# Patient Record
Sex: MALE | Race: WHITE | NOT HISPANIC OR LATINO | Employment: OTHER | URBAN - METROPOLITAN AREA
[De-identification: names, ages, dates, MRNs, and addresses within clinical notes are randomized per-mention and may not be internally consistent; named-entity substitution may affect disease eponyms.]

---

## 2017-01-24 ENCOUNTER — OFFICE VISIT (OUTPATIENT)
Dept: FAMILY MEDICINE CLINIC | Facility: CLINIC | Age: 62
End: 2017-01-24

## 2017-01-24 VITALS
RESPIRATION RATE: 16 BRPM | HEART RATE: 70 BPM | HEIGHT: 72 IN | SYSTOLIC BLOOD PRESSURE: 143 MMHG | BODY MASS INDEX: 33.72 KG/M2 | TEMPERATURE: 98.6 F | WEIGHT: 249 LBS | DIASTOLIC BLOOD PRESSURE: 73 MMHG

## 2017-01-24 DIAGNOSIS — I10 BENIGN ESSENTIAL HYPERTENSION: ICD-10-CM

## 2017-01-24 DIAGNOSIS — E78.00 HYPERCHOLESTEROLEMIA: ICD-10-CM

## 2017-01-24 DIAGNOSIS — Z79.4 TYPE 2 DIABETES MELLITUS WITH DIABETIC NEPHROPATHY, WITH LONG-TERM CURRENT USE OF INSULIN (HCC): Primary | ICD-10-CM

## 2017-01-24 DIAGNOSIS — E11.21 TYPE 2 DIABETES MELLITUS WITH DIABETIC NEPHROPATHY, WITHOUT LONG-TERM CURRENT USE OF INSULIN (HCC): ICD-10-CM

## 2017-01-24 DIAGNOSIS — E11.21 TYPE 2 DIABETES MELLITUS WITH DIABETIC NEPHROPATHY, WITH LONG-TERM CURRENT USE OF INSULIN (HCC): Primary | ICD-10-CM

## 2017-01-24 DIAGNOSIS — Z12.11 SCREENING FOR COLON CANCER: ICD-10-CM

## 2017-01-24 DIAGNOSIS — N18.30 CHRONIC KIDNEY DISEASE, STAGE III (MODERATE) (HCC): ICD-10-CM

## 2017-01-24 PROCEDURE — 99214 OFFICE O/P EST MOD 30 MIN: CPT | Performed by: FAMILY MEDICINE

## 2017-01-24 RX ORDER — LISINOPRIL 40 MG/1
40 TABLET ORAL DAILY
Qty: 90 TABLET | Refills: 1 | Status: SHIPPED | OUTPATIENT
Start: 2017-01-24 | End: 2017-08-22 | Stop reason: SDUPTHER

## 2017-01-24 RX ORDER — GLIMEPIRIDE 2 MG/1
TABLET ORAL
Qty: 90 TABLET | Refills: 1 | Status: SHIPPED | OUTPATIENT
Start: 2017-01-24 | End: 2017-08-22 | Stop reason: SDUPTHER

## 2017-01-24 RX ORDER — ATORVASTATIN CALCIUM 40 MG/1
40 TABLET, FILM COATED ORAL DAILY
Qty: 90 TABLET | Refills: 1 | Status: SHIPPED | OUTPATIENT
Start: 2017-01-24 | End: 2017-08-22 | Stop reason: SDUPTHER

## 2017-01-24 NOTE — PROGRESS NOTES
"Subjective   Junior Pollard Jr. is a 61 y.o. male.     History of Present Illness     Chief Complaint:   Chief Complaint   Patient presents with   • Diabetes   • Hyperlipidemia     DABETIC EYE EXAM DUE / COLONOSCOPY DUE    • Arthritis     PROSTATE EXAM THROUGH FIRE DEPARTMENT   • LAB RESULTS       Junior Pollard Jr. 61 y.o. male who presents today for Medical Management of the below listed issues and medication refills.  he has a history of   Patient Active Problem List   Diagnosis   • Abnormal cardiovascular stress test   • Multiple actinic keratoses   • Chronic coronary artery disease   • Benign essential hypertension   • Chronic kidney disease, stage III (moderate)   • Diabetes mellitus with renal complications   • Hypercholesterolemia   • Elevated creatine kinase level   • Onychomycosis of toenail   • Osteoarthritis   • Proteinuria   • Type 2 diabetes with nephropathy   .  Since the last visit, he has overall felt well.  he has been compliant with   Current Outpatient Prescriptions:   •  atorvastatin (LIPITOR) 40 MG tablet, Take 1 tablet by mouth Daily., Disp: 90 tablet, Rfl: 1  •  glimepiride (AMARYL) 2 MG tablet, Take 1/2 tab BID, Disp: 90 tablet, Rfl: 1  •  glucose blood (ACCU-CHEK COMPACT PLUS) test strip, 1 each by Other route Daily. Use as instructed, Disp: 100 each, Rfl: 3  •  Liraglutide (VICTOZA) 18 MG/3ML solution pen-injector, Inject 1.8 mg under the skin Daily., Disp: 9 pen, Rfl: 1  •  lisinopril (PRINIVIL,ZESTRIL) 40 MG tablet, Take 1 tablet by mouth Daily., Disp: 90 tablet, Rfl: 1.  he denies medication side effects.    Pt is not using the Insulin, just the Victoza, and is improving.    All of the chronic condition(s) listed above are stable w/o issues.    Visit Vitals   • /73   • Pulse 70   • Temp 98.6 °F (37 °C) (Oral)   • Resp 16   • Ht 72\" (182.9 cm)   • Wt 249 lb (113 kg)   • BMI 33.77 kg/m2       Results for orders placed or performed in visit on 10/19/16   Basic Metabolic Panel "   Result Value Ref Range    Glucose 169 (H) 65 - 99 mg/dL    BUN 19 8 - 23 mg/dL    Creatinine 1.41 (H) 0.76 - 1.27 mg/dL    eGFR Non African Am 51 (L) >60 mL/min/1.73    eGFR African Am 62 >60 mL/min/1.73    BUN/Creatinine Ratio 13.5 7.0 - 25.0    Sodium 142 136 - 145 mmol/L    Potassium 4.9 3.5 - 5.2 mmol/L    Chloride 102 98 - 107 mmol/L    Total CO2 25.5 22.0 - 29.0 mmol/L    Calcium 9.7 8.6 - 10.5 mg/dL   Hemoglobin A1c   Result Value Ref Range    Hemoglobin A1C 7.66 (H) 4.80 - 5.60 %         The following portions of the patient's history were reviewed and updated as appropriate: allergies, current medications, past family history, past medical history, past social history, past surgical history and problem list.    Review of Systems   Constitutional: Negative for activity change, chills, fatigue and fever.   Respiratory: Negative for cough and chest tightness.    Cardiovascular: Negative for chest pain and palpitations.   Gastrointestinal: Negative for abdominal pain and nausea.   Endocrine: Negative for cold intolerance and polydipsia.   Psychiatric/Behavioral: Negative for behavioral problems and dysphoric mood.   All other systems reviewed and are negative.      Objective   Physical Exam   Constitutional: He appears well-developed and well-nourished.   Neck: Neck supple. No thyromegaly present.   Cardiovascular: Normal rate and regular rhythm.    No murmur heard.  Pulmonary/Chest: Effort normal and breath sounds normal.   Abdominal: Bowel sounds are normal.   Psychiatric: He has a normal mood and affect. His behavior is normal.   Nursing note and vitals reviewed.    Labs reviewed with pt today during visit. All questions answered.    Assessment/Plan   Junior was seen today for diabetes, hyperlipidemia, arthritis and lab results.    Diagnoses and all orders for this visit:    Type 2 diabetes mellitus with diabetic nephropathy, with long-term current use of insulin  -     Ambulatory Referral to  Ophthalmology    Benign essential hypertension  -     lisinopril (PRINIVIL,ZESTRIL) 40 MG tablet; Take 1 tablet by mouth Daily.    Chronic kidney disease, stage III (moderate)    Hypercholesterolemia  -     atorvastatin (LIPITOR) 40 MG tablet; Take 1 tablet by mouth Daily.    Screening for colon cancer  -     Ambulatory Referral to Gastroenterology    Type 2 diabetes mellitus with diabetic nephropathy, without long-term current use of insulin  -     glimepiride (AMARYL) 2 MG tablet; Take 1/2 tab BID  -     Liraglutide (VICTOZA) 18 MG/3ML solution pen-injector; Inject 1.8 mg under the skin Daily.

## 2017-01-24 NOTE — MR AVS SNAPSHOT
Junior FERNANDO ToledoLatrice Jr.   1/24/2017 8:00 AM   Office Visit    Provider:  Boogie Interiano MD   Department:  [facility]   Dept Phone:  [phone]                Your Full Care Plan              Today's Medication Changes          These changes are accurate as of: 1/24/17  8:24 AM.  If you have any questions, ask your nurse or doctor.               Stop taking medication(s)listed here:     Insulin Degludec 100 UNIT/ML solution pen-injector   Stopped by:  Boogie Interiano MD                Where to Get Your Medications      These medications were sent to [address] [facility] - [phone]  - [phone] [facility] [facility], [place] [address]     Phone:  [phone]     atorvastatin 40 MG tablet    glimepiride 2 MG tablet    Liraglutide 18 MG/3ML solution pen-injector    lisinopril 40 MG tablet                  Your Updated Medication List          This list is accurate as of: 1/24/17  8:24 AM.  Always use your most recent med list.                atorvastatin 40 MG tablet   Commonly known as:  LIPITOR   Take 1 tablet by mouth Daily.       glimepiride 2 MG tablet   Commonly known as:  AMARYL   Take 1/2 tab BID       glucose blood test strip   Commonly known as:  ACCU-CHEK COMPACT PLUS   1 each by Other route Daily. Use as instructed       Liraglutide 18 MG/3ML solution pen-injector   Commonly known as:  VICTOZA   Inject 1.8 mg under the skin Daily.       lisinopril 40 MG tablet   Commonly known as:  PRINIVIL,ZESTRIL   Take 1 tablet by mouth Daily.               We Performed the Following     Ambulatory Referral to Gastroenterology     Ambulatory Referral to Ophthalmology       You Were Diagnosed With        Codes Comments    Type 2 diabetes mellitus with diabetic nephropathy, with long-term current use of insulin    -  Primary ICD-10-CM: E11.21, Z79.4  ICD-9-CM: 250.40, 583.81,  "V58.67     Benign essential hypertension     ICD-10-CM: I10  ICD-9-CM: 401.1     Chronic kidney disease, stage III (moderate)     ICD-10-CM: N18.3  ICD-9-CM: 585.3     Hypercholesterolemia     ICD-10-CM: E78.00  ICD-9-CM: 272.0     Screening for colon cancer     ICD-10-CM: Z12.11  ICD-9-CM: V76.51     Type 2 diabetes mellitus with diabetic nephropathy, without long-term current use of insulin     ICD-10-CM: E11.21  ICD-9-CM: 250.40, 583.81       Instructions     None    Patient Instructions History      [employer] Signup     Our records indicate that you have an active [employer] account.    You can view your After Visit Summary by going to [employer] and logging in with your [employer] username and password.  If you don't have a [employer] username and password but a parent or guardian has access to your record, the parent or guardian should login with their own [employer] username and password and access your record to view the After Visit Summary.    If you have questions, you can email [email or web address] or call [phone] to talk to our [employer] staff.  Remember, [employer] is NOT to be used for urgent needs.  For medical emergencies, dial 911.               Other Info from Your Visit           Allergies     No Known Allergies      Reason for Visit     Diabetes     Hyperlipidemia DABETIC EYE EXAM DUE / COLONOSCOPY DUE     Arthritis PROSTATE EXAM THROUGH FIRE DEPARTMENT    LAB RESULTS           Vital Signs     Blood Pressure Pulse Temperature Respirations Height Weight    143/73 70 98.6 °F (37 °C) (Oral) 16 72\" (182.9 cm) 249 lb (113 kg)    Body Mass Index Smoking Status                33.77 kg/m2 Never Smoker          Problems and Diagnoses Noted     Benign essential hypertension    Chronic kidney disease, stage III (moderate)    Diabetes with kidney complications    High cholesterol    Screen for colon cancer          "

## 2017-08-16 ENCOUNTER — TELEPHONE (OUTPATIENT)
Dept: FAMILY MEDICINE CLINIC | Facility: CLINIC | Age: 62
End: 2017-08-16

## 2017-08-16 DIAGNOSIS — E11.21 TYPE 2 DIABETES MELLITUS WITH DIABETIC NEPHROPATHY, WITH LONG-TERM CURRENT USE OF INSULIN (HCC): ICD-10-CM

## 2017-08-16 DIAGNOSIS — I10 BENIGN ESSENTIAL HYPERTENSION: Primary | ICD-10-CM

## 2017-08-16 DIAGNOSIS — E78.00 HYPERCHOLESTEROLEMIA: ICD-10-CM

## 2017-08-16 DIAGNOSIS — Z79.4 TYPE 2 DIABETES MELLITUS WITH DIABETIC NEPHROPATHY, WITH LONG-TERM CURRENT USE OF INSULIN (HCC): ICD-10-CM

## 2017-08-16 NOTE — TELEPHONE ENCOUNTER
----- Message from Junior Pollard Jr. sent at 8/15/2017  7:29 PM EDT -----  Regarding: Test Results Question  Contact: [phone]  A1C1 test befor vist on the 22 of Aug. can I get the blood work before the test, and do you all have to send any paper work over to [facility]

## 2017-08-18 LAB
ALBUMIN SERPL-MCNC: 4.4 G/DL (ref 3.5–5.2)
ALBUMIN/GLOB SERPL: 1.4 G/DL
ALP SERPL-CCNC: 77 U/L (ref 39–117)
ALT SERPL-CCNC: 15 U/L (ref 1–41)
AST SERPL-CCNC: 10 U/L (ref 1–40)
BASOPHILS # BLD AUTO: 0.08 10*3/MM3 (ref 0–0.2)
BASOPHILS NFR BLD AUTO: 1 % (ref 0–1.5)
BILIRUB SERPL-MCNC: 0.5 MG/DL (ref 0.1–1.2)
BUN SERPL-MCNC: 20 MG/DL (ref 8–23)
BUN/CREAT SERPL: 13.9 (ref 7–25)
CALCIUM SERPL-MCNC: 9.9 MG/DL (ref 8.6–10.5)
CHLORIDE SERPL-SCNC: 101 MMOL/L (ref 98–107)
CHOLEST SERPL-MCNC: 132 MG/DL (ref 0–200)
CO2 SERPL-SCNC: 25.8 MMOL/L (ref 22–29)
CREAT SERPL-MCNC: 1.44 MG/DL (ref 0.76–1.27)
EOSINOPHIL # BLD AUTO: 0.15 10*3/MM3 (ref 0–0.7)
EOSINOPHIL NFR BLD AUTO: 1.9 % (ref 0.3–6.2)
ERYTHROCYTE [DISTWIDTH] IN BLOOD BY AUTOMATED COUNT: 12.9 % (ref 11.5–14.5)
GLOBULIN SER CALC-MCNC: 3.1 GM/DL
GLUCOSE SERPL-MCNC: 179 MG/DL (ref 65–99)
HBA1C MFR BLD: 8.6 % (ref 4.8–5.6)
HCT VFR BLD AUTO: 42.4 % (ref 40.4–52.2)
HDLC SERPL-MCNC: 27 MG/DL (ref 40–60)
HGB BLD-MCNC: 13.8 G/DL (ref 13.7–17.6)
IMM GRANULOCYTES # BLD: 0.02 10*3/MM3 (ref 0–0.03)
IMM GRANULOCYTES NFR BLD: 0.2 % (ref 0–0.5)
LDLC SERPL CALC-MCNC: 77 MG/DL (ref 0–100)
LDLC/HDLC SERPL: 2.86 {RATIO}
LYMPHOCYTES # BLD AUTO: 2.53 10*3/MM3 (ref 0.9–4.8)
LYMPHOCYTES NFR BLD AUTO: 31.4 % (ref 19.6–45.3)
MCH RBC QN AUTO: 30.9 PG (ref 27–32.7)
MCHC RBC AUTO-ENTMCNC: 32.5 G/DL (ref 32.6–36.4)
MCV RBC AUTO: 94.9 FL (ref 79.8–96.2)
MICROALBUMIN UR-MCNC: 36.1 UG/ML
MONOCYTES # BLD AUTO: 0.47 10*3/MM3 (ref 0.2–1.2)
MONOCYTES NFR BLD AUTO: 5.8 % (ref 5–12)
NEUTROPHILS # BLD AUTO: 4.82 10*3/MM3 (ref 1.9–8.1)
NEUTROPHILS NFR BLD AUTO: 59.7 % (ref 42.7–76)
PLATELET # BLD AUTO: 280 10*3/MM3 (ref 140–500)
POTASSIUM SERPL-SCNC: 4.9 MMOL/L (ref 3.5–5.2)
PROT SERPL-MCNC: 7.5 G/DL (ref 6–8.5)
RBC # BLD AUTO: 4.47 10*6/MM3 (ref 4.6–6)
SODIUM SERPL-SCNC: 139 MMOL/L (ref 136–145)
TRIGL SERPL-MCNC: 139 MG/DL (ref 0–150)
TSH SERPL DL<=0.005 MIU/L-ACNC: 1.7 MIU/ML (ref 0.27–4.2)
VLDLC SERPL CALC-MCNC: 27.8 MG/DL (ref 5–40)
WBC # BLD AUTO: 8.07 10*3/MM3 (ref 4.5–10.7)

## 2017-08-22 ENCOUNTER — OFFICE VISIT (OUTPATIENT)
Dept: FAMILY MEDICINE CLINIC | Facility: CLINIC | Age: 62
End: 2017-08-22

## 2017-08-22 VITALS
DIASTOLIC BLOOD PRESSURE: 72 MMHG | RESPIRATION RATE: 16 BRPM | HEIGHT: 72 IN | SYSTOLIC BLOOD PRESSURE: 138 MMHG | TEMPERATURE: 98.9 F | BODY MASS INDEX: 33.86 KG/M2 | WEIGHT: 250 LBS | HEART RATE: 80 BPM

## 2017-08-22 DIAGNOSIS — E11.21 TYPE 2 DIABETES MELLITUS WITH DIABETIC NEPHROPATHY, WITHOUT LONG-TERM CURRENT USE OF INSULIN (HCC): ICD-10-CM

## 2017-08-22 DIAGNOSIS — I10 BENIGN ESSENTIAL HYPERTENSION: ICD-10-CM

## 2017-08-22 DIAGNOSIS — E78.00 HYPERCHOLESTEROLEMIA: ICD-10-CM

## 2017-08-22 DIAGNOSIS — Z12.5 SCREENING FOR PROSTATE CANCER: Primary | ICD-10-CM

## 2017-08-22 PROCEDURE — 99214 OFFICE O/P EST MOD 30 MIN: CPT | Performed by: FAMILY MEDICINE

## 2017-08-22 RX ORDER — LISINOPRIL 40 MG/1
40 TABLET ORAL DAILY
Qty: 90 TABLET | Refills: 1 | Status: SHIPPED | OUTPATIENT
Start: 2017-08-22 | End: 2018-02-01 | Stop reason: SDUPTHER

## 2017-08-22 RX ORDER — ATORVASTATIN CALCIUM 40 MG/1
40 TABLET, FILM COATED ORAL DAILY
Qty: 90 TABLET | Refills: 1 | Status: SHIPPED | OUTPATIENT
Start: 2017-08-22 | End: 2018-02-01 | Stop reason: SDUPTHER

## 2017-08-22 RX ORDER — GLIMEPIRIDE 2 MG/1
2 TABLET ORAL 2 TIMES DAILY
Qty: 180 TABLET | Refills: 1 | Status: SHIPPED | OUTPATIENT
Start: 2017-08-22 | End: 2018-02-01 | Stop reason: SDUPTHER

## 2017-08-22 NOTE — PROGRESS NOTES
"Subjective   Junior Pollard Jr. is a 61 y.o. male.     History of Present Illness     Chief Complaint:   Chief Complaint   Patient presents with   • Diabetes     MED REFILL    • Hyperlipidemia   • LAB RESULTS   • Hypertension       Junior Pollard Jr. 61 y.o. male who presents today for Medical Management of the below listed issues and medication refills.  he has a problem list of   Patient Active Problem List   Diagnosis   • Abnormal cardiovascular stress test   • Multiple actinic keratoses   • Chronic coronary artery disease   • Benign essential hypertension   • Chronic kidney disease, stage III (moderate)   • Diabetes mellitus with renal complications   • Hypercholesterolemia   • Elevated creatine kinase level   • Onychomycosis of toenail   • Osteoarthritis   • Proteinuria   • Type 2 diabetes with nephropathy   .  Since the last visit, he has overall felt well.  he has been compliant with   Current Outpatient Prescriptions:   •  atorvastatin (LIPITOR) 40 MG tablet, Take 1 tablet by mouth Daily., Disp: 90 tablet, Rfl: 1  •  glimepiride (AMARYL) 2 MG tablet, Take 1 tablet by mouth 2 (Two) Times a Day., Disp: 180 tablet, Rfl: 1  •  glucose blood (ACCU-CHEK COMPACT PLUS) test strip, 1 each by Other route Daily. Use as instructed, Disp: 100 each, Rfl: 3  •  Liraglutide (VICTOZA) 18 MG/3ML solution pen-injector, Inject 1.8 mg under the skin Daily., Disp: 9 pen, Rfl: 1  •  lisinopril (PRINIVIL,ZESTRIL) 40 MG tablet, Take 1 tablet by mouth Daily., Disp: 90 tablet, Rfl: 1.  he denies medication side effects.    All of the chronic condition(s) listed above are stable w/o issues.    /72  Pulse 80  Temp 98.9 °F (37.2 °C) (Oral)   Resp 16  Ht 72\" (182.9 cm)  Wt 250 lb (113 kg)  BMI 33.91 kg/m2    Results for orders placed or performed in visit on 08/16/17   Lipid Panel With LDL/HDL Ratio   Result Value Ref Range    Total Cholesterol 132 0 - 200 mg/dL    Triglycerides 139 0 - 150 mg/dL    HDL Cholesterol 27 " (L) 40 - 60 mg/dL    VLDL Cholesterol 27.8 5 - 40 mg/dL    LDL Cholesterol  77 0 - 100 mg/dL    LDL/HDL Ratio 2.86    TSH   Result Value Ref Range    TSH 1.700 0.270 - 4.200 mIU/mL   Hemoglobin A1c   Result Value Ref Range    Hemoglobin A1C 8.60 (H) 4.80 - 5.60 %   MicroAlbumin, Urine, Random   Result Value Ref Range    Microalbumin, Urine 36.1 Not Estab. ug/mL   Comprehensive metabolic panel   Result Value Ref Range    Glucose 179 (H) 65 - 99 mg/dL    BUN 20 8 - 23 mg/dL    Creatinine 1.44 (H) 0.76 - 1.27 mg/dL    eGFR Non African Am 50 (L) >60 mL/min/1.73    eGFR African Am 60 (L) >60 mL/min/1.73    BUN/Creatinine Ratio 13.9 7.0 - 25.0    Sodium 139 136 - 145 mmol/L    Potassium 4.9 3.5 - 5.2 mmol/L    Chloride 101 98 - 107 mmol/L    Total CO2 25.8 22.0 - 29.0 mmol/L    Calcium 9.9 8.6 - 10.5 mg/dL    Total Protein 7.5 6.0 - 8.5 g/dL    Albumin 4.40 3.50 - 5.20 g/dL    Globulin 3.1 gm/dL    A/G Ratio 1.4 g/dL    Total Bilirubin 0.5 0.1 - 1.2 mg/dL    Alkaline Phosphatase 77 39 - 117 U/L    AST (SGOT) 10 1 - 40 U/L    ALT (SGPT) 15 1 - 41 U/L   CBC and Differential   Result Value Ref Range    WBC 8.07 4.50 - 10.70 10*3/mm3    RBC 4.47 (L) 4.60 - 6.00 10*6/mm3    Hemoglobin 13.8 13.7 - 17.6 g/dL    Hematocrit 42.4 40.4 - 52.2 %    MCV 94.9 79.8 - 96.2 fL    MCH 30.9 27.0 - 32.7 pg    MCHC 32.5 (L) 32.6 - 36.4 g/dL    RDW 12.9 11.5 - 14.5 %    Platelets 280 140 - 500 10*3/mm3    Neutrophil Rel % 59.7 42.7 - 76.0 %    Lymphocyte Rel % 31.4 19.6 - 45.3 %    Monocyte Rel % 5.8 5.0 - 12.0 %    Eosinophil Rel % 1.9 0.3 - 6.2 %    Basophil Rel % 1.0 0.0 - 1.5 %    Neutrophils Absolute 4.82 1.90 - 8.10 10*3/mm3    Lymphocytes Absolute 2.53 0.90 - 4.80 10*3/mm3    Monocytes Absolute 0.47 0.20 - 1.20 10*3/mm3    Eosinophils Absolute 0.15 0.00 - 0.70 10*3/mm3    Basophils Absolute 0.08 0.00 - 0.20 10*3/mm3    Immature Granulocyte Rel % 0.2 0.0 - 0.5 %    Immature Grans Absolute 0.02 0.00 - 0.03 10*3/mm3         The following  portions of the patient's history were reviewed and updated as appropriate: allergies, current medications, past family history, past medical history, past social history, past surgical history and problem list.    Review of Systems   Constitutional: Negative for activity change, chills, fatigue and fever.   Respiratory: Negative for cough and shortness of breath.    Cardiovascular: Negative for chest pain and palpitations.   Gastrointestinal: Negative for abdominal pain.   Endocrine: Negative for cold intolerance.   Psychiatric/Behavioral: Negative for behavioral problems and dysphoric mood. The patient is not nervous/anxious.        Objective   Physical Exam   Constitutional: He appears well-developed and well-nourished.   Neck: Neck supple. No thyromegaly present.   Cardiovascular: Normal rate and regular rhythm.    No murmur heard.  Pulmonary/Chest: Effort normal and breath sounds normal.   Abdominal: Bowel sounds are normal.    Junior had a diabetic foot exam performed today.   During the foot exam he had a monofilament test performed.    Neurological Sensory Findings - Unaltered hot/cold right ankle/foot discrimination and unaltered hot/cold left ankle/foot discrimination. Unaltered sharp/dull right ankle/foot discrimination and unaltered sharp/dull left ankle/foot discrimination. No right ankle/foot altered proprioception and no left ankle/foot altered proprioception    Vascular Status -  His exam exhibits right foot vasculature normal. His exam exhibits no right foot edema. His exam exhibits left foot vasculature normal. His exam exhibits no left foot edema.   Skin Integrity  -  His right foot skin is intact.     Junior 's left foot skin is intact. .  Psychiatric: He has a normal mood and affect. His behavior is normal.   Nursing note and vitals reviewed.  Labs reviewed with pt today during visit. All questions answered.      Assessment/Plan   Junior was seen today for diabetes, hyperlipidemia, lab results  and hypertension.    Diagnoses and all orders for this visit:    Screening for prostate cancer  -     PSA; Future    Type 2 diabetes mellitus with diabetic nephropathy, without long-term current use of insulin  Comments:  uncontrolled  Orders:  -     Liraglutide (VICTOZA) 18 MG/3ML solution pen-injector; Inject 1.8 mg under the skin Daily.  -     glimepiride (AMARYL) 2 MG tablet; Take 1 tablet by mouth 2 (Two) Times a Day.  -     glucose blood (ACCU-CHEK COMPACT PLUS) test strip; 1 each by Other route Daily. Use as instructed  -     Hemoglobin A1c; Future  -     Basic Metabolic Panel; Future    Benign essential hypertension  -     lisinopril (PRINIVIL,ZESTRIL) 40 MG tablet; Take 1 tablet by mouth Daily.  -     Basic Metabolic Panel; Future    Hypercholesterolemia  -     atorvastatin (LIPITOR) 40 MG tablet; Take 1 tablet by mouth Daily.    Pt is going to see his eye MD shortly.

## 2018-01-22 ENCOUNTER — RESULTS ENCOUNTER (OUTPATIENT)
Dept: FAMILY MEDICINE CLINIC | Facility: CLINIC | Age: 63
End: 2018-01-22

## 2018-01-22 DIAGNOSIS — I10 BENIGN ESSENTIAL HYPERTENSION: ICD-10-CM

## 2018-01-22 DIAGNOSIS — E11.21 TYPE 2 DIABETES MELLITUS WITH DIABETIC NEPHROPATHY, WITHOUT LONG-TERM CURRENT USE OF INSULIN (HCC): ICD-10-CM

## 2018-01-22 DIAGNOSIS — Z12.5 SCREENING FOR PROSTATE CANCER: ICD-10-CM

## 2018-01-22 LAB
BUN SERPL-MCNC: 20 MG/DL (ref 8–23)
BUN/CREAT SERPL: 14.5 (ref 7–25)
CALCIUM SERPL-MCNC: 9.5 MG/DL (ref 8.6–10.5)
CHLORIDE SERPL-SCNC: 103 MMOL/L (ref 98–107)
CO2 SERPL-SCNC: 24.2 MMOL/L (ref 22–29)
CREAT SERPL-MCNC: 1.38 MG/DL (ref 0.76–1.27)
GLUCOSE SERPL-MCNC: 237 MG/DL (ref 65–99)
HBA1C MFR BLD: 8.64 % (ref 4.8–5.6)
POTASSIUM SERPL-SCNC: 5 MMOL/L (ref 3.5–5.2)
PSA SERPL-MCNC: 0.51 NG/ML (ref 0–4)
SODIUM SERPL-SCNC: 140 MMOL/L (ref 136–145)

## 2018-02-01 ENCOUNTER — OFFICE VISIT (OUTPATIENT)
Dept: FAMILY MEDICINE CLINIC | Facility: CLINIC | Age: 63
End: 2018-02-01

## 2018-02-01 VITALS
SYSTOLIC BLOOD PRESSURE: 128 MMHG | HEIGHT: 72 IN | BODY MASS INDEX: 33.59 KG/M2 | HEART RATE: 78 BPM | WEIGHT: 248 LBS | RESPIRATION RATE: 20 BRPM | DIASTOLIC BLOOD PRESSURE: 72 MMHG | TEMPERATURE: 98 F

## 2018-02-01 DIAGNOSIS — E78.00 HYPERCHOLESTEROLEMIA: ICD-10-CM

## 2018-02-01 DIAGNOSIS — E11.21 TYPE 2 DIABETES WITH NEPHROPATHY (HCC): Primary | ICD-10-CM

## 2018-02-01 DIAGNOSIS — I10 BENIGN ESSENTIAL HYPERTENSION: ICD-10-CM

## 2018-02-01 PROCEDURE — 99214 OFFICE O/P EST MOD 30 MIN: CPT | Performed by: FAMILY MEDICINE

## 2018-02-01 RX ORDER — PIOGLITAZONEHYDROCHLORIDE 30 MG/1
30 TABLET ORAL DAILY
Qty: 90 TABLET | Refills: 1 | Status: SHIPPED | OUTPATIENT
Start: 2018-02-01 | End: 2018-07-16 | Stop reason: SDUPTHER

## 2018-02-01 RX ORDER — GLIMEPIRIDE 2 MG/1
2 TABLET ORAL
Qty: 180 TABLET | Refills: 1 | Status: SHIPPED | OUTPATIENT
Start: 2018-02-01 | End: 2018-09-13 | Stop reason: SDUPTHER

## 2018-02-01 RX ORDER — ATORVASTATIN CALCIUM 40 MG/1
40 TABLET, FILM COATED ORAL DAILY
Qty: 90 TABLET | Refills: 1 | Status: SHIPPED | OUTPATIENT
Start: 2018-02-01 | End: 2018-09-13 | Stop reason: SDUPTHER

## 2018-02-01 RX ORDER — LISINOPRIL 40 MG/1
40 TABLET ORAL DAILY
Qty: 90 TABLET | Refills: 1 | Status: SHIPPED | OUTPATIENT
Start: 2018-02-01 | End: 2018-09-13 | Stop reason: SDUPTHER

## 2018-02-01 NOTE — PROGRESS NOTES
"Subjective   Junior Pollard Jr. is a 62 y.o. male.     History of Present Illness     Chief Complaint:   Chief Complaint   Patient presents with   • Diabetes   • Hypertension   • Hyperlipidemia       Junior Pollard Jr. 62 y.o. male who presents today for Medical Management of the below listed issues and medication refills.  he has a problem list of   Patient Active Problem List   Diagnosis   • Abnormal cardiovascular stress test   • Multiple actinic keratoses   • Chronic coronary artery disease   • Benign essential hypertension   • Chronic kidney disease, stage III (moderate)   • Diabetes mellitus with renal complications   • Hypercholesterolemia   • Elevated creatine kinase level   • Onychomycosis of toenail   • Osteoarthritis   • Proteinuria   • Type 2 diabetes with nephropathy   .  Since the last visit, he has overall felt well.  he has been compliant with   Current Outpatient Prescriptions:   •  atorvastatin (LIPITOR) 40 MG tablet, Take 1 tablet by mouth Daily., Disp: 90 tablet, Rfl: 1  •  glimepiride (AMARYL) 2 MG tablet, Take 1 tablet by mouth 2 (Two) Times a Day., Disp: 180 tablet, Rfl: 1  •  glucose blood (ACCU-CHEK COMPACT PLUS) test strip, 1 each by Other route Daily. Use as instructed, Disp: 100 each, Rfl: 3  •  Liraglutide (VICTOZA) 18 MG/3ML solution pen-injector injection, Inject 1.8 mg under the skin Daily., Disp: 9 pen, Rfl: 1  •  lisinopril (PRINIVIL,ZESTRIL) 40 MG tablet, Take 1 tablet by mouth Daily., Disp: 90 tablet, Rfl: 1  •  pioglitazone (ACTOS) 30 MG tablet, Take 1 tablet by mouth Daily., Disp: 90 tablet, Rfl: 1.  he denies medication side effects.    All of the chronic condition(s) listed above are stable w/o issues.    /72  Pulse 78  Temp 98 °F (36.7 °C)  Resp 20  Ht 182.9 cm (72.01\")  Wt 112 kg (248 lb)  BMI 33.63 kg/m2    Results for orders placed or performed in visit on 01/22/18   Hemoglobin A1c   Result Value Ref Range    Hemoglobin A1C 8.64 (H) 4.80 - 5.60 %   Basic " Metabolic Panel   Result Value Ref Range    Glucose 237 (H) 65 - 99 mg/dL    BUN 20 8 - 23 mg/dL    Creatinine 1.38 (H) 0.76 - 1.27 mg/dL    eGFR Non African Am 52 (L) >60 mL/min/1.73    eGFR African Am 63 >60 mL/min/1.73    BUN/Creatinine Ratio 14.5 7.0 - 25.0    Sodium 140 136 - 145 mmol/L    Potassium 5.0 3.5 - 5.2 mmol/L    Chloride 103 98 - 107 mmol/L    Total CO2 24.2 22.0 - 29.0 mmol/L    Calcium 9.5 8.6 - 10.5 mg/dL   PSA   Result Value Ref Range    PSA 0.508 0.000 - 4.000 ng/mL           The following portions of the patient's history were reviewed and updated as appropriate: allergies, current medications, past family history, past medical history, past social history, past surgical history and problem list.    Review of Systems   Constitutional: Negative for activity change, chills, fatigue and fever.   Respiratory: Negative for cough and shortness of breath.    Cardiovascular: Negative for chest pain and palpitations.   Gastrointestinal: Negative for abdominal pain.   Endocrine: Negative for cold intolerance, polydipsia, polyphagia and polyuria.   Skin: Negative for pallor.   Neurological: Negative for dizziness, tremors, seizures, speech difficulty, weakness and headaches.   Psychiatric/Behavioral: Negative for behavioral problems, confusion and dysphoric mood. The patient is not nervous/anxious.        Objective   Physical Exam   Constitutional: He appears well-developed and well-nourished.   Neck: Neck supple. No thyromegaly present.   Cardiovascular: Normal rate and regular rhythm.    No murmur heard.  Pulmonary/Chest: Effort normal and breath sounds normal.   Abdominal: Bowel sounds are normal.   Psychiatric: He has a normal mood and affect. His behavior is normal.   Nursing note and vitals reviewed.      Assessment/Plan   Junior was seen today for diabetes, hypertension and hyperlipidemia.    Diagnoses and all orders for this visit:    Type 2 diabetes with  nephropathy  Comments:  uncontrolled  Orders:  -     glimepiride (AMARYL) 2 MG tablet; Take 1 tablet by mouth 2 (Two) Times a Day.  -     Liraglutide (VICTOZA) 18 MG/3ML solution pen-injector injection; Inject 1.8 mg under the skin Daily.  -     pioglitazone (ACTOS) 30 MG tablet; Take 1 tablet by mouth Daily.  -     Hemoglobin A1c; Future  -     Comprehensive Metabolic Panel; Future  -     Lipid Panel; Future    Benign essential hypertension  -     lisinopril (PRINIVIL,ZESTRIL) 40 MG tablet; Take 1 tablet by mouth Daily.  -     Comprehensive Metabolic Panel; Future  -     CBC & Differential; Future  -     Lipid Panel; Future    Hypercholesterolemia  -     atorvastatin (LIPITOR) 40 MG tablet; Take 1 tablet by mouth Daily.  -     Lipid Panel; Future

## 2018-05-16 ENCOUNTER — CLINICAL SUPPORT (OUTPATIENT)
Dept: FAMILY MEDICINE CLINIC | Facility: CLINIC | Age: 63
End: 2018-05-16

## 2018-05-16 DIAGNOSIS — Z23 IMMUNIZATION DUE: Primary | ICD-10-CM

## 2018-05-16 PROCEDURE — 90632 HEPA VACCINE ADULT IM: CPT | Performed by: FAMILY MEDICINE

## 2018-05-16 PROCEDURE — 90471 IMMUNIZATION ADMIN: CPT | Performed by: FAMILY MEDICINE

## 2018-07-01 ENCOUNTER — RESULTS ENCOUNTER (OUTPATIENT)
Dept: FAMILY MEDICINE CLINIC | Facility: CLINIC | Age: 63
End: 2018-07-01

## 2018-07-01 DIAGNOSIS — E78.00 HYPERCHOLESTEROLEMIA: ICD-10-CM

## 2018-07-01 DIAGNOSIS — E11.21 TYPE 2 DIABETES WITH NEPHROPATHY (HCC): ICD-10-CM

## 2018-07-01 DIAGNOSIS — I10 BENIGN ESSENTIAL HYPERTENSION: ICD-10-CM

## 2018-07-16 DIAGNOSIS — E11.21 TYPE 2 DIABETES WITH NEPHROPATHY (HCC): ICD-10-CM

## 2018-07-16 RX ORDER — PIOGLITAZONEHYDROCHLORIDE 30 MG/1
TABLET ORAL
Qty: 30 TABLET | Refills: 0 | Status: SHIPPED | OUTPATIENT
Start: 2018-07-16 | End: 2018-09-13

## 2018-07-17 DIAGNOSIS — E11.21 TYPE 2 DIABETES WITH NEPHROPATHY (HCC): ICD-10-CM

## 2018-07-17 RX ORDER — PIOGLITAZONEHYDROCHLORIDE 30 MG/1
TABLET ORAL
Qty: 90 TABLET | Refills: 0 | Status: SHIPPED | OUTPATIENT
Start: 2018-07-17 | End: 2018-09-13 | Stop reason: SDUPTHER

## 2018-09-10 LAB
ALBUMIN SERPL-MCNC: 4.9 G/DL (ref 3.5–5.2)
ALBUMIN/GLOB SERPL: 1.8 G/DL
ALP SERPL-CCNC: 59 U/L (ref 39–117)
ALT SERPL-CCNC: 18 U/L (ref 1–41)
AST SERPL-CCNC: 17 U/L (ref 1–40)
BASOPHILS # BLD AUTO: 0.05 10*3/MM3 (ref 0–0.2)
BASOPHILS NFR BLD AUTO: 0.7 % (ref 0–1.5)
BILIRUB SERPL-MCNC: 0.6 MG/DL (ref 0.1–1.2)
BUN SERPL-MCNC: 17 MG/DL (ref 8–23)
BUN/CREAT SERPL: 11.6 (ref 7–25)
CALCIUM SERPL-MCNC: 10 MG/DL (ref 8.6–10.5)
CHLORIDE SERPL-SCNC: 103 MMOL/L (ref 98–107)
CHOLEST SERPL-MCNC: 158 MG/DL (ref 0–200)
CO2 SERPL-SCNC: 27.5 MMOL/L (ref 22–29)
CREAT SERPL-MCNC: 1.47 MG/DL (ref 0.76–1.27)
EOSINOPHIL # BLD AUTO: 0.11 10*3/MM3 (ref 0–0.7)
EOSINOPHIL NFR BLD AUTO: 1.6 % (ref 0.3–6.2)
ERYTHROCYTE [DISTWIDTH] IN BLOOD BY AUTOMATED COUNT: 13.2 % (ref 11.5–14.5)
GLOBULIN SER CALC-MCNC: 2.7 GM/DL
GLUCOSE SERPL-MCNC: 108 MG/DL (ref 65–99)
HBA1C MFR BLD: 7.2 % (ref 4.8–5.6)
HCT VFR BLD AUTO: 39.7 % (ref 40.4–52.2)
HDLC SERPL-MCNC: 30 MG/DL (ref 40–60)
HGB BLD-MCNC: 12.8 G/DL (ref 13.7–17.6)
IMM GRANULOCYTES # BLD: 0.02 10*3/MM3 (ref 0–0.03)
IMM GRANULOCYTES NFR BLD: 0.3 % (ref 0–0.5)
LDLC SERPL CALC-MCNC: 107 MG/DL (ref 0–100)
LYMPHOCYTES # BLD AUTO: 2.31 10*3/MM3 (ref 0.9–4.8)
LYMPHOCYTES NFR BLD AUTO: 33.5 % (ref 19.6–45.3)
MCH RBC QN AUTO: 29.8 PG (ref 27–32.7)
MCHC RBC AUTO-ENTMCNC: 32.2 G/DL (ref 32.6–36.4)
MCV RBC AUTO: 92.5 FL (ref 79.8–96.2)
MONOCYTES # BLD AUTO: 0.41 10*3/MM3 (ref 0.2–1.2)
MONOCYTES NFR BLD AUTO: 5.9 % (ref 5–12)
NEUTROPHILS # BLD AUTO: 4.02 10*3/MM3 (ref 1.9–8.1)
NEUTROPHILS NFR BLD AUTO: 58.3 % (ref 42.7–76)
PLATELET # BLD AUTO: 274 10*3/MM3 (ref 140–500)
POTASSIUM SERPL-SCNC: 4.3 MMOL/L (ref 3.5–5.2)
PROT SERPL-MCNC: 7.6 G/DL (ref 6–8.5)
RBC # BLD AUTO: 4.29 10*6/MM3 (ref 4.6–6)
SODIUM SERPL-SCNC: 141 MMOL/L (ref 136–145)
TRIGL SERPL-MCNC: 107 MG/DL (ref 0–150)
VLDLC SERPL CALC-MCNC: 21.4 MG/DL (ref 5–40)
WBC # BLD AUTO: 6.9 10*3/MM3 (ref 4.5–10.7)

## 2018-09-13 ENCOUNTER — OFFICE VISIT (OUTPATIENT)
Dept: FAMILY MEDICINE CLINIC | Facility: CLINIC | Age: 63
End: 2018-09-13

## 2018-09-13 VITALS
HEIGHT: 72 IN | TEMPERATURE: 98.1 F | RESPIRATION RATE: 16 BRPM | WEIGHT: 273 LBS | DIASTOLIC BLOOD PRESSURE: 64 MMHG | HEART RATE: 71 BPM | SYSTOLIC BLOOD PRESSURE: 128 MMHG | BODY MASS INDEX: 36.98 KG/M2

## 2018-09-13 DIAGNOSIS — I10 BENIGN ESSENTIAL HYPERTENSION: ICD-10-CM

## 2018-09-13 DIAGNOSIS — Z12.11 SCREENING FOR COLON CANCER: ICD-10-CM

## 2018-09-13 DIAGNOSIS — D64.9 ANEMIA, UNSPECIFIED TYPE: ICD-10-CM

## 2018-09-13 DIAGNOSIS — E11.21 TYPE 2 DIABETES WITH NEPHROPATHY (HCC): Primary | ICD-10-CM

## 2018-09-13 DIAGNOSIS — E78.00 HYPERCHOLESTEROLEMIA: ICD-10-CM

## 2018-09-13 PROCEDURE — 99214 OFFICE O/P EST MOD 30 MIN: CPT | Performed by: FAMILY MEDICINE

## 2018-09-13 RX ORDER — LISINOPRIL 40 MG/1
40 TABLET ORAL DAILY
Qty: 90 TABLET | Refills: 1 | Status: SHIPPED | OUTPATIENT
Start: 2018-09-13 | End: 2019-04-11 | Stop reason: SDUPTHER

## 2018-09-13 RX ORDER — GLIMEPIRIDE 2 MG/1
2 TABLET ORAL
Qty: 180 TABLET | Refills: 1 | Status: SHIPPED | OUTPATIENT
Start: 2018-09-13 | End: 2019-04-11 | Stop reason: SDUPTHER

## 2018-09-13 RX ORDER — PIOGLITAZONEHYDROCHLORIDE 30 MG/1
30 TABLET ORAL DAILY
Qty: 90 TABLET | Refills: 1 | Status: SHIPPED | OUTPATIENT
Start: 2018-09-13 | End: 2019-04-11 | Stop reason: SDUPTHER

## 2018-09-13 RX ORDER — ATORVASTATIN CALCIUM 40 MG/1
40 TABLET, FILM COATED ORAL DAILY
Qty: 90 TABLET | Refills: 1 | Status: SHIPPED | OUTPATIENT
Start: 2018-09-13 | End: 2019-04-11 | Stop reason: SDUPTHER

## 2018-09-13 NOTE — PROGRESS NOTES
"Subjective   Junior Pollard Jr. is a 62 y.o. male.     History of Present Illness     Chief Complaint:   Chief Complaint   Patient presents with   • Hypertension   • Hyperlipidemia   • Diabetes       Junior Pollard Jr. 62 y.o. male who presents today for Medical Management of the below listed issues and medication refills.  he has a problem list of   Patient Active Problem List   Diagnosis   • Abnormal cardiovascular stress test   • Multiple actinic keratoses   • Chronic coronary artery disease   • Benign essential hypertension   • Chronic kidney disease, stage III (moderate)   • Diabetes mellitus with renal complications (CMS/HCC)   • Hypercholesterolemia   • Elevated creatine kinase level   • Onychomycosis of toenail   • Osteoarthritis   • Proteinuria   • Type 2 diabetes with nephropathy (CMS/[facility])   .  Since the last visit, he has overall felt well.  he has been compliant with   Current Outpatient Prescriptions:   •  atorvastatin (LIPITOR) 40 MG tablet, Take 1 tablet by mouth Daily., Disp: 90 tablet, Rfl: 1  •  glimepiride (AMARYL) 2 MG tablet, Take 1 tablet by mouth 2 (Two) Times a Day., Disp: 180 tablet, Rfl: 1  •  glucose blood (ACCU-CHEK COMPACT PLUS) test strip, 1 each by Other route Daily. Use as instructed, Disp: 100 each, Rfl: 3  •  Liraglutide (VICTOZA) 18 MG/3ML solution pen-injector injection, Inject 1.8 mg under the skin into the appropriate area as directed Daily., Disp: 9 pen, Rfl: 1  •  lisinopril (PRINIVIL,ZESTRIL) 40 MG tablet, Take 1 tablet by mouth Daily., Disp: 90 tablet, Rfl: 1  •  pioglitazone (ACTOS) 30 MG tablet, Take 1 tablet by mouth Daily., Disp: 90 tablet, Rfl: 1.  he denies medication side effects.    All of the chronic condition(s) listed above are stable w/o issues.    /64   Pulse 71   Temp 98.1 °F (36.7 °C) (Oral)   Resp 16   Ht 182.9 cm (72\")   Wt 124 kg (273 lb)   BMI 37.03 kg/m²     Results for orders placed or performed in visit on 07/01/18   Hemoglobin A1c "   Result Value Ref Range    Hemoglobin A1C 7.20 (H) 4.80 - 5.60 %   Comprehensive Metabolic Panel   Result Value Ref Range    Glucose 108 (H) 65 - 99 mg/dL    BUN 17 8 - 23 mg/dL    Creatinine 1.47 (H) 0.76 - 1.27 mg/dL    eGFR Non African Am 48 (L) >60 mL/min/1.73    eGFR African Am 58 (L) >60 mL/min/1.73    BUN/Creatinine Ratio 11.6 7.0 - 25.0    Sodium 141 136 - 145 mmol/L    Potassium 4.3 3.5 - 5.2 mmol/L    Chloride 103 98 - 107 mmol/L    Total CO2 27.5 22.0 - 29.0 mmol/L    Calcium 10.0 8.6 - 10.5 mg/dL    Total Protein 7.6 6.0 - 8.5 g/dL    Albumin 4.90 3.50 - 5.20 g/dL    Globulin 2.7 gm/dL    A/G Ratio 1.8 g/dL    Total Bilirubin 0.6 0.1 - 1.2 mg/dL    Alkaline Phosphatase 59 39 - 117 U/L    AST (SGOT) 17 1 - 40 U/L    ALT (SGPT) 18 1 - 41 U/L   Lipid Panel   Result Value Ref Range    Total Cholesterol 158 0 - 200 mg/dL    Triglycerides 107 0 - 150 mg/dL    HDL Cholesterol 30 (L) 40 - 60 mg/dL    VLDL Cholesterol 21.4 5 - 40 mg/dL    LDL Cholesterol  107 (H) 0 - 100 mg/dL   CBC & Differential   Result Value Ref Range    WBC 6.90 4.50 - 10.70 10*3/mm3    RBC 4.29 (L) 4.60 - 6.00 10*6/mm3    Hemoglobin 12.8 (L) 13.7 - 17.6 g/dL    Hematocrit 39.7 (L) 40.4 - 52.2 %    MCV 92.5 79.8 - 96.2 fL    MCH 29.8 27.0 - 32.7 pg    MCHC 32.2 (L) 32.6 - 36.4 g/dL    RDW 13.2 11.5 - 14.5 %    Platelets 274 140 - 500 10*3/mm3    Neutrophil Rel % 58.3 42.7 - 76.0 %    Lymphocyte Rel % 33.5 19.6 - 45.3 %    Monocyte Rel % 5.9 5.0 - 12.0 %    Eosinophil Rel % 1.6 0.3 - 6.2 %    Basophil Rel % 0.7 0.0 - 1.5 %    Neutrophils Absolute 4.02 1.90 - 8.10 10*3/mm3    Lymphocytes Absolute 2.31 0.90 - 4.80 10*3/mm3    Monocytes Absolute 0.41 0.20 - 1.20 10*3/mm3    Eosinophils Absolute 0.11 0.00 - 0.70 10*3/mm3    Basophils Absolute 0.05 0.00 - 0.20 10*3/mm3    Immature Granulocyte Rel % 0.3 0.0 - 0.5 %    Immature Grans Absolute 0.02 0.00 - 0.03 10*3/mm3           The following portions of the patient's history were reviewed and  updated as appropriate: allergies, current medications, past family history, past medical history, past social history, past surgical history and problem list.    Review of Systems   Constitutional: Negative for activity change, chills, fatigue and fever.   Respiratory: Negative for cough and shortness of breath.    Cardiovascular: Negative for chest pain and palpitations.   Gastrointestinal: Negative for abdominal pain.   Endocrine: Negative for cold intolerance.   Psychiatric/Behavioral: Negative for behavioral problems and dysphoric mood. The patient is not nervous/anxious.        Objective   Physical Exam   Constitutional: He appears well-developed and well-nourished.   Neck: Neck supple. No thyromegaly present.   Cardiovascular: Normal rate and regular rhythm.    No murmur heard.  Pulmonary/Chest: Effort normal and breath sounds normal.   Abdominal: Bowel sounds are normal. There is no tenderness.   Psychiatric: He has a normal mood and affect. His behavior is normal.   Nursing note and vitals reviewed.  Labs reviewed with pt today during visit. All questions answered.      Assessment/Plan   Junior was seen today for hypertension, hyperlipidemia and diabetes.    Diagnoses and all orders for this visit:    Type 2 diabetes with nephropathy (CMS/[facility])  Comments:  uncontrolled  Orders:  -     glimepiride (AMARYL) 2 MG tablet; Take 1 tablet by mouth 2 (Two) Times a Day.  -     glucose blood (ACCU-CHEK COMPACT PLUS) test strip; 1 each by Other route Daily. Use as instructed  -     Liraglutide (VICTOZA) 18 MG/3ML solution pen-injector injection; Inject 1.8 mg under the skin into the appropriate area as directed Daily.  -     pioglitazone (ACTOS) 30 MG tablet; Take 1 tablet by mouth Daily.    Hypercholesterolemia  -     atorvastatin (LIPITOR) 40 MG tablet; Take 1 tablet by mouth Daily.    Benign essential hypertension  -     lisinopril (PRINIVIL,ZESTRIL) 40 MG tablet; Take 1 tablet by mouth Daily.    Anemia, unspecified  type  -     Ambulatory Referral to Gastroenterology    Screening for colon cancer  -     Ambulatory Referral to Gastroenterology

## 2018-09-13 NOTE — PATIENT INSTRUCTIONS
Obesity, Adult  Obesity is the condition of having too much total body fat. Being overweight or obese means that your weight is greater than what is considered healthy for your body size. Obesity is determined by a measurement called BMI. BMI is an estimate of body fat and is calculated from height and weight. For adults, a BMI of 30 or higher is considered obese.  Obesity can eventually lead to other health concerns and major illnesses, including:  · Stroke.  · Coronary artery disease (CAD).  · Type 2 diabetes.  · Some types of cancer, including cancers of the colon, breast, uterus, and gallbladder.  · Osteoarthritis.  · High blood pressure (hypertension).  · High cholesterol.  · Sleep apnea.  · Gallbladder stones.  · Infertility problems.    What are the causes?  The main cause of obesity is taking in (consuming) more calories than your body uses for energy. Other factors that contribute to this condition may include:  · Being born with genes that make you more likely to become obese.  · Having a medical condition that causes obesity. These conditions include:  ? Hypothyroidism.  ? Polycystic ovarian syndrome (PCOS).  ? Binge-eating disorder.  ? Cushing syndrome.  · Taking certain medicines, such as steroids, antidepressants, and seizure medicines.  · Not being physically active (sedentary lifestyle).  · Living where there are limited places to exercise safely or buy healthy foods.  · Not getting enough sleep.    What increases the risk?  The following factors may increase your risk of this condition:  · Having a family history of obesity.  · Being a woman of -American descent.  · Being a man of  descent.    What are the signs or symptoms?  Having excessive body fat is the main symptom of this condition.  How is this diagnosed?  This condition may be diagnosed based on:  · Your symptoms.  · Your medical history.  · A physical exam. Your health care provider may measure:  ? Your BMI. If you are  an adult with a BMI between 25 and less than 30, you are considered overweight. If you are an adult with a BMI of 30 or higher, you are considered obese.  ? The distances around your hips and your waist (circumferences). These may be compared to each other to help diagnose your condition.  ? Your skinfold thickness. Your health care provider may gently pinch a fold of your skin and measure it.    How is this treated?  Treatment for this condition often includes changing your lifestyle. Treatment may include some or all of the following:  · Dietary changes. Work with your health care provider and a dietitian to set a weight-loss goal that is healthy and reasonable for you. Dietary changes may include eating:  ? Smaller portions. A portion size is the amount of a particular food that is healthy for you to eat at one time. This varies from person to person.  ? Low-calorie or low-fat options.  ? More whole grains, fruits, and vegetables.  · Regular physical activity. This may include aerobic activity (cardio) and strength training.  · Medicine to help you lose weight. Your health care provider may prescribe medicine if you are unable to lose 1 pound a week after 6 weeks of eating more healthily and doing more physical activity.  · Surgery. Surgical options may include gastric banding and gastric bypass. Surgery may be done if:  ? Other treatments have not helped to improve your condition.  ? You have a BMI of 40 or higher.  ? You have life-threatening health problems related to obesity.    Follow these instructions at home:    Eating and drinking    · Follow recommendations from your health care provider about what you eat and drink. Your health care provider may advise you to:  ? Limit fast foods, sweets, and processed snack foods.  ? Choose low-fat options, such as low-fat milk instead of whole milk.  ? Eat 5 or more servings of fruits or vegetables every day.  ? Eat at home more often. This gives you more control  over what you eat.  ? Choose healthy foods when you eat out.  ? Learn what a healthy portion size is.  ? Keep low-fat snacks on hand.  ? Avoid sugary drinks, such as soda, fruit juice, iced tea sweetened with sugar, and flavored milk.  ? Eat a healthy breakfast.  · Drink enough water to keep your urine clear or pale yellow.  · Do not go without eating for long periods of time (do not fast) or follow a fad diet. Fasting and fad diets can be unhealthy and even dangerous.  Physical Activity  · Exercise regularly, as told by your health care provider. Ask your health care provider what types of exercise are safe for you and how often you should exercise.  · Warm up and stretch before being active.  · Cool down and stretch after being active.  · Rest between periods of activity.  Lifestyle  · Limit the time that you spend in front of your TV, computer, or video game system.  · Find ways to reward yourself that do not involve food.  · Limit alcohol intake to no more than 1 drink a day for nonpregnant women and 2 drinks a day for men. One drink equals 12 oz of beer, 5 oz of wine, or 1½ oz of hard liquor.  General instructions  · Keep a weight loss journal to keep track of the food you eat and how much you exercise you get.  · Take over-the-counter and prescription medicines only as told by your health care provider.  · Take vitamins and supplements only as told by your health care provider.  · Consider joining a support group. Your health care provider may be able to recommend a support group.  · Keep all follow-up visits as told by your health care provider. This is important.  Contact a health care provider if:  · You are unable to meet your weight loss goal after 6 weeks of dietary and lifestyle changes.  This information is not intended to replace advice given to you by your health care provider. Make sure you discuss any questions you have with your health care provider.  Document Released: 01/25/2006 Document  Revised: 05/22/2017 Document Reviewed: 10/05/2016  [employer] Patient Education © 2018 [employer]      Exercising to Lose Weight  Exercising can help you to lose weight. In order to lose weight through exercise, you need to do vigorous-intensity exercise. You can tell that you are exercising with vigorous intensity if you are breathing very hard and fast and cannot hold a conversation while exercising.  Moderate-intensity exercise helps to maintain your current weight. You can tell that you are exercising at a moderate level if you have a higher heart rate and faster breathing, but you are still able to hold a conversation.  How often should I exercise?  Choose an activity that you enjoy and set realistic goals. Your health care provider can help you to make an activity plan that works for you. Exercise regularly as directed by your health care provider. This may include:  · Doing resistance training twice each week, such as:  ? Push-ups.  ? Sit-ups.  ? Lifting weights.  ? Using resistance bands.  · Doing a given intensity of exercise for a given amount of time. Choose from these options:  ? 150 minutes of moderate-intensity exercise every week.  ? 75 minutes of vigorous-intensity exercise every week.  ? A mix of moderate-intensity and vigorous-intensity exercise every week.    Children, pregnant women, people who are out of shape, people who are overweight, and older adults may need to consult a health care provider for individual recommendations. If you have any sort of medical condition, be sure to consult your health care provider before starting a new exercise program.  What are some activities that can help me to lose weight?  · Walking at a rate of at least 4.5 miles an hour.  · Jogging or running at a rate of 5 miles per hour.  · Biking at a rate of at least 10 miles per hour.  · Lap swimming.  · Roller-skating or in-line skating.  · Cross-country skiing.  · Vigorous competitive sports, such as  football, basketball, and soccer.  · Jumping rope.  · Aerobic dancing.  How can I be more active in my day-to-day activities?  · Use the stairs instead of the elevator.  · Take a walk during your lunch break.  · If you drive, park your car farther away from work or school.  · If you take public transportation, get off one stop early and walk the rest of the way.  · Make all of your phone calls while standing up and walking around.  · Get up, stretch, and walk around every 30 minutes throughout the day.  What guidelines should I follow while exercising?  · Do not exercise so much that you hurt yourself, feel dizzy, or get very short of breath.  · Consult your health care provider prior to starting a new exercise program.  · Wear comfortable clothes and shoes with good support.  · Drink plenty of water while you exercise to prevent dehydration or heat stroke. Body water is lost during exercise and must be replaced.  · Work out until you breathe faster and your heart beats faster.  This information is not intended to replace advice given to you by your health care provider. Make sure you discuss any questions you have with your health care provider.  Document Released: 01/20/2012 Document Revised: 05/25/2017 Document Reviewed: 05/21/2015  [employer] Patient Education © 2018 [employer]      Calorie Counting for Weight Loss  Calories are units of energy. Your body needs a certain amount of calories from food to keep you going throughout the day. When you eat more calories than your body needs, your body stores the extra calories as fat. When you eat fewer calories than your body needs, your body burns fat to get the energy it needs.  Calorie counting means keeping track of how many calories you eat and drink each day. Calorie counting can be helpful if you need to lose weight. If you make sure to eat fewer calories than your body needs, you should lose weight. Ask your health care provider what a healthy  weight is for you.  For calorie counting to work, you will need to eat the right number of calories in a day in order to lose a healthy amount of weight per week. A dietitian can help you determine how many calories you need in a day and will give you suggestions on how to reach your calorie goal.  · A healthy amount of weight to lose per week is usually 1-2 lb (0.5-0.9 kg). This usually means that your daily calorie intake should be reduced by 500-750 calories.  · Eating 1,200 - 1,500 calories per day can help most women lose weight.  · Eating 1,500 - 1,800 calories per day can help most men lose weight.    What do I need to know about calorie counting?  In order to meet your daily calorie goal, you will need to:  · Find out how many calories are in each food you would like to eat. Try to do this before you eat.  · Decide how much of the food you plan to eat.  · Write down what you ate and how many calories it had. Doing this is called keeping a food log.    To successfully lose weight, it is important to balance calorie counting with a healthy lifestyle that includes regular activity. Aim for 150 minutes of moderate exercise (such as walking) or 75 minutes of vigorous exercise (such as running) each week.  Where do I find calorie information?    The number of calories in a food can be found on a Nutrition Facts label. If a food does not have a Nutrition Facts label, try to look up the calories online or ask your dietitian for help.  Remember that calories are listed per serving. If you choose to have more than one serving of a food, you will have to multiply the calories per serving by the amount of servings you plan to eat. For example, the label on a package of bread might say that a serving size is 1 slice and that there are 90 calories in a serving. If you eat 1 slice, you will have eaten 90 calories. If you eat 2 slices, you will have eaten 180 calories.  How do I keep a food log?  Immediately after each  "meal, record the following information in your food log:  · What you ate. Don't forget to include toppings, sauces, and other extras on the food.  · How much you ate. This can be measured in cups, ounces, or number of items.  · How many calories each food and drink had.  · The total number of calories in the meal.    Keep your food log near you, such as in a small notebook in your pocket, or use a mobile kd or website. Some programs will calculate calories for you and show you how many calories you have left for the day to meet your goal.  What are some calorie counting tips?  · Use your calories on foods and drinks that will fill you up and not leave you hungry:  ? Some examples of foods that fill you up are nuts and nut butters, vegetables, lean proteins, and high-fiber foods like whole grains. High-fiber foods are foods with more than 5 g fiber per serving.  ? Drinks such as sodas, specialty coffee drinks, alcohol, and juices have a lot of calories, yet do not fill you up.  · Eat nutritious foods and avoid empty calories. Empty calories are calories you get from foods or beverages that do not have many vitamins or protein, such as candy, sweets, and soda. It is better to have a nutritious high-calorie food (such as an avocado) than a food with few nutrients (such as a bag of chips).  · Know how many calories are in the foods you eat most often. This will help you calculate calorie counts faster.  · Pay attention to calories in drinks. Low-calorie drinks include water and unsweetened drinks.  · Pay attention to nutrition labels for \"low fat\" or \"fat free\" foods. These foods sometimes have the same amount of calories or more calories than the full fat versions. They also often have added sugar, starch, or salt, to make up for flavor that was removed with the fat.  · Find a way of tracking calories that works for you. Get creative. Try different apps or programs if writing down calories does not work for you.  What " are some portion control tips?  · Know how many calories are in a serving. This will help you know how many servings of a certain food you can have.  · Use a measuring cup to measure serving sizes. You could also try weighing out portions on a kitchen scale. With time, you will be able to estimate serving sizes for some foods.  · Take some time to put servings of different foods on your favorite plates, bowls, and cups so you know what a serving looks like.  · Try not to eat straight from a bag or box. Doing this can lead to overeating. Put the amount you would like to eat in a cup or on a plate to make sure you are eating the right portion.  · Use smaller plates, glasses, and bowls to prevent overeating.  · Try not to multitask (for example, watch TV or use your computer) while eating. If it is time to eat, sit down at a table and enjoy your food. This will help you to know when you are full. It will also help you to be aware of what you are eating and how much you are eating.  What are tips for following this plan?  Reading food labels  · Check the calorie count compared to the serving size. The serving size may be smaller than what you are used to eating.  · Check the source of the calories. Make sure the food you are eating is high in vitamins and protein and low in saturated and trans fats.  Shopping  · Read nutrition labels while you shop. This will help you make healthy decisions before you decide to purchase your food.  · Make a grocery list and stick to it.  Cooking  · Try to cook your favorite foods in a healthier way. For example, try baking instead of frying.  · Use low-fat dairy products.  Meal planning  · Use more fruits and vegetables. Half of your plate should be fruits and vegetables.  · Include lean proteins like poultry and fish.  How do I count calories when eating out?  · Ask for smaller portion sizes.  · Consider sharing an entree and sides instead of getting your own entree.  · If you get your  own entree, eat only half. Ask for a box at the beginning of your meal and put the rest of your entree in it so you are not tempted to eat it.  · If calories are listed on the menu, choose the lower calorie options.  · Choose dishes that include vegetables, fruits, whole grains, low-fat dairy products, and lean protein.  · Choose items that are boiled, broiled, grilled, or steamed. Stay away from items that are buttered, battered, fried, or served with cream sauce. Items labeled “crispy” are usually fried, unless stated otherwise.  · Choose water, low-fat milk, unsweetened iced tea, or other drinks without added sugar. If you want an alcoholic beverage, choose a lower calorie option such as a glass of wine or light beer.  · Ask for dressings, sauces, and syrups on the side. These are usually high in calories, so you should limit the amount you eat.  · If you want a salad, choose a garden salad and ask for grilled meats. Avoid extra toppings like costa, cheese, or fried items. Ask for the dressing on the side, or ask for olive oil and vinegar or lemon to use as dressing.  · Estimate how many servings of a food you are given. For example, a serving of cooked rice is ½ cup or about the size of half a baseball. Knowing serving sizes will help you be aware of how much food you are eating at restaurants. The list below tells you how big or small some common portion sizes are based on everyday objects:  ? 1 oz--4 stacked dice.  ? 3 oz--1 deck of cards.  ? 1 tsp--1 die.  ? 1 Tbsp--½ a ping-pong ball.  ? 2 Tbsp--1 ping-pong ball.  ? ½ cup--½ baseball.  ? 1 cup--1 baseball.  Summary  · Calorie counting means keeping track of how many calories you eat and drink each day. If you eat fewer calories than your body needs, you should lose weight.  · A healthy amount of weight to lose per week is usually 1-2 lb (0.5-0.9 kg). This usually means reducing your daily calorie intake by 500-750 calories.  · The number of calories in a  food can be found on a Nutrition Facts label. If a food does not have a Nutrition Facts label, try to look up the calories online or ask your dietitian for help.  · Use your calories on foods and drinks that will fill you up, and not on foods and drinks that will leave you hungry.  · Use smaller plates, glasses, and bowls to prevent overeating.  This information is not intended to replace advice given to you by your health care provider. Make sure you discuss any questions you have with your health care provider.  Document Released: 12/18/2006 Document Revised: 11/17/2017 Document Reviewed: 11/17/2017  [employer] Interactive Patient Education © 2018 [employer]

## 2018-12-03 ENCOUNTER — OFFICE VISIT (OUTPATIENT)
Dept: FAMILY MEDICINE CLINIC | Facility: CLINIC | Age: 63
End: 2018-12-03

## 2018-12-03 VITALS
DIASTOLIC BLOOD PRESSURE: 78 MMHG | RESPIRATION RATE: 16 BRPM | HEIGHT: 72 IN | WEIGHT: 264 LBS | HEART RATE: 86 BPM | BODY MASS INDEX: 35.76 KG/M2 | OXYGEN SATURATION: 97 % | SYSTOLIC BLOOD PRESSURE: 162 MMHG | TEMPERATURE: 98.8 F

## 2018-12-03 DIAGNOSIS — R05.3 CHRONIC COUGH: Primary | ICD-10-CM

## 2018-12-03 PROCEDURE — 99213 OFFICE O/P EST LOW 20 MIN: CPT | Performed by: FAMILY MEDICINE

## 2018-12-03 RX ORDER — AZITHROMYCIN 250 MG/1
TABLET, FILM COATED ORAL
Qty: 6 TABLET | Refills: 0 | Status: SHIPPED | OUTPATIENT
Start: 2018-12-03 | End: 2019-04-11

## 2018-12-03 NOTE — PROGRESS NOTES
"Subjective   Junior Pollard Jr. is a 63 y.o. male.     CC: Cough    History of Present Illness     Pt comes in today c/o a 2-3 week h/o cough. Granddaughter had it first, then his wife, and now pt has this. No production. No wheezing, no F/C, no sinus pressure.    The following portions of the patient's history were reviewed and updated as appropriate: allergies, current medications, past family history, past medical history, past social history, past surgical history and problem list.    Review of Systems   Constitutional: Negative for activity change, chills, fatigue and fever.   Respiratory: Positive for choking. Negative for cough and shortness of breath.    Cardiovascular: Negative for chest pain and palpitations.   Gastrointestinal: Negative for abdominal pain.   Endocrine: Negative for cold intolerance.   Psychiatric/Behavioral: Negative for behavioral problems and dysphoric mood. The patient is not nervous/anxious.      /78   Pulse 86   Temp 98.8 °F (37.1 °C) (Oral)   Resp 16   Ht 182.9 cm (72\")   Wt 120 kg (264 lb)   SpO2 97%   BMI 35.80 kg/m²     Objective   Physical Exam   Constitutional: He appears well-developed and well-nourished.   Neck: Neck supple. No thyromegaly present.   Cardiovascular: Normal rate and regular rhythm.   No murmur heard.  Pulmonary/Chest: Effort normal and breath sounds normal.   Abdominal: Bowel sounds are normal. There is no tenderness.   Psychiatric: He has a normal mood and affect. His behavior is normal.   Nursing note and vitals reviewed.      Assessment/Plan   Junior was seen today for cough.    Diagnoses and all orders for this visit:    Chronic cough  -     azithromycin (ZITHROMAX Z-DUNCAN) 250 MG tablet; Take 2 tablets the first day, then 1 tablet daily for 4 days.               "

## 2019-04-04 ENCOUNTER — TELEPHONE (OUTPATIENT)
Dept: FAMILY MEDICINE CLINIC | Facility: CLINIC | Age: 64
End: 2019-04-04

## 2019-04-04 DIAGNOSIS — I10 BENIGN ESSENTIAL HYPERTENSION: ICD-10-CM

## 2019-04-04 DIAGNOSIS — Z79.4 TYPE 2 DIABETES MELLITUS WITH DIABETIC NEPHROPATHY, WITH LONG-TERM CURRENT USE OF INSULIN (HCC): Primary | ICD-10-CM

## 2019-04-04 DIAGNOSIS — N18.30 CHRONIC KIDNEY DISEASE, STAGE III (MODERATE) (HCC): ICD-10-CM

## 2019-04-04 DIAGNOSIS — Z12.5 SCREENING FOR PROSTATE CANCER: ICD-10-CM

## 2019-04-04 DIAGNOSIS — N42.9 DISORDER OF PROSTATE: ICD-10-CM

## 2019-04-04 DIAGNOSIS — E78.00 HYPERCHOLESTEROLEMIA: ICD-10-CM

## 2019-04-04 DIAGNOSIS — E11.21 TYPE 2 DIABETES MELLITUS WITH DIABETIC NEPHROPATHY, WITH LONG-TERM CURRENT USE OF INSULIN (HCC): Primary | ICD-10-CM

## 2019-04-09 LAB
ALBUMIN SERPL-MCNC: 4.8 G/DL (ref 3.5–5.2)
ALBUMIN/GLOB SERPL: 1.5 G/DL
ALP SERPL-CCNC: 64 U/L (ref 39–117)
ALT SERPL-CCNC: 21 U/L (ref 1–41)
AST SERPL-CCNC: 16 U/L (ref 1–40)
BASOPHILS # BLD AUTO: 0.08 10*3/MM3 (ref 0–0.2)
BASOPHILS NFR BLD AUTO: 1.2 % (ref 0–1.5)
BILIRUB SERPL-MCNC: 0.6 MG/DL (ref 0.2–1.2)
BUN SERPL-MCNC: 19 MG/DL (ref 8–23)
BUN/CREAT SERPL: 11.6 (ref 7–25)
CALCIUM SERPL-MCNC: 10.2 MG/DL (ref 8.6–10.5)
CHLORIDE SERPL-SCNC: 104 MMOL/L (ref 98–107)
CHOLEST SERPL-MCNC: 202 MG/DL (ref 0–200)
CO2 SERPL-SCNC: 23.4 MMOL/L (ref 22–29)
CREAT SERPL-MCNC: 1.64 MG/DL (ref 0.76–1.27)
EOSINOPHIL # BLD AUTO: 0.1 10*3/MM3 (ref 0–0.4)
EOSINOPHIL NFR BLD AUTO: 1.5 % (ref 0.3–6.2)
ERYTHROCYTE [DISTWIDTH] IN BLOOD BY AUTOMATED COUNT: 13.2 % (ref 12.3–15.4)
GLOBULIN SER CALC-MCNC: 3.1 GM/DL
GLUCOSE SERPL-MCNC: 209 MG/DL (ref 65–99)
HBA1C MFR BLD: 8.3 % (ref 4.8–5.6)
HCT VFR BLD AUTO: 43.1 % (ref 37.5–51)
HDLC SERPL-MCNC: 29 MG/DL (ref 40–60)
HGB BLD-MCNC: 13.9 G/DL (ref 13–17.7)
IMM GRANULOCYTES # BLD AUTO: 0.02 10*3/MM3 (ref 0–0.05)
IMM GRANULOCYTES NFR BLD AUTO: 0.3 % (ref 0–0.5)
LDLC SERPL CALC-MCNC: 148 MG/DL (ref 0–100)
LDLC/HDLC SERPL: 5.1 {RATIO}
LYMPHOCYTES # BLD AUTO: 2.28 10*3/MM3 (ref 0.7–3.1)
LYMPHOCYTES NFR BLD AUTO: 34.8 % (ref 19.6–45.3)
MCH RBC QN AUTO: 30.2 PG (ref 26.6–33)
MCHC RBC AUTO-ENTMCNC: 32.3 G/DL (ref 31.5–35.7)
MCV RBC AUTO: 93.5 FL (ref 79–97)
MICROALBUMIN UR-MCNC: 325.5 UG/ML
MONOCYTES # BLD AUTO: 0.4 10*3/MM3 (ref 0.1–0.9)
MONOCYTES NFR BLD AUTO: 6.1 % (ref 5–12)
NEUTROPHILS # BLD AUTO: 3.67 10*3/MM3 (ref 1.4–7)
NEUTROPHILS NFR BLD AUTO: 56.1 % (ref 42.7–76)
NRBC BLD AUTO-RTO: 0 /100 WBC (ref 0–0)
PLATELET # BLD AUTO: 267 10*3/MM3 (ref 140–450)
POTASSIUM SERPL-SCNC: 4.6 MMOL/L (ref 3.5–5.2)
PROT SERPL-MCNC: 7.9 G/DL (ref 6–8.5)
PSA SERPL-MCNC: 0.53 NG/ML (ref 0–4)
RBC # BLD AUTO: 4.61 10*6/MM3 (ref 4.14–5.8)
SODIUM SERPL-SCNC: 141 MMOL/L (ref 136–145)
TRIGL SERPL-MCNC: 126 MG/DL (ref 0–150)
TSH SERPL DL<=0.005 MIU/L-ACNC: 1.93 MIU/ML (ref 0.27–4.2)
VLDLC SERPL CALC-MCNC: 25.2 MG/DL
WBC # BLD AUTO: 6.55 10*3/MM3 (ref 3.4–10.8)

## 2019-04-11 ENCOUNTER — OFFICE VISIT (OUTPATIENT)
Dept: FAMILY MEDICINE CLINIC | Facility: CLINIC | Age: 64
End: 2019-04-11

## 2019-04-11 VITALS
TEMPERATURE: 97.8 F | HEIGHT: 72 IN | SYSTOLIC BLOOD PRESSURE: 134 MMHG | BODY MASS INDEX: 37.25 KG/M2 | HEART RATE: 90 BPM | WEIGHT: 275 LBS | DIASTOLIC BLOOD PRESSURE: 64 MMHG | RESPIRATION RATE: 16 BRPM

## 2019-04-11 DIAGNOSIS — E11.21 TYPE 2 DIABETES WITH NEPHROPATHY (HCC): ICD-10-CM

## 2019-04-11 DIAGNOSIS — E78.00 HYPERCHOLESTEROLEMIA: ICD-10-CM

## 2019-04-11 DIAGNOSIS — I10 BENIGN ESSENTIAL HYPERTENSION: ICD-10-CM

## 2019-04-11 PROCEDURE — 99214 OFFICE O/P EST MOD 30 MIN: CPT | Performed by: FAMILY MEDICINE

## 2019-04-11 RX ORDER — GLIMEPIRIDE 2 MG/1
2 TABLET ORAL
Qty: 180 TABLET | Refills: 1 | Status: SHIPPED | OUTPATIENT
Start: 2019-04-11 | End: 2019-07-11 | Stop reason: SDUPTHER

## 2019-04-11 RX ORDER — LISINOPRIL 40 MG/1
40 TABLET ORAL DAILY
Qty: 90 TABLET | Refills: 1 | Status: SHIPPED | OUTPATIENT
Start: 2019-04-11 | End: 2019-07-11 | Stop reason: SDUPTHER

## 2019-04-11 RX ORDER — PIOGLITAZONEHYDROCHLORIDE 30 MG/1
30 TABLET ORAL DAILY
Qty: 90 TABLET | Refills: 1 | Status: SHIPPED | OUTPATIENT
Start: 2019-04-11 | End: 2019-07-11 | Stop reason: SDUPTHER

## 2019-04-11 RX ORDER — ATORVASTATIN CALCIUM 40 MG/1
40 TABLET, FILM COATED ORAL DAILY
Qty: 90 TABLET | Refills: 1 | Status: SHIPPED | OUTPATIENT
Start: 2019-04-11 | End: 2019-07-11 | Stop reason: SDUPTHER

## 2019-04-11 NOTE — PROGRESS NOTES
Subjective   Junior Pollard Jr. is a 63 y.o. male.     History of Present Illness     Chief Complaint:   Chief Complaint   Patient presents with   • Diabetes     med refill = lab results - declines welcome to med phys    • Hyperlipidemia     [facility]    • Hypertension       Junior Pollard Jr. 63 y.o. male who presents today for Medical Management of the below listed issues and medication refills.  he has a problem list of   Patient Active Problem List   Diagnosis   • Abnormal cardiovascular stress test   • Multiple actinic keratoses   • Chronic coronary artery disease   • Benign essential hypertension   • Chronic kidney disease, stage III (moderate) (CMS/HCC)   • Diabetes mellitus with renal complications (CMS/HCC)   • Hypercholesterolemia   • Elevated creatine kinase level   • Onychomycosis of toenail   • Osteoarthritis   • Proteinuria   • Type 2 diabetes with nephropathy (CMS/HCC)   .  Since the last visit, he has overall felt well, although is candid and admits he has not been as good about taking his Lipitor, and has fallen off the wagon with eating and exercise.  he has been compliant with   Current Outpatient Medications:   •  atorvastatin (LIPITOR) 40 MG tablet, Take 1 tablet by mouth Daily., Disp: 90 tablet, Rfl: 1  •  glimepiride (AMARYL) 2 MG tablet, Take 1 tablet by mouth 2 (Two) Times a Day., Disp: 180 tablet, Rfl: 1  •  Liraglutide (VICTOZA) 18 MG/3ML solution pen-injector injection, Inject 1.8 mg under the skin into the appropriate area as directed Daily., Disp: 9 pen, Rfl: 1  •  lisinopril (PRINIVIL,ZESTRIL) 40 MG tablet, Take 1 tablet by mouth Daily., Disp: 90 tablet, Rfl: 1  •  pioglitazone (ACTOS) 30 MG tablet, Take 1 tablet by mouth Daily., Disp: 90 tablet, Rfl: 1  •  glucose blood (ACCU-CHEK COMPACT PLUS) test strip, 1 each by Other route Daily. Use as instructed, Disp: 100 each, Rfl: 3.  he denies medication side effects.    All of the chronic condition(s) listed above are stable w/o  "issues.    /64   Pulse 90   Temp 97.8 °F (36.6 °C) (Oral)   Resp 16   Ht 182.9 cm (72\")   Wt 125 kg (275 lb)   BMI 37.30 kg/m²     Results for orders placed or performed in visit on 04/04/19   Comprehensive metabolic panel   Result Value Ref Range    Glucose 209 (H) 65 - 99 mg/dL    BUN 19 8 - 23 mg/dL    Creatinine 1.64 (H) 0.76 - 1.27 mg/dL    eGFR Non African Am 42 (L) >60 mL/min/1.73    eGFR African Am 51 (L) >60 mL/min/1.73    BUN/Creatinine Ratio 11.6 7.0 - 25.0    Sodium 141 136 - 145 mmol/L    Potassium 4.6 3.5 - 5.2 mmol/L    Chloride 104 98 - 107 mmol/L    Total CO2 23.4 22.0 - 29.0 mmol/L    Calcium 10.2 8.6 - 10.5 mg/dL    Total Protein 7.9 6.0 - 8.5 g/dL    Albumin 4.80 3.50 - 5.20 g/dL    Globulin 3.1 gm/dL    A/G Ratio 1.5 g/dL    Total Bilirubin 0.6 0.2 - 1.2 mg/dL    Alkaline Phosphatase 64 39 - 117 U/L    AST (SGOT) 16 1 - 40 U/L    ALT (SGPT) 21 1 - 41 U/L   Lipid Panel With LDL/HDL Ratio   Result Value Ref Range    Total Cholesterol 202 (H) 0 - 200 mg/dL    Triglycerides 126 0 - 150 mg/dL    HDL Cholesterol 29 (L) 40 - 60 mg/dL    VLDL Cholesterol 25.2 mg/dL    LDL Cholesterol  148 (H) 0 - 100 mg/dL    LDL/HDL Ratio 5.10    TSH   Result Value Ref Range    TSH 1.930 0.270 - 4.200 mIU/mL   MicroAlbumin, Urine, Random - Urine, Clean Catch   Result Value Ref Range    Microalbumin, Urine 325.5 Not Estab. ug/mL   Hemoglobin A1c   Result Value Ref Range    Hemoglobin A1C 8.30 (H) 4.80 - 5.60 %   PSA DIAGNOSTIC   Result Value Ref Range    PSA 0.526 0.000 - 4.000 ng/mL   CBC and Differential   Result Value Ref Range    WBC 6.55 3.40 - 10.80 10*3/mm3    RBC 4.61 4.14 - 5.80 10*6/mm3    Hemoglobin 13.9 13.0 - 17.7 g/dL    Hematocrit 43.1 37.5 - 51.0 %    MCV 93.5 79.0 - 97.0 fL    MCH 30.2 26.6 - 33.0 pg    MCHC 32.3 31.5 - 35.7 g/dL    RDW 13.2 12.3 - 15.4 %    Platelets 267 140 - 450 10*3/mm3    Neutrophil Rel % 56.1 42.7 - 76.0 %    Lymphocyte Rel % 34.8 19.6 - 45.3 %    Monocyte Rel % 6.1 " 5.0 - 12.0 %    Eosinophil Rel % 1.5 0.3 - 6.2 %    Basophil Rel % 1.2 0.0 - 1.5 %    Neutrophils Absolute 3.67 1.40 - 7.00 10*3/mm3    Lymphocytes Absolute 2.28 0.70 - 3.10 10*3/mm3    Monocytes Absolute 0.40 0.10 - 0.90 10*3/mm3    Eosinophils Absolute 0.10 0.00 - 0.40 10*3/mm3    Basophils Absolute 0.08 0.00 - 0.20 10*3/mm3    Immature Granulocyte Rel % 0.3 0.0 - 0.5 %    Immature Grans Absolute 0.02 0.00 - 0.05 10*3/mm3    nRBC 0.0 0.0 - 0.0 /100 WBC           The following portions of the patient's history were reviewed and updated as appropriate: allergies, current medications, past family history, past medical history, past social history, past surgical history and problem list.    Review of Systems   Constitutional: Negative for activity change, chills, fatigue and fever.   Respiratory: Negative for cough and shortness of breath.    Cardiovascular: Negative for chest pain and palpitations.   Gastrointestinal: Negative for abdominal pain.   Endocrine: Negative for cold intolerance.   Psychiatric/Behavioral: Negative for behavioral problems and dysphoric mood. The patient is not nervous/anxious.        Objective   Physical Exam   Constitutional: He appears well-developed and well-nourished.   Neck: Neck supple. No thyromegaly present.   Cardiovascular: Normal rate and regular rhythm.   No murmur heard.  Pulmonary/Chest: Effort normal and breath sounds normal.   Abdominal: Bowel sounds are normal. There is no tenderness.   Psychiatric: He has a normal mood and affect. His behavior is normal.   Nursing note and vitals reviewed.  Labs reviewed with pt today during visit. All questions answered.      Assessment/Plan   Junior was seen today for diabetes, hyperlipidemia and hypertension.    Diagnoses and all orders for this visit:    Type 2 diabetes with nephropathy (CMS/[facility])  Comments:  uncontrolled  Orders:  -     Liraglutide (VICTOZA) 18 MG/3ML solution pen-injector injection; Inject 1.8 mg under the skin into  the appropriate area as directed Daily.  -     pioglitazone (ACTOS) 30 MG tablet; Take 1 tablet by mouth Daily.  -     glimepiride (AMARYL) 2 MG tablet; Take 1 tablet by mouth 2 (Two) Times a Day.  -     Basic Metabolic Panel; Future  -     Hemoglobin A1c; Future  -     Lipid Panel; Future    Benign essential hypertension  -     lisinopril (PRINIVIL,ZESTRIL) 40 MG tablet; Take 1 tablet by mouth Daily.  -     Basic Metabolic Panel; Future  -     Lipid Panel; Future    Hypercholesterolemia  -     atorvastatin (LIPITOR) 40 MG tablet; Take 1 tablet by mouth Daily.  -     Lipid Panel; Future    Other orders  -     Cancel: glucose blood (ACCU-CHEK COMPACT PLUS) test strip; 1 each by Other route Daily. Use as instructed    Pt encouraged to f/u with his nephrologist as creatinine worsening. Pt seeing eye MD next week.  Pt to increase healthy diet, exercise most days, and lose some weight.

## 2019-06-11 ENCOUNTER — RESULTS ENCOUNTER (OUTPATIENT)
Dept: FAMILY MEDICINE CLINIC | Facility: CLINIC | Age: 64
End: 2019-06-11

## 2019-06-11 DIAGNOSIS — I10 BENIGN ESSENTIAL HYPERTENSION: ICD-10-CM

## 2019-06-11 DIAGNOSIS — E11.21 TYPE 2 DIABETES WITH NEPHROPATHY (HCC): ICD-10-CM

## 2019-06-11 DIAGNOSIS — E78.00 HYPERCHOLESTEROLEMIA: ICD-10-CM

## 2019-07-04 LAB
BUN SERPL-MCNC: 29 MG/DL (ref 8–23)
BUN/CREAT SERPL: 17.6 (ref 7–25)
CALCIUM SERPL-MCNC: 9.4 MG/DL (ref 8.6–10.5)
CHLORIDE SERPL-SCNC: 104 MMOL/L (ref 98–107)
CHOLEST SERPL-MCNC: 148 MG/DL (ref 0–200)
CO2 SERPL-SCNC: 24.8 MMOL/L (ref 22–29)
CREAT SERPL-MCNC: 1.65 MG/DL (ref 0.76–1.27)
GLUCOSE SERPL-MCNC: 125 MG/DL (ref 65–99)
HBA1C MFR BLD: 7.1 % (ref 4.8–5.6)
HDLC SERPL-MCNC: 29 MG/DL (ref 40–60)
LDLC SERPL CALC-MCNC: 96 MG/DL (ref 0–100)
POTASSIUM SERPL-SCNC: 4.7 MMOL/L (ref 3.5–5.2)
SODIUM SERPL-SCNC: 141 MMOL/L (ref 136–145)
TRIGL SERPL-MCNC: 116 MG/DL (ref 0–150)
VLDLC SERPL CALC-MCNC: 23.2 MG/DL

## 2019-07-11 ENCOUNTER — OFFICE VISIT (OUTPATIENT)
Dept: FAMILY MEDICINE CLINIC | Facility: CLINIC | Age: 64
End: 2019-07-11

## 2019-07-11 VITALS
RESPIRATION RATE: 16 BRPM | HEIGHT: 72 IN | TEMPERATURE: 98.8 F | BODY MASS INDEX: 35.35 KG/M2 | WEIGHT: 261 LBS | DIASTOLIC BLOOD PRESSURE: 61 MMHG | HEART RATE: 68 BPM | SYSTOLIC BLOOD PRESSURE: 130 MMHG

## 2019-07-11 DIAGNOSIS — I10 BENIGN ESSENTIAL HYPERTENSION: ICD-10-CM

## 2019-07-11 DIAGNOSIS — Z00.00 WELCOME TO MEDICARE PREVENTIVE VISIT: Primary | ICD-10-CM

## 2019-07-11 DIAGNOSIS — E78.00 HYPERCHOLESTEROLEMIA: ICD-10-CM

## 2019-07-11 DIAGNOSIS — E11.21 TYPE 2 DIABETES WITH NEPHROPATHY (HCC): ICD-10-CM

## 2019-07-11 PROCEDURE — 99214 OFFICE O/P EST MOD 30 MIN: CPT | Performed by: FAMILY MEDICINE

## 2019-07-11 PROCEDURE — 96160 PT-FOCUSED HLTH RISK ASSMT: CPT | Performed by: FAMILY MEDICINE

## 2019-07-11 PROCEDURE — G0402 INITIAL PREVENTIVE EXAM: HCPCS | Performed by: FAMILY MEDICINE

## 2019-07-11 RX ORDER — ATORVASTATIN CALCIUM 40 MG/1
40 TABLET, FILM COATED ORAL DAILY
Qty: 90 TABLET | Refills: 1 | Status: SHIPPED | OUTPATIENT
Start: 2019-07-11 | End: 2020-01-15 | Stop reason: SDUPTHER

## 2019-07-11 RX ORDER — GLIMEPIRIDE 2 MG/1
2 TABLET ORAL
Qty: 180 TABLET | Refills: 1 | Status: SHIPPED | OUTPATIENT
Start: 2019-07-11 | End: 2020-01-15 | Stop reason: SDUPTHER

## 2019-07-11 RX ORDER — LISINOPRIL 40 MG/1
40 TABLET ORAL DAILY
Qty: 90 TABLET | Refills: 1 | Status: SHIPPED | OUTPATIENT
Start: 2019-07-11 | End: 2020-01-15 | Stop reason: SDUPTHER

## 2019-07-11 RX ORDER — PIOGLITAZONEHYDROCHLORIDE 30 MG/1
30 TABLET ORAL DAILY
Qty: 90 TABLET | Refills: 1 | Status: SHIPPED | OUTPATIENT
Start: 2019-07-11 | End: 2020-01-15 | Stop reason: SDUPTHER

## 2019-07-11 NOTE — PROGRESS NOTES
QUICK REFERENCE INFORMATION:  The ABCs of the Annual Wellness Visit    Welcome to Medicare Visit    HEALTH RISK ASSESSMENT    : 1955    Recent Hospitalizations:  No hospitalization(s) within the last year..  ccc      Current Medical Providers:  Patient Care Team:  Boogie Interiano MD as PCP - General (Family Medicine)  Gerald Bravo MD as Consulting Physician (Nephrology)        Smoking Status:  Social History     Tobacco Use   Smoking Status Never Smoker   Smokeless Tobacco Never Used       Alcohol Consumption:  Social History     Substance and Sexual Activity   Alcohol Use No       Depression Screen:   PHQ-2/PHQ-9 Depression Screening 2019   Little interest or pleasure in doing things 0   Feeling down, depressed, or hopeless 0   Total Score 0       Health Habits and Functional and Cognitive Screening:  Functional & Cognitive Status 2019   Do you have difficulty preparing food and eating? No   Do you have difficulty bathing yourself, getting dressed or grooming yourself? No   Do you have difficulty using the toilet? No   Do you have difficulty moving around from place to place? No   Do you have trouble with steps or getting out of a bed or a chair? No   In the past year have you fallen or experienced a near fall? No   Current Diet Well Balanced Diet   Dental Exam Up to date   Eye Exam Up to date   Exercise (times per week) 0 times per week   Current Exercise Activities Include None   Do you need help using the phone?  No   Are you deaf or do you have serious difficulty hearing?  No   Do you need help with transportation? No   Do you need help shopping? No   Do you need help preparing meals?  No   Do you need help with housework?  No   Do you need help with laundry? No   Do you need help taking your medications? No   Do you need help managing money? No   Do you ever drive or ride in a car without wearing a seat belt? No   Have you felt unusual stress, anger or loneliness in the last month?  No   Who do you live with? Child   If you need help, do you have trouble finding someone available to you? Yes   Have you been bothered in the last four weeks by sexual problems? No   Do you have difficulty concentrating, remembering or making decisions? No       Visual Acuity:  No exam data present    Does the patient have evidence of cognitive impairment? No    Asiprin use counseling: Taking ASA appropriately as indicated      Recent Lab Results:    Lab Results   Component Value Date     (H) 07/03/2019     Lab Results   Component Value Date    HGBA1C 7.10 (H) 07/03/2019     Lab Results   Component Value Date    TRIG 116 07/03/2019    HDL 29 (L) 07/03/2019    VLDL 23.2 07/03/2019    LDLHDL 5.10 04/08/2019           Age-appropriate Screening Schedule:  Refer to the list below for future screening recommendations based on patient's age, sex and/or medical conditions. Orders for these recommended tests are listed in the plan section. The patient has been provided with a written plan.    Health Maintenance   Topic Date Due   • PNEUMOCOCCAL VACCINES (65+ LOW/MEDIUM RISK) (1 of 2 - PCV13) 11/12/2018   • DIABETIC FOOT EXAM  07/24/2019 (Originally 8/22/2018)   • ZOSTER VACCINE (2 of 2) 04/19/2020 (Originally 2/26/2014)   • INFLUENZA VACCINE  08/01/2019   • HEMOGLOBIN A1C  01/03/2020   • PROSTATE CANCER SCREENING  04/08/2020   • URINE MICROALBUMIN  04/08/2020   • DIABETIC EYE EXAM  04/17/2020   • LIPID PANEL  07/03/2020   • TDAP/TD VACCINES (3 - Td) 07/30/2028   • COLONOSCOPY  Discontinued        Subjective   History of Present Illness    Junior Pollard Jr. is a 63 y.o. male who presents for an Annual Wellness Visit.    The following portions of the patient's history were reviewed and updated as appropriate: allergies, current medications, past family history, past medical history, past social history, past surgical history and problem list.    Outpatient Medications Prior to Visit   Medication Sig Dispense Refill  "  • glucose blood (ACCU-CHEK COMPACT PLUS) test strip 1 each by Other route Daily. Use as instructed 100 each 3   • INV ALLIANCE, AMB, KIT, Pharmacy Label, Take  by mouth 1 (One) Time.     • atorvastatin (LIPITOR) 40 MG tablet Take 1 tablet by mouth Daily. 90 tablet 1   • glimepiride (AMARYL) 2 MG tablet Take 1 tablet by mouth 2 (Two) Times a Day. 180 tablet 1   • Liraglutide (VICTOZA) 18 MG/3ML solution pen-injector injection Inject 1.8 mg under the skin into the appropriate area as directed Daily. 9 pen 1   • lisinopril (PRINIVIL,ZESTRIL) 40 MG tablet Take 1 tablet by mouth Daily. 90 tablet 1   • pioglitazone (ACTOS) 30 MG tablet Take 1 tablet by mouth Daily. 90 tablet 1     No facility-administered medications prior to visit.        Patient Active Problem List   Diagnosis   • Abnormal cardiovascular stress test   • Multiple actinic keratoses   • Chronic coronary artery disease   • Benign essential hypertension   • Chronic kidney disease, stage III (moderate) (CMS/HCC)   • Diabetes mellitus with renal complications (CMS/HCC)   • Hypercholesterolemia   • Elevated creatine kinase level   • Onychomycosis of toenail   • Osteoarthritis   • Proteinuria   • Type 2 diabetes with nephropathy (CMS/HCC)       Advance Care Planning:  Patient does not have an advance directive - information provided to the patient today    Identification of Risk Factors:  Risk factors include: Cardiovascular risk.    Review of Systems    Compared to one year ago, the patient feels his physical health is the same.  Compared to one year ago, the patient feels his mental health is the same.    Objective     Physical Exam    Vitals:    07/11/19 0733   BP: 130/61   Pulse: 68   Resp: 16   Temp: 98.8 °F (37.1 °C)   TempSrc: Oral   Weight: 118 kg (261 lb)   Height: 182.9 cm (72\")   PainSc:   4   PainLoc: Knee       Patient's Body mass index is 35.4 kg/m². BMI is above normal parameters. Recommendations include: exercise counseling and nutrition " counseling.      Procedure   Procedures       Assessment/Plan   Patient Self-Management and Personalized Health Advice  The patient has been provided with information about: diet, exercise and weight management and preventive services including:   · Exercise counseling provided, Fall Risk assessment done, Nutrition counseling provided.    Visit Diagnoses:    ICD-10-CM ICD-9-CM   1. Welcome to Medicare preventive visit Z00.00 V70.0   2. Type 2 diabetes with nephropathy (CMS/[facility]) E11.21 250.40     583.81   3. Hypercholesterolemia E78.00 272.0   4. Benign essential hypertension I10 401.1       No orders of the defined types were placed in this encounter.      Outpatient Encounter Medications as of 7/11/2019   Medication Sig Dispense Refill   • atorvastatin (LIPITOR) 40 MG tablet Take 1 tablet by mouth Daily. 90 tablet 1   • glimepiride (AMARYL) 2 MG tablet Take 1 tablet by mouth 2 (Two) Times a Day. 180 tablet 1   • glucose blood (ACCU-CHEK COMPACT PLUS) test strip 1 each by Other route Daily. Use as instructed 100 each 3   • [facility], AMB, KIT, Pharmacy Label, Take  by mouth 1 (One) Time.     • Liraglutide (VICTOZA) 18 MG/3ML solution pen-injector injection Inject 1.8 mg under the skin into the appropriate area as directed Daily. 9 pen 1   • lisinopril (PRINIVIL,ZESTRIL) 40 MG tablet Take 1 tablet by mouth Daily. 90 tablet 1   • pioglitazone (ACTOS) 30 MG tablet Take 1 tablet by mouth Daily. 90 tablet 1   • [DISCONTINUED] atorvastatin (LIPITOR) 40 MG tablet Take 1 tablet by mouth Daily. 90 tablet 1   • [DISCONTINUED] glimepiride (AMARYL) 2 MG tablet Take 1 tablet by mouth 2 (Two) Times a Day. 180 tablet 1   • [DISCONTINUED] Liraglutide (VICTOZA) 18 MG/3ML solution pen-injector injection Inject 1.8 mg under the skin into the appropriate area as directed Daily. 9 pen 1   • [DISCONTINUED] lisinopril (PRINIVIL,ZESTRIL) 40 MG tablet Take 1 tablet by mouth Daily. 90 tablet 1   • [DISCONTINUED] pioglitazone (ACTOS) 30 MG  tablet Take 1 tablet by mouth Daily. 90 tablet 1   • Insulin Pen Needle 32G X 5 MM misc Use  each 3     No facility-administered encounter medications on file as of 7/11/2019.        Reviewed use of high risk medication in the elderly: not applicable  Reviewed for potential of harmful drug interactions in the elderly: not applicable    Follow Up:  Return in about 6 months (around 1/11/2020) for Recheck.     An After Visit Summary and PPPS with all of these plans were given to the patient.

## 2019-07-11 NOTE — PROGRESS NOTES
Subjective   Junior Pollard Jr. is a 63 y.o. male.     History of Present Illness     Chief Complaint:   Chief Complaint   Patient presents with   • Diabetes     med rfill - lab results  -    • Hypertension     needs prescription for leonila twist 32g test needles    • Hyperlipidemia     declines welcome to med phys today per pt        Junior Pollard Jr. 63 y.o. male who presents today for Medical Management of the below listed issues and medication refills.  he has a problem list of   Patient Active Problem List   Diagnosis   • Abnormal cardiovascular stress test   • Multiple actinic keratoses   • Chronic coronary artery disease   • Benign essential hypertension   • Chronic kidney disease, stage III (moderate) (CMS/HCC)   • Diabetes mellitus with renal complications (CMS/HCC)   • Hypercholesterolemia   • Elevated creatine kinase level   • Onychomycosis of toenail   • Osteoarthritis   • Proteinuria   • Type 2 diabetes with nephropathy (CMS/[facility])   .  Since the last visit, he has overall felt well.  he has been compliant with   Current Outpatient Medications:   •  atorvastatin (LIPITOR) 40 MG tablet, Take 1 tablet by mouth Daily., Disp: 90 tablet, Rfl: 1  •  glimepiride (AMARYL) 2 MG tablet, Take 1 tablet by mouth 2 (Two) Times a Day., Disp: 180 tablet, Rfl: 1  •  glucose blood (ACCU-CHEK COMPACT PLUS) test strip, 1 each by Other route Daily. Use as instructed, Disp: 100 each, Rfl: 3  •  [facility], AMB, KIT, Pharmacy Label,, Take  by mouth 1 (One) Time., Disp: , Rfl:   •  Liraglutide (VICTOZA) 18 MG/3ML solution pen-injector injection, Inject 1.8 mg under the skin into the appropriate area as directed Daily., Disp: 9 pen, Rfl: 1  •  lisinopril (PRINIVIL,ZESTRIL) 40 MG tablet, Take 1 tablet by mouth Daily., Disp: 90 tablet, Rfl: 1  •  pioglitazone (ACTOS) 30 MG tablet, Take 1 tablet by mouth Daily., Disp: 90 tablet, Rfl: 1  •  Insulin Pen Needle 32G X 5 MM misc, Use QD, Disp: 100 each, Rfl: 3.  he denies  "medication side effects.    All of the chronic condition(s) listed above are stable w/o issues.    /61   Pulse 68   Temp 98.8 °F (37.1 °C) (Oral)   Resp 16   Ht 182.9 cm (72\")   Wt 118 kg (261 lb)   BMI 35.40 kg/m²     Results for orders placed or performed in visit on 06/11/19   Basic Metabolic Panel   Result Value Ref Range    Glucose 125 (H) 65 - 99 mg/dL    BUN 29 (H) 8 - 23 mg/dL    Creatinine 1.65 (H) 0.76 - 1.27 mg/dL    eGFR Non African Am 42 (L) >60 mL/min/1.73    eGFR African Am 51 (L) >60 mL/min/1.73    BUN/Creatinine Ratio 17.6 7.0 - 25.0    Sodium 141 136 - 145 mmol/L    Potassium 4.7 3.5 - 5.2 mmol/L    Chloride 104 98 - 107 mmol/L    Total CO2 24.8 22.0 - 29.0 mmol/L    Calcium 9.4 8.6 - 10.5 mg/dL   Hemoglobin A1c   Result Value Ref Range    Hemoglobin A1C 7.10 (H) 4.80 - 5.60 %   Lipid Panel   Result Value Ref Range    Total Cholesterol 148 0 - 200 mg/dL    Triglycerides 116 0 - 150 mg/dL    HDL Cholesterol 29 (L) 40 - 60 mg/dL    VLDL Cholesterol 23.2 mg/dL    LDL Cholesterol  96 0 - 100 mg/dL           The following portions of the patient's history were reviewed and updated as appropriate: allergies, current medications, past family history, past medical history, past social history, past surgical history and problem list.    Review of Systems   Constitutional: Negative for activity change, chills, fatigue and fever.   Respiratory: Negative for cough and shortness of breath.    Cardiovascular: Negative for chest pain and palpitations.   Gastrointestinal: Negative for abdominal pain.   Endocrine: Negative for cold intolerance.   Psychiatric/Behavioral: Negative for behavioral problems and dysphoric mood. The patient is not nervous/anxious.        Objective   Physical Exam   Constitutional: He appears well-developed and well-nourished.   Neck: Neck supple. No thyromegaly present.   Cardiovascular: Normal rate and regular rhythm.   No murmur heard.  Pulmonary/Chest: Effort normal and " breath sounds normal.   Abdominal: Bowel sounds are normal. There is no tenderness.   Psychiatric: He has a normal mood and affect. His behavior is normal.   Nursing note and vitals reviewed.  Labs reviewed with pt today during visit. All questions answered.      Assessment/Plan   Junior was seen today for diabetes, hypertension and hyperlipidemia.    Diagnoses and all orders for this visit:    Welcome to Medicare preventive visit    Type 2 diabetes with nephropathy (CMS/[facility])  -     Liraglutide (VICTOZA) 18 MG/3ML solution pen-injector injection; Inject 1.8 mg under the skin into the appropriate area as directed Daily.  -     pioglitazone (ACTOS) 30 MG tablet; Take 1 tablet by mouth Daily.  -     Insulin Pen Needle 32G X 5 MM misc; Use QD  -     glimepiride (AMARYL) 2 MG tablet; Take 1 tablet by mouth 2 (Two) Times a Day.    Hypercholesterolemia  -     atorvastatin (LIPITOR) 40 MG tablet; Take 1 tablet by mouth Daily.    Benign essential hypertension  -     lisinopril (PRINIVIL,ZESTRIL) 40 MG tablet; Take 1 tablet by mouth Daily.

## 2019-07-11 NOTE — PATIENT INSTRUCTIONS
Medicare Wellness  Personal Prevention Plan of Service     Date of Office Visit:  2019  Encounter Provider:  Boogie Interiano MD  Place of Service:  [facility]  Patient Name: Junior Pollard Jr.  :  1955    As part of the Medicare Wellness portion of your visit today, we are providing you with this personalized preventive plan of services (PPPS). This plan is based upon recommendations of the [place] Preventive Services Task Force (USPSTF) and the Advisory Committee on Immunization Practices (ACIP).    This lists the preventive care services that should be considered, and provides dates of when you are due. Items listed as completed are up-to-date and do not require any further intervention.    Health Maintenance   Topic Date Due   • PNEUMOCOCCAL VACCINES (65+ LOW/MEDIUM RISK) (1 of 2 - PCV13) 2018   • MEDICARE ANNUAL WELLNESS  2019 (Originally 3/28/2016)   • DIABETIC FOOT EXAM  2019 (Originally 2018)   • ZOSTER VACCINE (2 of 2) 2020 (Originally 2014)   • INFLUENZA VACCINE  2019   • HEMOGLOBIN A1C  2020   • PROSTATE CANCER SCREENING  2020   • URINE MICROALBUMIN  2020   • DIABETIC EYE EXAM  2020   • LIPID PANEL  2020   • TDAP/TD VACCINES (3 - Td) 2028   • HEPATITIS C SCREENING  Discontinued   • COLONOSCOPY  Discontinued       No orders of the defined types were placed in this encounter.      Return in about 6 months (around 2020) for Recheck.

## 2019-12-03 ENCOUNTER — TELEPHONE (OUTPATIENT)
Dept: FAMILY MEDICINE CLINIC | Facility: CLINIC | Age: 64
End: 2019-12-03

## 2019-12-03 DIAGNOSIS — E11.21 TYPE 2 DIABETES MELLITUS WITH DIABETIC NEPHROPATHY, WITH LONG-TERM CURRENT USE OF INSULIN (HCC): Primary | ICD-10-CM

## 2019-12-03 DIAGNOSIS — E11.21 TYPE 2 DIABETES MELLITUS WITH DIABETIC NEPHROPATHY, WITH LONG-TERM CURRENT USE OF INSULIN (HCC): ICD-10-CM

## 2019-12-03 DIAGNOSIS — Z79.4 TYPE 2 DIABETES MELLITUS WITH DIABETIC NEPHROPATHY, WITH LONG-TERM CURRENT USE OF INSULIN (HCC): ICD-10-CM

## 2019-12-03 DIAGNOSIS — Z79.4 TYPE 2 DIABETES MELLITUS WITH DIABETIC NEPHROPATHY, WITH LONG-TERM CURRENT USE OF INSULIN (HCC): Primary | ICD-10-CM

## 2019-12-03 NOTE — TELEPHONE ENCOUNTER
PATIENT HAS LAB ORDER FROM SPECIALIST, AND DR. MCCAIN DOES NOT WANT TEST DUPLICATED. LOOKS LIKE WE ONLY NEED TO ORDER A LIPID AND A1C.    I WILL BRING YOU LAB ORDER FROM THE SPECIALIST.    THANKS RENEA

## 2019-12-24 LAB — HBA1C MFR BLD: 6.9 % (ref 4.8–5.6)

## 2020-01-15 ENCOUNTER — NURSE TRIAGE (OUTPATIENT)
Dept: CALL CENTER | Facility: HOSPITAL | Age: 65
End: 2020-01-15

## 2020-01-15 ENCOUNTER — OFFICE VISIT (OUTPATIENT)
Dept: FAMILY MEDICINE CLINIC | Facility: CLINIC | Age: 65
End: 2020-01-15

## 2020-01-15 ENCOUNTER — TELEPHONE (OUTPATIENT)
Dept: FAMILY MEDICINE CLINIC | Facility: CLINIC | Age: 65
End: 2020-01-15

## 2020-01-15 VITALS
TEMPERATURE: 98.1 F | HEIGHT: 72 IN | RESPIRATION RATE: 16 BRPM | DIASTOLIC BLOOD PRESSURE: 68 MMHG | BODY MASS INDEX: 36.44 KG/M2 | HEART RATE: 78 BPM | OXYGEN SATURATION: 97 % | WEIGHT: 269 LBS | SYSTOLIC BLOOD PRESSURE: 128 MMHG

## 2020-01-15 DIAGNOSIS — E11.21 TYPE 2 DIABETES WITH NEPHROPATHY (HCC): ICD-10-CM

## 2020-01-15 DIAGNOSIS — E78.00 HYPERCHOLESTEROLEMIA: ICD-10-CM

## 2020-01-15 DIAGNOSIS — E11.21 TYPE 2 DIABETES WITH NEPHROPATHY (HCC): Primary | ICD-10-CM

## 2020-01-15 DIAGNOSIS — I10 BENIGN ESSENTIAL HYPERTENSION: ICD-10-CM

## 2020-01-15 DIAGNOSIS — Z12.5 SCREENING FOR PROSTATE CANCER: ICD-10-CM

## 2020-01-15 PROCEDURE — 99214 OFFICE O/P EST MOD 30 MIN: CPT | Performed by: FAMILY MEDICINE

## 2020-01-15 RX ORDER — BLOOD-GLUCOSE METER
EACH MISCELLANEOUS
Qty: 1 KIT | Refills: 0 | Status: SHIPPED | OUTPATIENT
Start: 2020-01-15 | End: 2021-12-15 | Stop reason: SDUPTHER

## 2020-01-15 RX ORDER — LISINOPRIL 40 MG/1
40 TABLET ORAL DAILY
Qty: 90 TABLET | Refills: 1 | Status: SHIPPED | OUTPATIENT
Start: 2020-01-15 | End: 2020-07-22 | Stop reason: SDUPTHER

## 2020-01-15 RX ORDER — GLIMEPIRIDE 2 MG/1
2 TABLET ORAL
Qty: 180 TABLET | Refills: 1 | Status: SHIPPED | OUTPATIENT
Start: 2020-01-15 | End: 2020-07-22 | Stop reason: SDUPTHER

## 2020-01-15 RX ORDER — PIOGLITAZONEHYDROCHLORIDE 30 MG/1
30 TABLET ORAL DAILY
Qty: 90 TABLET | Refills: 1 | Status: SHIPPED | OUTPATIENT
Start: 2020-01-15 | End: 2020-07-22 | Stop reason: SDUPTHER

## 2020-01-15 RX ORDER — ATORVASTATIN CALCIUM 40 MG/1
40 TABLET, FILM COATED ORAL DAILY
Qty: 90 TABLET | Refills: 1 | Status: SHIPPED | OUTPATIENT
Start: 2020-01-15 | End: 2020-07-22 | Stop reason: SDUPTHER

## 2020-01-15 NOTE — PROGRESS NOTES
Subjective   Junior Pollard Jr. is a 64 y.o. male.     History of Present Illness     Chief Complaint:   Chief Complaint   Patient presents with   • Diabetes     med refill - lab results - meds  reviewed with pt today    • Hypertension     [facility]  - diabetic foot exam due    • Hyperlipidemia       Junior Pollard Jr. 64 y.o. male who presents today for Medical Management of the below listed issues and medication refills.  he has a problem list of   Patient Active Problem List   Diagnosis   • Abnormal cardiovascular stress test   • Multiple actinic keratoses   • Chronic coronary artery disease   • Benign essential hypertension   • Chronic kidney disease, stage III (moderate) (CMS/[facility])   • Diabetes mellitus with renal complications (CMS/[facility])   • Hypercholesterolemia   • Elevated creatine kinase level   • Onychomycosis of toenail   • Osteoarthritis   • Proteinuria   • Type 2 diabetes with nephropathy (CMS/[facility])   .  Since the last visit, he has overall felt well.  he has been compliant with   Current Outpatient Medications:   •  atorvastatin (LIPITOR) 40 MG tablet, Take 1 tablet by mouth Daily., Disp: 90 tablet, Rfl: 1  •  glimepiride (AMARYL) 2 MG tablet, Take 1 tablet by mouth 2 (Two) Times a Day., Disp: 180 tablet, Rfl: 1  •  Liraglutide (VICTOZA) 18 MG/3ML solution pen-injector injection, Inject 1.8 mg under the skin into the appropriate area as directed Daily., Disp: 9 pen, Rfl: 1  •  lisinopril (PRINIVIL,ZESTRIL) 40 MG tablet, Take 1 tablet by mouth Daily., Disp: 90 tablet, Rfl: 1  •  pioglitazone (ACTOS) 30 MG tablet, Take 1 tablet by mouth Daily., Disp: 90 tablet, Rfl: 1  •  glucose blood (ACCU-CHEK COMPACT PLUS) test strip, 1 each by Other route Daily. Use as instructed, Disp: 100 each, Rfl: 3  •  Insulin Pen Needle 32G X 5 MM misc, Use QD, Disp: 100 each, Rfl: 3  •  INV ALLIANCE, AMB, KIT, Pharmacy Label,, Take  by mouth 1 (One) Time., Disp: , Rfl: .  he denies medication side effects.    All of the  "other chronic condition(s) listed above are stable w/o issues.    /68   Pulse 78   Temp 98.1 °F (36.7 °C) (Oral)   Resp 16   Ht 182.9 cm (72\")   Wt 122 kg (269 lb)   SpO2 97%   BMI 36.48 kg/m²     Results for orders placed or performed in visit on 12/03/19   Hemoglobin A1c   Result Value Ref Range    Hemoglobin A1C 6.90 (H) 4.80 - 5.60 %           The following portions of the patient's history were reviewed and updated as appropriate: allergies, current medications, past family history, past medical history, past social history, past surgical history and problem list.    Review of Systems   Constitutional: Negative for activity change, chills, fatigue and fever.   Respiratory: Negative for cough and shortness of breath.    Cardiovascular: Negative for chest pain and palpitations.   Gastrointestinal: Negative for abdominal pain.   Endocrine: Negative for cold intolerance.   Psychiatric/Behavioral: Negative for behavioral problems and dysphoric mood. The patient is not nervous/anxious.        Objective   Physical Exam   Constitutional: He appears well-developed and well-nourished.   Neck: Neck supple. No thyromegaly present.   Cardiovascular: Normal rate and regular rhythm.   No murmur heard.  Pulmonary/Chest: Effort normal and breath sounds normal.   Abdominal: Bowel sounds are normal. There is no tenderness.    Junior had a diabetic foot exam performed today.   During the foot exam he had a monofilament test performed.    Neurological Sensory Findings - Unaltered hot/cold right ankle/foot discrimination and unaltered hot/cold left ankle/foot discrimination. Unaltered sharp/dull right ankle/foot discrimination and unaltered sharp/dull left ankle/foot discrimination. No right ankle/foot altered proprioception and no left ankle/foot altered proprioception  Vascular Status -  His right foot exhibits normal foot vasculature  and no edema. His left foot exhibits normal foot vasculature  and no edema.  Skin " Integrity  - His left foot skin is intact..  Psychiatric: He has a normal mood and affect. His behavior is normal.   Nursing note and vitals reviewed.  Labs reviewed with pt today during visit. All questions answered.      Assessment/Plan   Junior was seen today for diabetes, hypertension and hyperlipidemia.    Diagnoses and all orders for this visit:    Type 2 diabetes with nephropathy (CMS/HCC)  -     Liraglutide (VICTOZA) 18 MG/3ML solution pen-injector injection; Inject 1.8 mg under the skin into the appropriate area as directed Daily.  -     pioglitazone (ACTOS) 30 MG tablet; Take 1 tablet by mouth Daily.  -     glimepiride (AMARYL) 2 MG tablet; Take 1 tablet by mouth 2 (Two) Times a Day.  -     Comprehensive metabolic panel; Future  -     Lipid panel; Future  -     Hemoglobin A1c; Future  -     MicroAlbumin, Urine, Random - Urine, Clean Catch; Future    Benign essential hypertension  -     lisinopril (PRINIVIL,ZESTRIL) 40 MG tablet; Take 1 tablet by mouth Daily.  -     Lipid panel; Future  -     CBC and Differential; Future  -     TSH; Future    Hypercholesterolemia  -     atorvastatin (LIPITOR) 40 MG tablet; Take 1 tablet by mouth Daily.  -     Lipid panel; Future    Screening for prostate cancer  -     PSA; Future

## 2020-01-16 NOTE — TELEPHONE ENCOUNTER
"Will be speaking with provider    Reason for Disposition  • Information only question and nurse able to answer    Additional Information  • Negative: Nursing judgment  • Negative: Nursing judgment  • Negative: Nursing judgment  • Negative: Nursing judgment    Answer Assessment - Initial Assessment Questions  1. REASON FOR CALL or QUESTION: \"What is your reason for calling today?\" or \"How can I best help you?\" or \"What question do you have that I can help answer?\"      Concerned about something in My Chart and unable to answer    Protocols used: INFORMATION ONLY CALL - NO TRIAGE-ADULT-[place]      "

## 2020-03-22 DIAGNOSIS — E11.21 TYPE 2 DIABETES WITH NEPHROPATHY (HCC): ICD-10-CM

## 2020-03-23 RX ORDER — BLOOD SUGAR DIAGNOSTIC, DRUM
STRIP MISCELLANEOUS
Qty: 100 EACH | Refills: 2 | Status: SHIPPED | OUTPATIENT
Start: 2020-03-23 | End: 2020-07-22 | Stop reason: SDUPTHER

## 2020-06-15 ENCOUNTER — RESULTS ENCOUNTER (OUTPATIENT)
Dept: FAMILY MEDICINE CLINIC | Facility: CLINIC | Age: 65
End: 2020-06-15

## 2020-06-15 DIAGNOSIS — E11.21 TYPE 2 DIABETES WITH NEPHROPATHY (HCC): ICD-10-CM

## 2020-06-15 DIAGNOSIS — Z12.5 SCREENING FOR PROSTATE CANCER: ICD-10-CM

## 2020-06-15 DIAGNOSIS — E78.00 HYPERCHOLESTEROLEMIA: ICD-10-CM

## 2020-06-15 DIAGNOSIS — I10 BENIGN ESSENTIAL HYPERTENSION: ICD-10-CM

## 2020-07-14 LAB
CHOLEST SERPL-MCNC: 148 MG/DL (ref 0–200)
HBA1C MFR BLD: 6.3 % (ref 4.8–5.6)
HDLC SERPL-MCNC: 25 MG/DL (ref 40–60)
LDLC SERPL CALC-MCNC: 89 MG/DL (ref 0–100)
MICROALBUMIN UR-MCNC: 9.9 UG/ML
PSA SERPL-MCNC: 0.62 NG/ML (ref 0–4)
TRIGL SERPL-MCNC: 170 MG/DL (ref 0–150)
TSH SERPL DL<=0.005 MIU/L-ACNC: 1.57 UIU/ML (ref 0.27–4.2)
VLDLC SERPL CALC-MCNC: 34 MG/DL

## 2020-07-22 ENCOUNTER — OFFICE VISIT (OUTPATIENT)
Dept: FAMILY MEDICINE CLINIC | Facility: CLINIC | Age: 65
End: 2020-07-22

## 2020-07-22 VITALS
RESPIRATION RATE: 16 BRPM | WEIGHT: 271.8 LBS | SYSTOLIC BLOOD PRESSURE: 122 MMHG | HEIGHT: 72 IN | HEART RATE: 78 BPM | BODY MASS INDEX: 36.82 KG/M2 | TEMPERATURE: 98.4 F | DIASTOLIC BLOOD PRESSURE: 62 MMHG

## 2020-07-22 DIAGNOSIS — E11.21 TYPE 2 DIABETES WITH NEPHROPATHY (HCC): Primary | ICD-10-CM

## 2020-07-22 DIAGNOSIS — I10 BENIGN ESSENTIAL HYPERTENSION: ICD-10-CM

## 2020-07-22 DIAGNOSIS — E78.00 HYPERCHOLESTEROLEMIA: ICD-10-CM

## 2020-07-22 PROCEDURE — 99214 OFFICE O/P EST MOD 30 MIN: CPT | Performed by: FAMILY MEDICINE

## 2020-07-22 RX ORDER — PIOGLITAZONEHYDROCHLORIDE 30 MG/1
30 TABLET ORAL DAILY
Qty: 90 TABLET | Refills: 1 | Status: SHIPPED | OUTPATIENT
Start: 2020-07-22 | End: 2021-01-19 | Stop reason: SDUPTHER

## 2020-07-22 RX ORDER — GLIMEPIRIDE 2 MG/1
2 TABLET ORAL
Qty: 180 TABLET | Refills: 1 | Status: SHIPPED | OUTPATIENT
Start: 2020-07-22 | End: 2021-01-19 | Stop reason: SDUPTHER

## 2020-07-22 RX ORDER — LISINOPRIL 40 MG/1
40 TABLET ORAL DAILY
Qty: 90 TABLET | Refills: 1 | Status: SHIPPED | OUTPATIENT
Start: 2020-07-22 | End: 2021-01-19 | Stop reason: SDUPTHER

## 2020-07-22 RX ORDER — ATORVASTATIN CALCIUM 40 MG/1
40 TABLET, FILM COATED ORAL DAILY
Qty: 90 TABLET | Refills: 1 | Status: SHIPPED | OUTPATIENT
Start: 2020-07-22 | End: 2021-01-19 | Stop reason: SDUPTHER

## 2020-07-22 NOTE — PROGRESS NOTES
Subjective   Junior Pollard Jr. is a 64 y.o. male.     History of Present Illness     Chief Complaint:   Chief Complaint   Patient presents with   • Diabetes     pt declines med wellness today per pt    • Hypertension     med refill - lab results - meds reviewed with pt today   • Hyperlipidemia     pt requesting lancets and test strips today        Junior Pollard Jr. 64 y.o. male who presents today for Medical Management of the below listed issues and medication refills.  he has a problem list of   Patient Active Problem List   Diagnosis   • Abnormal cardiovascular stress test   • Multiple actinic keratoses   • Chronic coronary artery disease   • Benign essential hypertension   • Chronic kidney disease, stage III (moderate) (CMS/[facility])   • Diabetes mellitus with renal complications (CMS/[facility])   • Hypercholesterolemia   • Elevated creatine kinase level   • Onychomycosis of toenail   • Osteoarthritis   • Proteinuria   • Type 2 diabetes with nephropathy (CMS/[facility])   .  Since the last visit, he has overall felt well.  he has been compliant with   Current Outpatient Medications:   •  atorvastatin (Lipitor) 40 MG tablet, Take 1 tablet by mouth Daily., Disp: 90 tablet, Rfl: 1  •  glimepiride (Amaryl) 2 MG tablet, Take 1 tablet by mouth 2 (Two) Times a Day., Disp: 180 tablet, Rfl: 1  •  glucose blood (Accu-Chek Compact Plus) test strip, 1 each by Other route Daily., Disp: 100 each, Rfl: 3  •  Insulin Pen Needle 32G X 5 MM misc, Use QD, Disp: 100 each, Rfl: 3  •  Lancets (ACCU-CHEK SOFT TOUCH) lancets, Use as directed to test once daily E11.9, Disp: 100 each, Rfl: 3  •  Liraglutide (Victoza) 18 MG/3ML solution pen-injector injection, Inject 1.8 mg under the skin into the appropriate area as directed Daily., Disp: 9 pen, Rfl: 1  •  lisinopril (PRINIVIL,ZESTRIL) 40 MG tablet, Take 1 tablet by mouth Daily., Disp: 90 tablet, Rfl: 1  •  pioglitazone (ACTOS) 30 MG tablet, Take 1 tablet by mouth Daily., Disp: 90 tablet, Rfl: 1  •  " Blood Glucose Monitoring Suppl (ACCU-CHEK MELECIO PLUS) w/Device kit, Use as directed to check BS daily E11.9, Disp: 1 kit, Rfl: 0.  he denies medication side effects.    All of the other chronic condition(s) listed above are stable w/o issues.    /62   Pulse 78   Temp 98.4 °F (36.9 °C) (Oral)   Resp 16   Ht 182.9 cm (72\")   Wt 123 kg (271 lb 12.8 oz)   BMI 36.86 kg/m²     Results for orders placed or performed in visit on 06/15/20   Lipid panel   Result Value Ref Range    Total Cholesterol 148 0 - 200 mg/dL    Triglycerides 170 (H) 0 - 150 mg/dL    HDL Cholesterol 25 (L) 40 - 60 mg/dL    VLDL Cholesterol 34 mg/dL    LDL Cholesterol  89 0 - 100 mg/dL   TSH   Result Value Ref Range    TSH 1.570 0.270 - 4.200 uIU/mL   PSA   Result Value Ref Range    PSA 0.624 0.000 - 4.000 ng/mL   Hemoglobin A1c   Result Value Ref Range    Hemoglobin A1C 6.30 (H) 4.80 - 5.60 %   MicroAlbumin, Urine, Random - Urine, Clean Catch   Result Value Ref Range    Microalbumin, Urine 9.9 Not Estab. ug/mL           The following portions of the patient's history were reviewed and updated as appropriate: allergies, current medications, past family history, past medical history, past social history, past surgical history and problem list.    Review of Systems   Constitutional: Negative for activity change, chills, fatigue and fever.   Respiratory: Negative for cough and shortness of breath.    Cardiovascular: Negative for chest pain and palpitations.   Gastrointestinal: Negative for abdominal pain.   Endocrine: Negative for cold intolerance.   Psychiatric/Behavioral: Negative for behavioral problems and dysphoric mood. The patient is not nervous/anxious.        Objective   Physical Exam   Constitutional: He appears well-developed and well-nourished.   Neck: Neck supple. No thyromegaly present.   Cardiovascular: Normal rate and regular rhythm.   No murmur heard.  Pulmonary/Chest: Effort normal and breath sounds normal.   Abdominal: Bowel " sounds are normal. There is no tenderness.   Psychiatric: He has a normal mood and affect. His behavior is normal.   Nursing note and vitals reviewed.  Labs reviewed with pt today during visit. All questions answered.      Assessment/Plan   Junior was seen today for diabetes, hypertension and hyperlipidemia.    Diagnoses and all orders for this visit:    Type 2 diabetes with nephropathy (CMS/HCC)  -     Liraglutide (Victoza) 18 MG/3ML solution pen-injector injection; Inject 1.8 mg under the skin into the appropriate area as directed Daily.  -     pioglitazone (ACTOS) 30 MG tablet; Take 1 tablet by mouth Daily.  -     glimepiride (Amaryl) 2 MG tablet; Take 1 tablet by mouth 2 (Two) Times a Day.  -     Lancets (ACCU-CHEK SOFT TOUCH) lancets; Use as directed to test once daily E11.9  -     Insulin Pen Needle 32G X 5 MM misc; Use QD  -     glucose blood (Accu-Chek Compact Plus) test strip; 1 each by Other route Daily.  -     Basic Metabolic Panel; Future  -     Hemoglobin A1c; Future  -     Lipid Panel; Future  -     CBC & Differential; Future    Benign essential hypertension  -     lisinopril (PRINIVIL,ZESTRIL) 40 MG tablet; Take 1 tablet by mouth Daily.    Hypercholesterolemia  -     atorvastatin (Lipitor) 40 MG tablet; Take 1 tablet by mouth Daily.

## 2020-10-04 ENCOUNTER — HOSPITAL ENCOUNTER (EMERGENCY)
Facility: HOSPITAL | Age: 65
Discharge: HOME | End: 2020-10-04
Attending: EMERGENCY MEDICINE | Admitting: EMERGENCY MEDICINE

## 2020-10-04 VITALS
HEART RATE: 65 BPM | TEMPERATURE: 97 F | DIASTOLIC BLOOD PRESSURE: 70 MMHG | RESPIRATION RATE: 18 BRPM | SYSTOLIC BLOOD PRESSURE: 140 MMHG | OXYGEN SATURATION: 100 %

## 2020-10-04 DIAGNOSIS — S39.012A ACUTE MYOFASCIAL STRAIN OF LUMBAR REGION, INITIAL ENCOUNTER: Primary | ICD-10-CM

## 2020-10-04 PROCEDURE — 99284 EMERGENCY DEPT VISIT MOD MDM: CPT

## 2020-10-04 PROCEDURE — 96374 THER/PROPH/DIAG INJ IV PUSH: CPT

## 2020-10-04 PROCEDURE — 96375 TX/PRO/DX INJ NEW DRUG ADDON: CPT

## 2020-10-04 PROCEDURE — 25010000002 KETOROLAC TROMETHAMINE PER 15 MG: Performed by: EMERGENCY MEDICINE

## 2020-10-04 PROCEDURE — 25010000002 HYDROMORPHONE PER 4 MG: Performed by: EMERGENCY MEDICINE

## 2020-10-04 RX ORDER — HYDROMORPHONE HYDROCHLORIDE 1 MG/ML
0.5 INJECTION, SOLUTION INTRAMUSCULAR; INTRAVENOUS; SUBCUTANEOUS ONCE
Status: COMPLETED | OUTPATIENT
Start: 2020-10-04 | End: 2020-10-04

## 2020-10-04 RX ORDER — HYDROCODONE BITARTRATE AND ACETAMINOPHEN 5; 325 MG/1; MG/1
1 TABLET ORAL EVERY 6 HOURS PRN
Qty: 12 TABLET | Refills: 0 | Status: SHIPPED | OUTPATIENT
Start: 2020-10-04 | End: 2021-06-23

## 2020-10-04 RX ORDER — METAXALONE 800 MG/1
800 TABLET ORAL 3 TIMES DAILY
Qty: 15 TABLET | Refills: 0 | Status: SHIPPED | OUTPATIENT
Start: 2020-10-04 | End: 2020-10-08 | Stop reason: SDUPTHER

## 2020-10-04 RX ORDER — NAPROXEN 375 MG/1
375 TABLET ORAL 2 TIMES DAILY PRN
Qty: 20 TABLET | Refills: 0 | Status: SHIPPED | OUTPATIENT
Start: 2020-10-04 | End: 2020-10-08 | Stop reason: SDUPTHER

## 2020-10-04 RX ORDER — BACLOFEN 10 MG/1
10 TABLET ORAL ONCE
Status: COMPLETED | OUTPATIENT
Start: 2020-10-04 | End: 2020-10-04

## 2020-10-04 RX ORDER — KETOROLAC TROMETHAMINE 15 MG/ML
10 INJECTION, SOLUTION INTRAMUSCULAR; INTRAVENOUS ONCE
Status: COMPLETED | OUTPATIENT
Start: 2020-10-04 | End: 2020-10-04

## 2020-10-04 RX ORDER — HYDROCODONE BITARTRATE AND ACETAMINOPHEN 7.5; 325 MG/1; MG/1
1 TABLET ORAL ONCE
Status: COMPLETED | OUTPATIENT
Start: 2020-10-04 | End: 2020-10-04

## 2020-10-04 RX ADMIN — HYDROCODONE BITARTRATE AND ACETAMINOPHEN 1 TABLET: 7.5; 325 TABLET ORAL at 19:01

## 2020-10-04 RX ADMIN — HYDROMORPHONE HYDROCHLORIDE 0.5 MG: 1 INJECTION, SOLUTION INTRAMUSCULAR; INTRAVENOUS; SUBCUTANEOUS at 17:48

## 2020-10-04 RX ADMIN — BACLOFEN 10 MG: 10 TABLET ORAL at 17:48

## 2020-10-04 RX ADMIN — KETOROLAC TROMETHAMINE 10 MG: 15 INJECTION, SOLUTION INTRAMUSCULAR; INTRAVENOUS at 17:48

## 2020-10-04 NOTE — ED NOTES
Patient was wearing facemask when RN entered the room and throughout our encounter. RN wearing PPE throughout all patient encounter including a face mask, goggles and gloves.      Jackie Hernandez RN  10/04/20 [id number]

## 2020-10-04 NOTE — ED PROVIDER NOTES
EMERGENCY DEPARTMENT ENCOUNTER    Room Number:  25/25  Date of encounter:  10/4/2020  PCP: Boogie Interiano MD  Historian: Patient     I used full protective equipment while examining this patient.  This includes face mask, gloves and protective eyewear.  I washed my hands before entering the room and immediately upon leaving the room.  Patient was wearing a surgical mask.      HPI:  Chief Complaint: Right lower back pain  A complete HPI/ROS/PMH/PSH/SH/FH are unobtainable due to: None    Context: Junior Pollard Jr. is a 64 y.o. male who presents to the ED c/o right lower back pain for the past 1 month.   Pain is described as dull and burning.  It does not radiate.  Pain is worse with moving and bending.  Pain is currently severe.  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  Patient states that he had to slide and turn the statue   Since then, the pain has been constant but is waxing and waning.  Pain worsened yesterday.  He denies any other recent injury.  Also denies numbness/tingling/weakness in his legs, bowel/bladder problems, saddle anesthesia, fever, chills, chest pain, shortness of breath, dysuria, or abdominal pain.  He also denies any prior history of back surgeries.      PAST MEDICAL HISTORY  Active Ambulatory Problems     Diagnosis Date Noted   • Abnormal cardiovascular stress test 03/28/2016   • Multiple actinic keratoses 03/28/2016   • Chronic coronary artery disease 03/28/2016   • Benign essential hypertension 03/28/2016   • Chronic kidney disease, stage III (moderate) 03/28/2016   • Diabetes mellitus with renal complications (CMS/[facility]) 03/28/2016   • Hypercholesterolemia 03/28/2016   • Elevated creatine kinase level 03/28/2016   • Onychomycosis of toenail 03/28/2016   • Osteoarthritis 03/28/2016   • Proteinuria 03/28/2016   • Type 2 diabetes with nephropathy (CMS/[facility]) 03/28/2016     Resolved Ambulatory Problems     Diagnosis Date Noted   • No  Resolved Ambulatory Problems     Past Medical History:   Diagnosis Date   • Arthritis    • Diabetes mellitus (CMS/[facility])    • Diabetic eye exam (CMS/[facility]) 01/24/2017   • Hyperlipidemia    • Hypertension          PAST SURGICAL HISTORY  Past Surgical History:   Procedure Laterality Date   • FINGER SURGERY Right     Fourth Digit.         FAMILY HISTORY  Family History   Problem Relation Age of Onset   • Heart disease Mother    • Diabetes Mother    • Heart disease Father    • Hypertension Father    • Diabetes Father          SOCIAL HISTORY  Social History     Socioeconomic History   • Marital status:      Spouse name: Not on file   • Number of children: Not on file   • Years of education: Not on file   • Highest education level: Not on file   Tobacco Use   • Smoking status: Never Smoker   • Smokeless tobacco: Never Used   Substance and Sexual Activity   • Alcohol use: No         ALLERGIES  Patient has no known allergies.       REVIEW OF SYSTEMS  Review of Systems      All systems have been reviewed and are negative except as as discussed in the HPI    PHYSICAL EXAM    I have reviewed the triage vital signs and nursing notes.    ED Triage Vitals   Temp Heart Rate Resp BP SpO2   10/04/20 1531 10/04/20 1531 10/04/20 1530 10/04/20 1530 10/04/20 1531   97 °F (36.1 °C) 72 18 150/80 100 %      Temp src Heart Rate Source Patient Position BP Location FiO2 (%)   10/04/20 1531 -- -- -- --   Tympanic           Physical Exam  GENERAL: Awake, alert, oriented x3  HENT: NCAT, nares patent, moist mucous membranes  NECK: supple, no lymphadenopathy  EYES: no scleral icterus  CV: regular rhythm, regular rate, no murmur, equal pedal pulses bilaterally  RESPIRATORY: normal effort, clear to auscultation bilaterally  ABDOMEN: soft, nontender, nondistended  MUSCULOSKELETAL: There is tenderness and spasm of the right lumbar paraspinal muscles.  No vertebral tenderness of the thoracic or lumbar spine.  Extremities are nontender and without  obvious deformity.  There is normal range of motion in all extremities.  There is no calf tenderness or pedal edema  NEURO: Normal strength and light touch sensation in both lower extremities.  No saddle anesthesia.  EHL 5/5 bilaterally.  Straight leg raises are negative bilaterally.  DTRs are symmetrical and normal bilaterally.  SKIN: warm, dry, no rash  PSYCH: Normal mood and affect      LAB RESULTS  No results found for this or any previous visit (from the past 24 hour(s)).    Ordered the above labs and independently reviewed the results.      RADIOLOGY  No Radiology Exams Resulted Within Past 24 Hours    I ordered the above noted radiological studies. Reviewed by me and discussed with radiologist.  See dictation for official radiology interpretation.      PROCEDURES  Procedures      MEDICATIONS GIVEN IN ER    Medications   HYDROmorphone (DILAUDID) injection 0.5 mg (0.5 mg Intravenous Given 10/4/20 1748)   ketorolac (TORADOL) injection 10 mg (10 mg Intravenous Given 10/4/20 1748)   baclofen (LIORESAL) tablet 10 mg (10 mg Oral Given 10/4/20 1748)   HYDROcodone-acetaminophen (NORCO) 7.5-325 MG per tablet 1 tablet (1 tablet Oral Given 10/4/20 1901)         PROGRESS, DATA ANALYSIS, CONSULTS, AND MEDICAL DECISION MAKING    All labs have been independently reviewed by me.  All radiology studies have been reviewed by me and discussed with radiologist dictating the report.   EKG's independently viewed and interpreted by me.  I have reviewed the nurse's notes, vital signs, past medical history, and medication list.  Discussion below represents my analysis of pertinent findings related to patient's condition, differential diagnosis, treatment plan and final disposition.      ED Course as of Oct 04 1922   Sun Oct 04, 2020   1852 Patient states he is feeling better.  He has not been able to give a urine specimen.  He is not having any dysuria, urgency, or frequency.  UA will be canceled.  Patient will be discharged with  prescriptions for Skelaxin, Norco, and naproxen.    [WH]   1852 THOMAS report was not available.  I believe the medical necessity for and safety in prescribing the controlled substance substantially outweighs the risk of unlawful use or diversion of the controlled substance.      [WH]   1921 Patient complained of right lower back pain.  His symptoms were not suggestive of lumbar radiculopathy or cauda equina syndrome.  His pain improved with IV medications.  He had a normal neuro exam.  He did not have any red flag signs or symptoms.    [WH]      ED Course User Index  [WH] Hunter Moran MD       AS OF 19:22 EDT VITALS:    BP - 140/70  HR - 65  TEMP - 97 °F (36.1 °C) (Tympanic)  O2 SATS - 100%      DIAGNOSIS  Final diagnoses:   Acute myofascial strain of lumbar region, initial encounter         DISPOSITION  Discharge    DISCHARGE    Patient discharged in stable condition.    Reviewed implications of results, diagnosis, meds, responsibility to follow up, warning signs and symptoms of possible worsening, potential complications and reasons to return to ER, including worsening pain, numbness/tingling/weakness in legs, bowel/bladder problems, saddle anesthesia, or other concern.    Patient/Family voiced understanding of above instructions.    Discussed plan for discharge, as there is no emergent indication for admission. Patient referred to primary care provider for BP management due to today's BP. Pt/family is agreeable and understands need for follow up and repeat testing.  Pt is aware that discharge does not mean that nothing is wrong but it indicates no emergency is present that requires admission and they must continue care with follow-up as given below or physician of their choice.     FOLLOW-UP  Boogie Interiano MD  [id number] [id number] [phone]    Call in 2 days  If symptoms persist         Medication List      New Prescriptions    HYDROcodone-acetaminophen 5-325 MG per  tablet  Commonly known as: NORCO  Take 1 tablet by mouth Every 6 (Six) Hours As Needed for Moderate Pain .     metaxalone 800 MG tablet  Commonly known as: SKELAXIN  Take 1 tablet by mouth 3 (Three) Times a Day.     naproxen 375 MG tablet  Commonly known as: NAPROSYN  Take 1 tablet by mouth 2 (Two) Times a Day As Needed for Moderate Pain .           Where to Get Your Medications      You can get these medications from any pharmacy    Bring a paper prescription for each of these medications  · HYDROcodone-acetaminophen 5-325 MG per tablet  · metaxalone 800 MG tablet  · naproxen 375 MG tablet           Dictated utilizing Dragon dictation:  Much of this encounter note is an electronic transcription/translation of spoken language to printed text. The electronic translation of spoken language may permit erroneous, or at times, nonsensical words or phrases to be inadvertently transcribed; Although I have reviewed the note for such errors, some may still exist.     Hunter Moran MD  10/04/20 1922

## 2020-10-04 NOTE — ED TRIAGE NOTES
Pt brought to ER by Kaelyn, reports right sided back pain x 1 month states worse 2 days ago after moving heavy objects. Pt from home. Mask placed on patient in first look triage. Triage staff wearing masks.

## 2020-10-04 NOTE — DISCHARGE INSTRUCTIONS
Take medications as prescribed.  Apply heat to affected area as needed.  Follow-up with your primary care doctor in the next several days if symptoms persist.  Return to emerge department for worsening pain, fever, chills, numbness/tingling/weakness in your legs, bowel/bladder problems, numbness in your groin/pelvic area, or other concern.

## 2020-10-08 ENCOUNTER — TELEPHONE (OUTPATIENT)
Dept: FAMILY MEDICINE CLINIC | Facility: CLINIC | Age: 65
End: 2020-10-08

## 2020-10-08 RX ORDER — NAPROXEN 375 MG/1
375 TABLET ORAL 2 TIMES DAILY PRN
Qty: 30 TABLET | Refills: 0 | Status: SHIPPED | OUTPATIENT
Start: 2020-10-08 | End: 2021-01-19

## 2020-10-08 RX ORDER — METAXALONE 800 MG/1
800 TABLET ORAL 3 TIMES DAILY
Qty: 45 TABLET | Refills: 0 | Status: SHIPPED | OUTPATIENT
Start: 2020-10-08 | End: 2021-06-23

## 2020-10-08 NOTE — TELEPHONE ENCOUNTER
Pt has hosp f/u scheduled on Tues, but will be out of Naproxen, and Skelaxin.  Wants to know if you will send a short supply to pharmacy to get him through the weekend.

## 2020-10-13 ENCOUNTER — TELEMEDICINE (OUTPATIENT)
Dept: FAMILY MEDICINE CLINIC | Facility: CLINIC | Age: 65
End: 2020-10-13

## 2020-10-13 DIAGNOSIS — S39.012D ACUTE MYOFASCIAL STRAIN OF LUMBAR REGION, SUBSEQUENT ENCOUNTER: Primary | ICD-10-CM

## 2020-10-13 DIAGNOSIS — Z09 HOSPITAL DISCHARGE FOLLOW-UP: ICD-10-CM

## 2020-10-13 RX ORDER — ASPIRIN 81 MG/1
81 TABLET ORAL DAILY
COMMUNITY

## 2020-10-13 RX ORDER — METRONIDAZOLE 500 MG/1
500 TABLET ORAL 3 TIMES DAILY
COMMUNITY
Start: 2020-10-09 | End: 2020-10-19

## 2020-10-13 RX ORDER — CIPROFLOXACIN 500 MG/1
500 TABLET, FILM COATED ORAL
COMMUNITY
Start: 2020-10-09 | End: 2020-10-19

## 2020-10-13 NOTE — PROGRESS NOTES
Subjective   Junior Pollard Jr. is a 64 y.o. male.     CC: Video Visit for ED F/U for Back Pain    History of Present Illness     Pt seen today on VV after trip to the [facility] ED on 10/4/20. That visit was as follows:    HPI:  Chief Complaint: Right lower back pain  A complete HPI/ROS/PMH/PSH/SH/FH are unobtainable due to: None     Context: Junior Pollard Jr. is a 64 y.o. male who presents to the ED c/o right lower back pain for the past 1 month.   Pain is described as dull and burning.  It does not radiate.  Pain is worse with moving and bending.  Pain is currently severe.  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  Patient states that he had to slide and turn the statue   Since then, the pain has been constant but is waxing and waning.  Pain worsened yesterday.  He denies any other recent injury.  Also denies numbness/tingling/weakness in his legs, bowel/bladder problems, saddle anesthesia, fever, chills, chest pain, shortness of breath, dysuria, or abdominal pain.  He also denies any prior history of back surgeries.    MEDICATIONS GIVEN IN ER     Medications   HYDROmorphone (DILAUDID) injection 0.5 mg (0.5 mg Intravenous Given 10/4/20 1748)   ketorolac (TORADOL) injection 10 mg (10 mg Intravenous Given 10/4/20 1748)   baclofen (LIORESAL) tablet 10 mg (10 mg Oral Given 10/4/20 1748)   HYDROcodone-acetaminophen (NORCO) 7.5-325 MG per tablet 1 tablet (1 tablet Oral Given 10/4/20 1901)     1852 Patient states he is feeling better.  He has not been able to give a urine specimen.  He is not having any dysuria, urgency, or frequency.  UA will be canceled.  Patient will be discharged with prescriptions for Skelaxin, Norco, and naproxen.      DIAGNOSIS  Final diagnoses:   Acute myofascial strain of lumbar region, initial encounter     New Prescriptions    HYDROcodone-acetaminophen 5-325 MG per tablet  Commonly known as: NORCO  Take 1 tablet by mouth Every 6  (Six) Hours As Needed for Moderate Pain .      metaxalone 800 MG tablet  Commonly known as: SKELAXIN  Take 1 tablet by mouth 3 (Three) Times a Day.      naproxen 375 MG tablet  Commonly known as: NAPROSYN  Take 1 tablet by mouth 2 (Two) Times a Day As Needed for Moderate Pain .     Current outpatient and discharge medications have been reconciled for the patient.  Reviewed by: Boogie Interiano MD      The following portions of the patient's history were reviewed and updated as appropriate: allergies, current medications, past family history, past medical history, past social history, past surgical history and problem list.    Review of Systems   Constitutional: Negative for activity change, chills and fever.   Respiratory: Negative for cough.    Cardiovascular: Negative for chest pain.   Psychiatric/Behavioral: Negative for dysphoric mood.       Objective   Physical Exam  Constitutional:       General: He is not in acute distress.     Appearance: He is well-developed.   Pulmonary:      Effort: Pulmonary effort is normal.   Neurological:      Mental Status: He is alert and oriented to person, place, and time.   Psychiatric:         Behavior: Behavior normal.         Thought Content: Thought content normal.     Hospital records reviewed with pt confirming HPI.      Assessment/Plan   Junior was seen today for lumbar strain.    Diagnoses and all orders for this visit:    Acute myofascial strain of lumbar region, subsequent encounter    Hospital discharge follow-up    Spent  18   minutes with chart and interview and consent for this encounter given by the patient.  You have chosen to receive care through a telehealth visit.  Do you consent to use a video/audio connection for your medical care today? Yes

## 2020-10-15 DIAGNOSIS — E11.21 TYPE 2 DIABETES WITH NEPHROPATHY (HCC): ICD-10-CM

## 2020-10-15 RX ORDER — BLOOD SUGAR DIAGNOSTIC
STRIP MISCELLANEOUS
Qty: 100 EACH | Refills: 1 | Status: SHIPPED | OUTPATIENT
Start: 2020-10-15 | End: 2021-01-20 | Stop reason: SDUPTHER

## 2020-12-22 ENCOUNTER — RESULTS ENCOUNTER (OUTPATIENT)
Dept: FAMILY MEDICINE CLINIC | Facility: CLINIC | Age: 65
End: 2020-12-22

## 2020-12-22 DIAGNOSIS — E11.21 TYPE 2 DIABETES WITH NEPHROPATHY (HCC): ICD-10-CM

## 2021-01-07 LAB
BASOPHILS # BLD AUTO: 0.09 10*3/MM3 (ref 0–0.2)
BASOPHILS NFR BLD AUTO: 1 % (ref 0–1.5)
BUN SERPL-MCNC: 36 MG/DL (ref 8–23)
BUN/CREAT SERPL: 17.6 (ref 7–25)
CALCIUM SERPL-MCNC: 10 MG/DL (ref 8.6–10.5)
CHLORIDE SERPL-SCNC: 102 MMOL/L (ref 98–107)
CHOLEST SERPL-MCNC: 195 MG/DL (ref 0–200)
CO2 SERPL-SCNC: 24.9 MMOL/L (ref 22–29)
CREAT SERPL-MCNC: 2.04 MG/DL (ref 0.76–1.27)
EOSINOPHIL # BLD AUTO: 0.15 10*3/MM3 (ref 0–0.4)
EOSINOPHIL NFR BLD AUTO: 1.6 % (ref 0.3–6.2)
ERYTHROCYTE [DISTWIDTH] IN BLOOD BY AUTOMATED COUNT: 12.2 % (ref 12.3–15.4)
GLUCOSE SERPL-MCNC: 127 MG/DL (ref 65–99)
HBA1C MFR BLD: 6.6 % (ref 4.8–5.6)
HCT VFR BLD AUTO: 43.7 % (ref 37.5–51)
HDLC SERPL-MCNC: 32 MG/DL (ref 40–60)
HGB BLD-MCNC: 14.5 G/DL (ref 13–17.7)
IMM GRANULOCYTES # BLD AUTO: 0.04 10*3/MM3 (ref 0–0.05)
IMM GRANULOCYTES NFR BLD AUTO: 0.4 % (ref 0–0.5)
LDLC SERPL CALC-MCNC: 134 MG/DL (ref 0–100)
LYMPHOCYTES # BLD AUTO: 2.63 10*3/MM3 (ref 0.7–3.1)
LYMPHOCYTES NFR BLD AUTO: 28.7 % (ref 19.6–45.3)
MCH RBC QN AUTO: 30.6 PG (ref 26.6–33)
MCHC RBC AUTO-ENTMCNC: 33.2 G/DL (ref 31.5–35.7)
MCV RBC AUTO: 92.2 FL (ref 79–97)
MONOCYTES # BLD AUTO: 0.58 10*3/MM3 (ref 0.1–0.9)
MONOCYTES NFR BLD AUTO: 6.3 % (ref 5–12)
NEUTROPHILS # BLD AUTO: 5.68 10*3/MM3 (ref 1.7–7)
NEUTROPHILS NFR BLD AUTO: 62 % (ref 42.7–76)
NRBC BLD AUTO-RTO: 0 /100 WBC (ref 0–0.2)
PLATELET # BLD AUTO: 278 10*3/MM3 (ref 140–450)
POTASSIUM SERPL-SCNC: 4.2 MMOL/L (ref 3.5–5.2)
RBC # BLD AUTO: 4.74 10*6/MM3 (ref 4.14–5.8)
SODIUM SERPL-SCNC: 139 MMOL/L (ref 136–145)
TRIGL SERPL-MCNC: 159 MG/DL (ref 0–150)
VLDLC SERPL CALC-MCNC: 29 MG/DL (ref 5–40)
WBC # BLD AUTO: 9.17 10*3/MM3 (ref 3.4–10.8)

## 2021-01-19 PROBLEM — E11.65 UNCONTROLLED TYPE 2 DIABETES MELLITUS WITH HYPERGLYCEMIA: Status: ACTIVE | Noted: 2020-10-09

## 2021-01-19 PROBLEM — R10.30 LOWER ABDOMINAL PAIN: Status: ACTIVE | Noted: 2020-10-09

## 2021-01-19 PROBLEM — R50.9 FEVER: Status: ACTIVE | Noted: 2020-10-09

## 2021-01-19 PROBLEM — R19.7 DIARRHEA: Status: ACTIVE | Noted: 2020-10-09

## 2021-01-19 PROBLEM — K57.92 ACUTE DIVERTICULITIS: Status: ACTIVE | Noted: 2020-10-09

## 2021-01-19 NOTE — PROGRESS NOTES
Subjective   Junior Pollard Jr. is a 65 y.o. male.     History of Present Illness     Chief Complaint:   Chief Complaint   Patient presents with   • Diabetes     MED WELLNESS NOT DUE  7/2021    • Hypertension     med refill - lab results. meds reviewed with pt today    • Hyperlipidemia     [facility]        Junior Pollard Jr. 65 y.o. male who presents today for Medical Management of the below listed issues and medication refills.  he has a problem list of   Patient Active Problem List   Diagnosis   • Abnormal cardiovascular stress test   • Multiple actinic keratoses   • Chronic coronary artery disease   • Benign essential hypertension   • Chronic kidney disease, stage III (moderate)   • Diabetes mellitus with renal complications (CMS/HCC)   • Hypercholesterolemia   • Elevated creatine kinase level   • Onychomycosis of toenail   • Osteoarthritis   • Proteinuria   • Type 2 diabetes with nephropathy (CMS/HCC)   • Acute diverticulitis   • Diarrhea   • Fever   • Lower abdominal pain   • Uncontrolled type 2 diabetes mellitus with hyperglycemia (CMS/HCC)   .  Since the last visit, he has overall felt well.  he has been compliant with   Current Outpatient Medications:   •  aspirin (aspirin) 81 MG EC tablet, Take 81 mg by mouth Daily., Disp: , Rfl:   •  atorvastatin (Lipitor) 40 MG tablet, Take 1 tablet by mouth Daily., Disp: 90 tablet, Rfl: 1  •  Blood Glucose Monitoring Suppl (ACCU-CHEK MELECOI PLUS) w/Device kit, Use as directed to check BS daily E11.9, Disp: 1 kit, Rfl: 0  •  ezetimibe (Zetia) 10 MG tablet, Take 1 tablet by mouth Daily., Disp: 90 tablet, Rfl: 1  •  glimepiride (Amaryl) 2 MG tablet, Take 1 tablet by mouth 2 (Two) Times a Day., Disp: 180 tablet, Rfl: 1  •  glucose blood (Accu-Chek Melecio Plus) test strip, 1 each by Other route Daily. Use as instructed, Disp: 100 each, Rfl: 3  •  HYDROcodone-acetaminophen (NORCO) 5-325 MG per tablet, Take 1 tablet by mouth Every 6 (Six) Hours As Needed for Moderate  "Pain ., Disp: 12 tablet, Rfl: 0  •  Insulin Pen Needle 32G X 4 MM misc, Use QD, Disp: 100 each, Rfl: 3  •  Lancets (accu-chek soft touch) lancets, Use as directed to test once daily E11.9, Disp: 100 each, Rfl: 3  •  Liraglutide (Victoza) 18 MG/3ML solution pen-injector injection, Inject 1.8 mg under the skin into the appropriate area as directed Daily., Disp: 27 mL, Rfl: 1  •  lisinopril (PRINIVIL,ZESTRIL) 40 MG tablet, Take 1 tablet by mouth Daily., Disp: 90 tablet, Rfl: 1  •  metaxalone (SKELAXIN) 800 MG tablet, Take 1 tablet by mouth 3 (Three) Times a Day., Disp: 45 tablet, Rfl: 0  •  pioglitazone (ACTOS) 30 MG tablet, Take 1 tablet by mouth Daily., Disp: 90 tablet, Rfl: 1.  he denies medication side effects.    All of the other chronic condition(s) listed above are stable w/o issues.    /58   Pulse 74   Temp 97 °F (36.1 °C) (Oral)   Resp 16   Ht 182.9 cm (72\")   Wt 118 kg (260 lb)   BMI 35.26 kg/m²     Results for orders placed or performed in visit on 12/22/20   Basic Metabolic Panel    Specimen: Blood   Result Value Ref Range    Glucose 127 (H) 65 - 99 mg/dL    BUN 36 (H) 8 - 23 mg/dL    Creatinine 2.04 (H) 0.76 - 1.27 mg/dL    eGFR Non African Am 33 (L) >60 mL/min/1.73    eGFR African Am 40 (L) >60 mL/min/1.73    BUN/Creatinine Ratio 17.6 7.0 - 25.0    Sodium 139 136 - 145 mmol/L    Potassium 4.2 3.5 - 5.2 mmol/L    Chloride 102 98 - 107 mmol/L    Total CO2 24.9 22.0 - 29.0 mmol/L    Calcium 10.0 8.6 - 10.5 mg/dL   Hemoglobin A1c    Specimen: Blood   Result Value Ref Range    Hemoglobin A1C 6.60 (H) 4.80 - 5.60 %   Lipid Panel    Specimen: Blood   Result Value Ref Range    Total Cholesterol 195 0 - 200 mg/dL    Triglycerides 159 (H) 0 - 150 mg/dL    HDL Cholesterol 32 (L) 40 - 60 mg/dL    VLDL Cholesterol Segundo 29 5 - 40 mg/dL    LDL Chol Calc (NIH) 134 (H) 0 - 100 mg/dL   CBC & Differential    Specimen: Blood   Result Value Ref Range    WBC 9.17 3.40 - 10.80 10*3/mm3    RBC 4.74 4.14 - 5.80 " 10*6/mm3    Hemoglobin 14.5 13.0 - 17.7 g/dL    Hematocrit 43.7 37.5 - 51.0 %    MCV 92.2 79.0 - 97.0 fL    MCH 30.6 26.6 - 33.0 pg    MCHC 33.2 31.5 - 35.7 g/dL    RDW 12.2 (L) 12.3 - 15.4 %    Platelets 278 140 - 450 10*3/mm3    Neutrophil Rel % 62.0 42.7 - 76.0 %    Lymphocyte Rel % 28.7 19.6 - 45.3 %    Monocyte Rel % 6.3 5.0 - 12.0 %    Eosinophil Rel % 1.6 0.3 - 6.2 %    Basophil Rel % 1.0 0.0 - 1.5 %    Neutrophils Absolute 5.68 1.70 - 7.00 10*3/mm3    Lymphocytes Absolute 2.63 0.70 - 3.10 10*3/mm3    Monocytes Absolute 0.58 0.10 - 0.90 10*3/mm3    Eosinophils Absolute 0.15 0.00 - 0.40 10*3/mm3    Basophils Absolute 0.09 0.00 - 0.20 10*3/mm3    Immature Granulocyte Rel % 0.4 0.0 - 0.5 %    Immature Grans Absolute 0.04 0.00 - 0.05 10*3/mm3    nRBC 0.0 0.0 - 0.2 /100 WBC           The following portions of the patient's history were reviewed and updated as appropriate: allergies, current medications, past family history, past medical history, past social history, past surgical history and problem list.    Review of Systems   Constitutional: Negative for activity change, chills and fever.   Respiratory: Negative for cough.    Cardiovascular: Negative for chest pain.   Psychiatric/Behavioral: Negative for dysphoric mood.       Objective   Physical Exam  Constitutional:       General: He is not in acute distress.     Appearance: He is well-developed.   Cardiovascular:      Rate and Rhythm: Normal rate and regular rhythm.   Pulmonary:      Effort: Pulmonary effort is normal.      Breath sounds: Normal breath sounds.   Neurological:      Mental Status: He is alert and oriented to person, place, and time.   Psychiatric:         Behavior: Behavior normal.         Thought Content: Thought content normal.     Labs reviewed with pt today during visit. All questions answered.      Assessment/Plan   Diagnoses and all orders for this visit:    1. Type 2 diabetes with nephropathy (CMS/[facility]) (Primary)  -     Liraglutide  (Victoza) 18 MG/3ML solution pen-injector injection; Inject 1.8 mg under the skin into the appropriate area as directed Daily.  Dispense: 27 mL; Refill: 1  -     glimepiride (Amaryl) 2 MG tablet; Take 1 tablet by mouth 2 (Two) Times a Day.  Dispense: 180 tablet; Refill: 1  -     pioglitazone (ACTOS) 30 MG tablet; Take 1 tablet by mouth Daily.  Dispense: 90 tablet; Refill: 1  -     Lipid panel; Future  -     Hemoglobin A1c; Future  -     MicroAlbumin, Urine, Random - Urine, Clean Catch; Future  -     Insulin Pen Needle 32G X 4 MM misc; Use QD  Dispense: 100 each; Refill: 3  -     Lancets (accu-chek soft touch) lancets; Use as directed to test once daily E11.9  Dispense: 100 each; Refill: 3  -     glucose blood (Accu-Chek Jordyn Plus) test strip; 1 each by Other route Daily. Use as instructed  Dispense: 100 each; Refill: 3    2. Hypercholesterolemia  Comments:  uncontrolled  Orders:  -     atorvastatin (Lipitor) 40 MG tablet; Take 1 tablet by mouth Daily.  Dispense: 90 tablet; Refill: 1  -     ezetimibe (Zetia) 10 MG tablet; Take 1 tablet by mouth Daily.  Dispense: 90 tablet; Refill: 1    3. Benign essential hypertension  -     lisinopril (PRINIVIL,ZESTRIL) 40 MG tablet; Take 1 tablet by mouth Daily.  Dispense: 90 tablet; Refill: 1  -     Lipid panel; Future  -     TSH; Future    4. Stage 3b chronic kidney disease    5. Screening for malignant neoplasm of prostate  -     PSA; Future    Other orders  -     Cancel: Comprehensive metabolic panel; Future  -     Cancel: CBC and Differential; Future    Pt's renal function slowly worsening and pt encouraged to go see his nephrologist for further guidance.

## 2021-01-20 ENCOUNTER — OFFICE VISIT (OUTPATIENT)
Dept: FAMILY MEDICINE CLINIC | Facility: CLINIC | Age: 66
End: 2021-01-20

## 2021-01-20 VITALS
RESPIRATION RATE: 16 BRPM | BODY MASS INDEX: 35.21 KG/M2 | HEIGHT: 72 IN | HEART RATE: 74 BPM | TEMPERATURE: 97 F | WEIGHT: 260 LBS | DIASTOLIC BLOOD PRESSURE: 58 MMHG | SYSTOLIC BLOOD PRESSURE: 126 MMHG

## 2021-01-20 DIAGNOSIS — E11.21 TYPE 2 DIABETES WITH NEPHROPATHY (HCC): Primary | Chronic | ICD-10-CM

## 2021-01-20 DIAGNOSIS — Z12.5 SCREENING FOR MALIGNANT NEOPLASM OF PROSTATE: ICD-10-CM

## 2021-01-20 DIAGNOSIS — I10 BENIGN ESSENTIAL HYPERTENSION: Chronic | ICD-10-CM

## 2021-01-20 DIAGNOSIS — E78.00 HYPERCHOLESTEROLEMIA: Chronic | ICD-10-CM

## 2021-01-20 DIAGNOSIS — N18.32 STAGE 3B CHRONIC KIDNEY DISEASE (HCC): Chronic | ICD-10-CM

## 2021-01-20 PROCEDURE — 99214 OFFICE O/P EST MOD 30 MIN: CPT | Performed by: FAMILY MEDICINE

## 2021-01-20 RX ORDER — GLIMEPIRIDE 2 MG/1
2 TABLET ORAL
Qty: 180 TABLET | Refills: 1 | Status: SHIPPED | OUTPATIENT
Start: 2021-01-20 | End: 2021-06-22 | Stop reason: SDUPTHER

## 2021-01-20 RX ORDER — EZETIMIBE 10 MG/1
10 TABLET ORAL DAILY
Qty: 90 TABLET | Refills: 1 | Status: SHIPPED | OUTPATIENT
Start: 2021-01-20 | End: 2021-06-22 | Stop reason: SDUPTHER

## 2021-01-20 RX ORDER — LISINOPRIL 40 MG/1
40 TABLET ORAL DAILY
Qty: 90 TABLET | Refills: 1 | Status: SHIPPED | OUTPATIENT
Start: 2021-01-20 | End: 2021-06-23 | Stop reason: DRUGHIGH

## 2021-01-20 RX ORDER — BLOOD SUGAR DIAGNOSTIC
1 STRIP MISCELLANEOUS DAILY
Qty: 100 EACH | Refills: 3 | Status: SHIPPED | OUTPATIENT
Start: 2021-01-20 | End: 2021-06-28 | Stop reason: SDUPTHER

## 2021-01-20 RX ORDER — PIOGLITAZONEHYDROCHLORIDE 30 MG/1
30 TABLET ORAL DAILY
Qty: 90 TABLET | Refills: 1 | Status: SHIPPED | OUTPATIENT
Start: 2021-01-20 | End: 2021-06-22 | Stop reason: SDUPTHER

## 2021-01-20 RX ORDER — LIRAGLUTIDE 6 MG/ML
1.8 INJECTION SUBCUTANEOUS DAILY
Qty: 27 ML | Refills: 1 | Status: SHIPPED | OUTPATIENT
Start: 2021-01-20 | End: 2021-06-22 | Stop reason: SDUPTHER

## 2021-01-20 RX ORDER — ATORVASTATIN CALCIUM 40 MG/1
40 TABLET, FILM COATED ORAL DAILY
Qty: 90 TABLET | Refills: 1 | Status: SHIPPED | OUTPATIENT
Start: 2021-01-20 | End: 2021-06-22 | Stop reason: SDUPTHER

## 2021-03-16 ENCOUNTER — BULK ORDERING (OUTPATIENT)
Dept: CASE MANAGEMENT | Facility: OTHER | Age: 66
End: 2021-03-16

## 2021-03-16 DIAGNOSIS — Z23 IMMUNIZATION DUE: ICD-10-CM

## 2021-06-07 PROCEDURE — C9803 HOPD COVID-19 SPEC COLLECT: HCPCS

## 2021-06-07 PROCEDURE — 99284 EMERGENCY DEPT VISIT MOD MDM: CPT

## 2021-06-07 RX ORDER — SODIUM CHLORIDE 0.9 % (FLUSH) 0.9 %
10 SYRINGE (ML) INJECTION AS NEEDED
Status: DISCONTINUED | OUTPATIENT
Start: 2021-06-07 | End: 2021-06-08

## 2021-06-08 ENCOUNTER — APPOINTMENT (OUTPATIENT)
Dept: CT IMAGING | Facility: HOSPITAL | Age: 66
End: 2021-06-08

## 2021-06-08 ENCOUNTER — HOSPITAL ENCOUNTER (OUTPATIENT)
Facility: HOSPITAL | Age: 66
Setting detail: OBSERVATION
Discharge: HOME | End: 2021-06-14
Attending: EMERGENCY MEDICINE | Admitting: INTERNAL MEDICINE

## 2021-06-08 DIAGNOSIS — N28.9 RENAL INSUFFICIENCY: ICD-10-CM

## 2021-06-08 DIAGNOSIS — K52.9 COLITIS: Primary | ICD-10-CM

## 2021-06-08 PROBLEM — Z87.19 HISTORY OF DIVERTICULITIS: Status: ACTIVE | Noted: 2021-06-08

## 2021-06-08 PROBLEM — A04.72 CLOSTRIDIUM DIFFICILE COLITIS: Status: ACTIVE | Noted: 2021-06-08

## 2021-06-08 PROBLEM — N17.9 AKI (ACUTE KIDNEY INJURY): Status: ACTIVE | Noted: 2021-06-08

## 2021-06-08 LAB
ADV 40+41 DNA STL QL NAA+NON-PROBE: NOT DETECTED
ALBUMIN SERPL-MCNC: 3.9 G/DL (ref 3.5–5.2)
ALBUMIN/GLOB SERPL: 1.3 G/DL
ALP SERPL-CCNC: 64 U/L (ref 39–117)
ALT SERPL W P-5'-P-CCNC: 42 U/L (ref 1–41)
ANION GAP SERPL CALCULATED.3IONS-SCNC: 10.3 MMOL/L (ref 5–15)
ANION GAP SERPL CALCULATED.3IONS-SCNC: 13.5 MMOL/L (ref 5–15)
AST SERPL-CCNC: 32 U/L (ref 1–40)
ASTRO TYP 1-8 RNA STL QL NAA+NON-PROBE: NOT DETECTED
BACTERIA UR QL AUTO: ABNORMAL /HPF
BASOPHILS # BLD AUTO: 0.09 10*3/MM3 (ref 0–0.2)
BASOPHILS NFR BLD AUTO: 0.6 % (ref 0–1.5)
BILIRUB SERPL-MCNC: 0.5 MG/DL (ref 0–1.2)
BILIRUB UR QL STRIP: NEGATIVE
BUN SERPL-MCNC: 41 MG/DL (ref 8–23)
BUN SERPL-MCNC: 44 MG/DL (ref 8–23)
BUN/CREAT SERPL: 11 (ref 7–25)
BUN/CREAT SERPL: 11.8 (ref 7–25)
C CAYETANENSIS DNA STL QL NAA+NON-PROBE: NOT DETECTED
C COLI+JEJ+UPSA DNA STL QL NAA+NON-PROBE: NOT DETECTED
C DIFF TOX GENS STL QL NAA+PROBE: POSITIVE
CALCIUM SPEC-SCNC: 8.4 MG/DL (ref 8.6–10.5)
CALCIUM SPEC-SCNC: 9.2 MG/DL (ref 8.6–10.5)
CHLORIDE SERPL-SCNC: 101 MMOL/L (ref 98–107)
CHLORIDE SERPL-SCNC: 103 MMOL/L (ref 98–107)
CLARITY UR: ABNORMAL
CO2 SERPL-SCNC: 21.5 MMOL/L (ref 22–29)
CO2 SERPL-SCNC: 22.7 MMOL/L (ref 22–29)
COLOR UR: ABNORMAL
CREAT SERPL-MCNC: 3.73 MG/DL (ref 0.76–1.27)
CREAT SERPL-MCNC: 3.74 MG/DL (ref 0.76–1.27)
CRYPTOSP DNA STL QL NAA+NON-PROBE: NOT DETECTED
DEPRECATED RDW RBC AUTO: 40.5 FL (ref 37–54)
DEPRECATED RDW RBC AUTO: 42.6 FL (ref 37–54)
E HISTOLYT DNA STL QL NAA+NON-PROBE: NOT DETECTED
EAEC PAA PLAS AGGR+AATA ST NAA+NON-PRB: NOT DETECTED
EC STX1+STX2 GENES STL QL NAA+NON-PROBE: NOT DETECTED
EOSINOPHIL # BLD AUTO: 0.05 10*3/MM3 (ref 0–0.4)
EOSINOPHIL NFR BLD AUTO: 0.3 % (ref 0.3–6.2)
EPEC EAE GENE STL QL NAA+NON-PROBE: NOT DETECTED
ERYTHROCYTE [DISTWIDTH] IN BLOOD BY AUTOMATED COUNT: 12.3 % (ref 12.3–15.4)
ERYTHROCYTE [DISTWIDTH] IN BLOOD BY AUTOMATED COUNT: 12.4 % (ref 12.3–15.4)
ETEC LTA+ST1A+ST1B TOX ST NAA+NON-PROBE: NOT DETECTED
FERRITIN SERPL-MCNC: 577 NG/ML (ref 30–400)
FOLATE SERPL-MCNC: >20 NG/ML (ref 4.78–24.2)
G LAMBLIA DNA STL QL NAA+NON-PROBE: NOT DETECTED
GFR SERPL CREATININE-BSD FRML MDRD: 16 ML/MIN/1.73
GFR SERPL CREATININE-BSD FRML MDRD: 16 ML/MIN/1.73
GLOBULIN UR ELPH-MCNC: 3.1 GM/DL
GLUCOSE BLDC GLUCOMTR-MCNC: 113 MG/DL (ref 70–130)
GLUCOSE BLDC GLUCOMTR-MCNC: 208 MG/DL (ref 70–130)
GLUCOSE BLDC GLUCOMTR-MCNC: 246 MG/DL (ref 70–130)
GLUCOSE SERPL-MCNC: 111 MG/DL (ref 65–99)
GLUCOSE SERPL-MCNC: 90 MG/DL (ref 65–99)
GLUCOSE UR STRIP-MCNC: NEGATIVE MG/DL
HBA1C MFR BLD: 5.4 % (ref 4.8–5.6)
HCT VFR BLD AUTO: 32.8 % (ref 37.5–51)
HCT VFR BLD AUTO: 35.7 % (ref 37.5–51)
HGB BLD-MCNC: 10.9 G/DL (ref 13–17.7)
HGB BLD-MCNC: 12.3 G/DL (ref 13–17.7)
HGB UR QL STRIP.AUTO: NEGATIVE
HOLD SPECIMEN: NORMAL
HOLD SPECIMEN: NORMAL
HYALINE CASTS UR QL AUTO: ABNORMAL /LPF
IMM GRANULOCYTES # BLD AUTO: 0.07 10*3/MM3 (ref 0–0.05)
IMM GRANULOCYTES NFR BLD AUTO: 0.4 % (ref 0–0.5)
IRON 24H UR-MRATE: 26 MCG/DL (ref 59–158)
IRON SATN MFR SERPL: 9 % (ref 20–50)
KETONES UR QL STRIP: ABNORMAL
LEUKOCYTE ESTERASE UR QL STRIP.AUTO: ABNORMAL
LIPASE SERPL-CCNC: 20 U/L (ref 13–60)
LYMPHOCYTES # BLD AUTO: 1.5 10*3/MM3 (ref 0.7–3.1)
LYMPHOCYTES NFR BLD AUTO: 9.4 % (ref 19.6–45.3)
MCH RBC QN AUTO: 31.3 PG (ref 26.6–33)
MCH RBC QN AUTO: 31.3 PG (ref 26.6–33)
MCHC RBC AUTO-ENTMCNC: 33.2 G/DL (ref 31.5–35.7)
MCHC RBC AUTO-ENTMCNC: 34.5 G/DL (ref 31.5–35.7)
MCV RBC AUTO: 90.8 FL (ref 79–97)
MCV RBC AUTO: 94.3 FL (ref 79–97)
MONOCYTES # BLD AUTO: 1.83 10*3/MM3 (ref 0.1–0.9)
MONOCYTES NFR BLD AUTO: 11.5 % (ref 5–12)
NEUTROPHILS NFR BLD AUTO: 12.39 10*3/MM3 (ref 1.7–7)
NEUTROPHILS NFR BLD AUTO: 77.8 % (ref 42.7–76)
NITRITE UR QL STRIP: NEGATIVE
NOROVIRUS GI+II RNA STL QL NAA+NON-PROBE: NOT DETECTED
NRBC BLD AUTO-RTO: 0 /100 WBC (ref 0–0.2)
P SHIGELLOIDES DNA STL QL NAA+NON-PROBE: NOT DETECTED
PH UR STRIP.AUTO: <=5 [PH] (ref 5–8)
PLATELET # BLD AUTO: 272 10*3/MM3 (ref 140–450)
PLATELET # BLD AUTO: 311 10*3/MM3 (ref 140–450)
PMV BLD AUTO: 10.1 FL (ref 6–12)
PMV BLD AUTO: 10.4 FL (ref 6–12)
POTASSIUM SERPL-SCNC: 4.2 MMOL/L (ref 3.5–5.2)
POTASSIUM SERPL-SCNC: 4.4 MMOL/L (ref 3.5–5.2)
PROT SERPL-MCNC: 7 G/DL (ref 6–8.5)
PROT UR QL STRIP: ABNORMAL
RBC # BLD AUTO: 3.48 10*6/MM3 (ref 4.14–5.8)
RBC # BLD AUTO: 3.93 10*6/MM3 (ref 4.14–5.8)
RBC # UR: ABNORMAL /HPF
REF LAB TEST METHOD: ABNORMAL
RVA RNA STL QL NAA+NON-PROBE: NOT DETECTED
S ENT+BONG DNA STL QL NAA+NON-PROBE: NOT DETECTED
SAPO I+II+IV+V RNA STL QL NAA+NON-PROBE: NOT DETECTED
SARS-COV-2 RNA RESP QL NAA+PROBE: NOT DETECTED
SHIGELLA SP+EIEC IPAH ST NAA+NON-PROBE: NOT DETECTED
SODIUM SERPL-SCNC: 136 MMOL/L (ref 136–145)
SODIUM SERPL-SCNC: 136 MMOL/L (ref 136–145)
SODIUM UR-SCNC: 22 MMOL/L
SP GR UR STRIP: 1.02 (ref 1–1.03)
SQUAMOUS #/AREA URNS HPF: ABNORMAL /HPF
TIBC SERPL-MCNC: 295 MCG/DL (ref 298–536)
TRANSFERRIN SERPL-MCNC: 198 MG/DL (ref 200–360)
UROBILINOGEN UR QL STRIP: ABNORMAL
V CHOL+PARA+VUL DNA STL QL NAA+NON-PROBE: NOT DETECTED
V CHOLERAE DNA STL QL NAA+NON-PROBE: NOT DETECTED
VIT B12 BLD-MCNC: 657 PG/ML (ref 211–946)
WBC # BLD AUTO: 15.42 10*3/MM3 (ref 3.4–10.8)
WBC # BLD AUTO: 15.93 10*3/MM3 (ref 3.4–10.8)
WBC UR QL AUTO: ABNORMAL /HPF
WHOLE BLOOD HOLD SPECIMEN: NORMAL
WHOLE BLOOD HOLD SPECIMEN: NORMAL
Y ENTEROCOL DNA STL QL NAA+NON-PROBE: NOT DETECTED

## 2021-06-08 PROCEDURE — 84300 ASSAY OF URINE SODIUM: CPT | Performed by: INTERNAL MEDICINE

## 2021-06-08 PROCEDURE — 36415 COLL VENOUS BLD VENIPUNCTURE: CPT | Performed by: NURSE PRACTITIONER

## 2021-06-08 PROCEDURE — 83036 HEMOGLOBIN GLYCOSYLATED A1C: CPT | Performed by: NURSE PRACTITIONER

## 2021-06-08 PROCEDURE — 25010000002 PIPERACILLIN SOD-TAZOBACTAM PER 1 G: Performed by: EMERGENCY MEDICINE

## 2021-06-08 PROCEDURE — 83690 ASSAY OF LIPASE: CPT

## 2021-06-08 PROCEDURE — 36415 COLL VENOUS BLD VENIPUNCTURE: CPT

## 2021-06-08 PROCEDURE — 25010000002 ONDANSETRON PER 1 MG: Performed by: EMERGENCY MEDICINE

## 2021-06-08 PROCEDURE — 82607 VITAMIN B-12: CPT | Performed by: NURSE PRACTITIONER

## 2021-06-08 PROCEDURE — 85027 COMPLETE CBC AUTOMATED: CPT | Performed by: NURSE PRACTITIONER

## 2021-06-08 PROCEDURE — 96361 HYDRATE IV INFUSION ADD-ON: CPT

## 2021-06-08 PROCEDURE — 25010000002 HYDROMORPHONE PER 4 MG: Performed by: EMERGENCY MEDICINE

## 2021-06-08 PROCEDURE — 96375 TX/PRO/DX INJ NEW DRUG ADDON: CPT

## 2021-06-08 PROCEDURE — 0097U HC BIOFIRE FILMARRAY GI PANEL: CPT | Performed by: EMERGENCY MEDICINE

## 2021-06-08 PROCEDURE — G0378 HOSPITAL OBSERVATION PER HR: HCPCS

## 2021-06-08 PROCEDURE — 82746 ASSAY OF FOLIC ACID SERUM: CPT | Performed by: NURSE PRACTITIONER

## 2021-06-08 PROCEDURE — 96365 THER/PROPH/DIAG IV INF INIT: CPT

## 2021-06-08 PROCEDURE — 74176 CT ABD & PELVIS W/O CONTRAST: CPT

## 2021-06-08 PROCEDURE — 80053 COMPREHEN METABOLIC PANEL: CPT

## 2021-06-08 PROCEDURE — 63710000001 INSULIN LISPRO (HUMAN) PER 5 UNITS: Performed by: NURSE PRACTITIONER

## 2021-06-08 PROCEDURE — 83540 ASSAY OF IRON: CPT | Performed by: NURSE PRACTITIONER

## 2021-06-08 PROCEDURE — U0003 INFECTIOUS AGENT DETECTION BY NUCLEIC ACID (DNA OR RNA); SEVERE ACUTE RESPIRATORY SYNDROME CORONAVIRUS 2 (SARS-COV-2) (CORONAVIRUS DISEASE [COVID-19]), AMPLIFIED PROBE TECHNIQUE, MAKING USE OF HIGH THROUGHPUT TECHNOLOGIES AS DESCRIBED BY CMS-2020-01-R: HCPCS | Performed by: EMERGENCY MEDICINE

## 2021-06-08 PROCEDURE — 85025 COMPLETE CBC W/AUTO DIFF WBC: CPT

## 2021-06-08 PROCEDURE — 81001 URINALYSIS AUTO W/SCOPE: CPT

## 2021-06-08 PROCEDURE — 82962 GLUCOSE BLOOD TEST: CPT

## 2021-06-08 PROCEDURE — 84466 ASSAY OF TRANSFERRIN: CPT | Performed by: NURSE PRACTITIONER

## 2021-06-08 PROCEDURE — 82728 ASSAY OF FERRITIN: CPT | Performed by: NURSE PRACTITIONER

## 2021-06-08 PROCEDURE — 87493 C DIFF AMPLIFIED PROBE: CPT | Performed by: EMERGENCY MEDICINE

## 2021-06-08 PROCEDURE — 96376 TX/PRO/DX INJ SAME DRUG ADON: CPT

## 2021-06-08 RX ORDER — INSULIN LISPRO 100 [IU]/ML
0-9 INJECTION, SOLUTION INTRAVENOUS; SUBCUTANEOUS
Status: DISCONTINUED | OUTPATIENT
Start: 2021-06-08 | End: 2021-06-14 | Stop reason: HOSPADM

## 2021-06-08 RX ORDER — SODIUM CHLORIDE 9 MG/ML
100 INJECTION, SOLUTION INTRAVENOUS CONTINUOUS
Status: ACTIVE | OUTPATIENT
Start: 2021-06-08 | End: 2021-06-09

## 2021-06-08 RX ORDER — ONDANSETRON 2 MG/ML
4 INJECTION INTRAMUSCULAR; INTRAVENOUS ONCE
Status: COMPLETED | OUTPATIENT
Start: 2021-06-08 | End: 2021-06-08

## 2021-06-08 RX ORDER — VANCOMYCIN HYDROCHLORIDE 125 MG/1
125 CAPSULE ORAL EVERY 6 HOURS SCHEDULED
Status: DISCONTINUED | OUTPATIENT
Start: 2021-06-08 | End: 2021-06-14 | Stop reason: HOSPADM

## 2021-06-08 RX ORDER — DEXTROSE MONOHYDRATE 25 G/50ML
25 INJECTION, SOLUTION INTRAVENOUS
Status: DISCONTINUED | OUTPATIENT
Start: 2021-06-08 | End: 2021-06-14 | Stop reason: HOSPADM

## 2021-06-08 RX ORDER — ONDANSETRON 2 MG/ML
4 INJECTION INTRAMUSCULAR; INTRAVENOUS EVERY 6 HOURS PRN
Status: DISCONTINUED | OUTPATIENT
Start: 2021-06-08 | End: 2021-06-14 | Stop reason: HOSPADM

## 2021-06-08 RX ORDER — CALCIUM CARBONATE 200(500)MG
2 TABLET,CHEWABLE ORAL 2 TIMES DAILY PRN
Status: DISCONTINUED | OUTPATIENT
Start: 2021-06-08 | End: 2021-06-08

## 2021-06-08 RX ORDER — ATORVASTATIN CALCIUM 20 MG/1
40 TABLET, FILM COATED ORAL DAILY
Status: DISCONTINUED | OUTPATIENT
Start: 2021-06-08 | End: 2021-06-14 | Stop reason: HOSPADM

## 2021-06-08 RX ORDER — NITROGLYCERIN 0.4 MG/1
0.4 TABLET SUBLINGUAL
Status: DISCONTINUED | OUTPATIENT
Start: 2021-06-08 | End: 2021-06-14 | Stop reason: HOSPADM

## 2021-06-08 RX ORDER — ACETAMINOPHEN 325 MG/1
650 TABLET ORAL EVERY 4 HOURS PRN
Status: DISCONTINUED | OUTPATIENT
Start: 2021-06-08 | End: 2021-06-14 | Stop reason: HOSPADM

## 2021-06-08 RX ORDER — HYDROMORPHONE HYDROCHLORIDE 1 MG/ML
0.5 INJECTION, SOLUTION INTRAMUSCULAR; INTRAVENOUS; SUBCUTANEOUS ONCE
Status: COMPLETED | OUTPATIENT
Start: 2021-06-08 | End: 2021-06-08

## 2021-06-08 RX ORDER — ACETAMINOPHEN 650 MG/1
650 SUPPOSITORY RECTAL EVERY 4 HOURS PRN
Status: DISCONTINUED | OUTPATIENT
Start: 2021-06-08 | End: 2021-06-08

## 2021-06-08 RX ORDER — ASPIRIN 81 MG/1
81 TABLET ORAL DAILY
Status: DISCONTINUED | OUTPATIENT
Start: 2021-06-08 | End: 2021-06-14 | Stop reason: HOSPADM

## 2021-06-08 RX ORDER — ONDANSETRON 4 MG/1
4 TABLET, FILM COATED ORAL EVERY 6 HOURS PRN
Status: DISCONTINUED | OUTPATIENT
Start: 2021-06-08 | End: 2021-06-14 | Stop reason: HOSPADM

## 2021-06-08 RX ORDER — NICOTINE POLACRILEX 4 MG
15 LOZENGE BUCCAL
Status: DISCONTINUED | OUTPATIENT
Start: 2021-06-08 | End: 2021-06-14 | Stop reason: HOSPADM

## 2021-06-08 RX ORDER — SODIUM CHLORIDE 0.9 % (FLUSH) 0.9 %
10 SYRINGE (ML) INJECTION AS NEEDED
Status: DISCONTINUED | OUTPATIENT
Start: 2021-06-08 | End: 2021-06-08

## 2021-06-08 RX ORDER — SODIUM CHLORIDE 0.9 % (FLUSH) 0.9 %
10 SYRINGE (ML) INJECTION EVERY 12 HOURS SCHEDULED
Status: DISCONTINUED | OUTPATIENT
Start: 2021-06-08 | End: 2021-06-08

## 2021-06-08 RX ORDER — ACETAMINOPHEN 160 MG/5ML
650 SOLUTION ORAL EVERY 4 HOURS PRN
Status: DISCONTINUED | OUTPATIENT
Start: 2021-06-08 | End: 2021-06-08

## 2021-06-08 RX ADMIN — INSULIN LISPRO 4 UNITS: 100 INJECTION, SOLUTION INTRAVENOUS; SUBCUTANEOUS at 20:43

## 2021-06-08 RX ADMIN — ACETAMINOPHEN 650 MG: 325 TABLET, FILM COATED ORAL at 18:42

## 2021-06-08 RX ADMIN — TAZOBACTAM SODIUM AND PIPERACILLIN SODIUM 3.38 G: 375; 3 INJECTION, SOLUTION INTRAVENOUS at 06:15

## 2021-06-08 RX ADMIN — ACETAMINOPHEN 650 MG: 325 TABLET, FILM COATED ORAL at 14:15

## 2021-06-08 RX ADMIN — HYDROMORPHONE HYDROCHLORIDE 0.5 MG: 1 INJECTION, SOLUTION INTRAMUSCULAR; INTRAVENOUS; SUBCUTANEOUS at 03:23

## 2021-06-08 RX ADMIN — SODIUM CHLORIDE 100 ML/HR: 9 INJECTION, SOLUTION INTRAVENOUS at 09:48

## 2021-06-08 RX ADMIN — SODIUM CHLORIDE 1000 ML: 9 INJECTION, SOLUTION INTRAVENOUS at 03:15

## 2021-06-08 RX ADMIN — VANCOMYCIN HYDROCHLORIDE 125 MG: 125 CAPSULE ORAL at 13:47

## 2021-06-08 RX ADMIN — HYDROMORPHONE HYDROCHLORIDE 0.5 MG: 1 INJECTION, SOLUTION INTRAMUSCULAR; INTRAVENOUS; SUBCUTANEOUS at 06:45

## 2021-06-08 RX ADMIN — VANCOMYCIN HYDROCHLORIDE 125 MG: 125 CAPSULE ORAL at 17:10

## 2021-06-08 RX ADMIN — VANCOMYCIN HYDROCHLORIDE 125 MG: 125 CAPSULE ORAL at 23:12

## 2021-06-08 RX ADMIN — ONDANSETRON 4 MG: 2 INJECTION INTRAMUSCULAR; INTRAVENOUS at 03:23

## 2021-06-08 RX ADMIN — INSULIN LISPRO 4 UNITS: 100 INJECTION, SOLUTION INTRAVENOUS; SUBCUTANEOUS at 17:07

## 2021-06-08 RX ADMIN — ASPIRIN 81 MG: 81 TABLET, FILM COATED ORAL at 17:07

## 2021-06-08 RX ADMIN — ATORVASTATIN CALCIUM 40 MG: 20 TABLET, FILM COATED ORAL at 17:07

## 2021-06-08 NOTE — PLAN OF CARE
Problem: Adult Inpatient Plan of Care  Goal: Plan of Care Review  Outcome: Ongoing, Progressing  Flowsheets (Taken 6/8/2021 1743)  Progress: no change  Plan of Care Reviewed With: patient  Outcome Summary: pt. arrived on floor early this morning. Pt. is A&Ox4 with VSS on monitor. pt. has had several BM's today since arriving on floor and reports them as liquid. pt. has been up ad noam all day with steady gait and daughter at bedside. PRN tylenol given for abd. pain. pt. has made needs known and they were met. IVF infusing as perscribed. Will cont. to monitor.  Goal: Patient-Specific Goal (Individualized)  Outcome: Ongoing, Progressing  Goal: Absence of Hospital-Acquired Illness or Injury  Outcome: Ongoing, Progressing  Intervention: Identify and Manage Fall Risk  Recent Flowsheet Documentation  Taken 6/8/2021 1600 by Kemal Reese, BRITTANY  Safety Promotion/Fall Prevention:   assistive device/personal items within reach   clutter free environment maintained   fall prevention program maintained   lighting adjusted   mobility aid in reach   nonskid shoes/slippers when out of bed   room organization consistent   safety round/check completed  Taken 6/8/2021 1400 by Kemal Reese, RN  Safety Promotion/Fall Prevention:   activity supervised   assistive device/personal items within reach   clutter free environment maintained   fall prevention program maintained   lighting adjusted   mobility aid in reach   nonskid shoes/slippers when out of bed   room organization consistent   safety round/check completed  Taken 6/8/2021 1200 by Kemal Reese, RN  Safety Promotion/Fall Prevention:   activity supervised   assistive device/personal items within reach   fall prevention program maintained   clutter free environment maintained   lighting adjusted   mobility aid in reach   nonskid shoes/slippers when out of bed   room organization consistent   safety round/check completed  Taken 6/8/2021 1000 by Kemal Reese, RN  Safety  Promotion/Fall Prevention:   activity supervised   assistive device/personal items within reach   clutter free environment maintained   fall prevention program maintained   lighting adjusted   mobility aid in reach   nonskid shoes/slippers when out of bed   room organization consistent   safety round/check completed  Intervention: Prevent Infection  Recent Flowsheet Documentation  Taken 6/8/2021 1600 by Kemal Reese RN  Infection Prevention:   environmental surveillance performed   hand hygiene promoted   personal protective equipment utilized   rest/sleep promoted   single patient room provided   visitors restricted/screened  Taken 6/8/2021 1400 by Kemal Reese RN  Infection Prevention:   cohorting utilized   environmental surveillance performed   hand hygiene promoted   personal protective equipment utilized   rest/sleep promoted   single patient room provided  Taken 6/8/2021 1200 by Kemal Reese RN  Infection Prevention:   cohorting utilized   environmental surveillance performed   hand hygiene promoted   rest/sleep promoted   personal protective equipment utilized   single patient room provided  Taken 6/8/2021 1000 by Kemal Reese RN  Infection Prevention:   cohorting utilized   environmental surveillance performed   hand hygiene promoted   personal protective equipment utilized   rest/sleep promoted   single patient room provided  Goal: Optimal Comfort and Wellbeing  Outcome: Ongoing, Progressing  Intervention: Provide Person-Centered Care  Recent Flowsheet Documentation  Taken 6/8/2021 1000 by Kemal Reese RN  Trust Relationship/Rapport:   care explained   choices provided   emotional support provided   empathic listening provided   questions answered   questions encouraged   reassurance provided   thoughts/feelings acknowledged  Goal: Readiness for Transition of Care  Outcome: Ongoing, Progressing  Intervention: Mutually Develop Transition Plan  Recent Flowsheet Documentation  Taken 6/8/2021 0939 by  Kemal Reese, RN  Transportation Anticipated: family or friend will provide  Patient/Family Anticipated Services at Transition: none  Patient/Family Anticipates Transition to: home with family  Taken 6/8/2021 0994 by Kemal Reese, RN  Equipment Currently Used at Home: cane, straight     Problem: Skin Injury Risk Increased  Goal: Skin Health and Integrity  Outcome: Ongoing, Progressing   Goal Outcome Evaluation:  Plan of Care Reviewed With: patient        Progress: no change  Outcome Summary: pt. arrived on floor early this morning. Pt. is A&Ox4 with VSS on monitor. pt. has had several BM's today since arriving on floor and reports them as liquid. pt. has been up ad noam all day with steady gait and daughter at bedside. PRN tylenol given for abd. pain. pt. has made needs known and they were met. IVF infusing as perscribed. Will cont. to monitor.

## 2021-06-08 NOTE — PROGRESS NOTES
Pharmacy Consult to Dose Piperacillin-Tazobactam    Junior Pollard Jr. is a 65 y.o. male 118 kg (260 lb).    Consulting Provider: Tierra Pearson APRN  Indication: Intra-Abdominal Infection    Anti-Infectives (From admission, onward)    Ordered     Dose/Rate Route Frequency Start Stop    06/08/21 0757  piperacillin-tazobactam (ZOSYN) 3.375 g in iso-osmotic dextrose 50 ml (premix)     Ordering Provider: Tierra Pearson APRN    3.375 g  over 4 Hours Intravenous Every 8 Hours 06/08/21 1200 06/13/21 1159    06/08/21 0601  Pharmacy to Dose Zosyn     Ordering Provider: Tierra Pearson APRN     Does not apply Continuous PRN 06/08/21 0601 06/13/21 0600    06/08/21 0558  piperacillin-tazobactam (ZOSYN) 3.375 g in iso-osmotic dextrose 50 ml (premix)     Ordering Provider: Patrice Sterling MD    3.375 g  over 30 Minutes Intravenous Once 06/08/21 0600 06/08/21 0657         Results from last 7 days   Lab Units 06/08/21  0125   CREATININE mg/dL 3.74*     Estimated Creatinine Clearance: 25.4 mL/min (A) (by C-G formula based on SCr of 3.74 mg/dL (H)).  Body mass index is 35.25 kg/m².    WBC   Date Value Ref Range Status   06/08/2021 15.93 (H) 3.40 - 10.80 10*3/mm3 Final     Temp:  [98.9 °F (37.2 °C)] 98.9 °F (37.2 °C)  Heart Rate:  [] 76  Resp:  [19] 19  BP: ()/(51-52) 112/52    Assessment/Plan:  Begin piperacillin-tazobactam 3.375 g IV q8h infused over 4 hours.  Will monitor and adjust as needed.     Thank you,    Alfonso Hood, RalphD, MARIKA, BCPS  06/08/21 07:57 EDT

## 2021-06-08 NOTE — NURSING NOTE
CWON consult received. Patient with mild redness to bilateral inner glutes. To left inner buttocks there is a small irregular area that is deep red in color, intact and non-blanchable. Surrounding this, skin is intact, red but blanchable.    Patient states he has recently been having multiple episodes of diarrhea. Patient states he is continent and spends most his time in his recliner at home. Skin injury most likely caused by a combination of both pressure and moisture. Will recommend Bina's Magic ointment for moisture barrier protection. He is agreeable.    Please call with any questions.

## 2021-06-08 NOTE — H&P
Patient Name:  Junior Pollard Jr.  YOB: 1955  MRN:  [id number]  Admit Date:  6/8/2021  Patient Care Team:  Boogie Interiano MD as PCP - General (Family Medicine)  Gerald Bravo MD as Consulting Physician (Nephrology)      Subjective   History Present Illness     Chief Complaint   Patient presents with   • Diarrhea   • Nausea       Mr. Pollard is a 65 y.o. with a history of DM2, HLD, HTN, obesity that presents with abdominal pain and diarrhea.  He states he had 2 weeks of constipation which is abnormal for him.  Friday 6/14 developed diarrhea. He did not take any laxatives or stool softeners. Diarrhea described as watery, nonbloody, odiferous averaging 5-6 BMs per day.  He has not taken anything to treat his diarrhea at home.  Diarrhea can be exacerbated by eating and drinking states had very little intake in the last few days.  PO intake also exacerbates of generalized abdominal cramping with mild nausea but no emesis.  He denies black or bloody stool, hematemesis, fever, chills.patient denies chest pain, shortness of breath, PND, orthopnea, lower extremity swelling.  He has not traveled recently, denies exposure to contaminated individuals, no antibiotics within the last 3 months.  No prior history of C. difficile colitis.  History of diverticulitis listed but he has never had a colonoscopy or a Cologuard test.  No known family history of colon cancer.  He has had a history of CKD 3 be followed by Dr. Bravo.  Diabetes mellitus is well controlled based on last hemoglobin A1c.          History of Present Illness  Review of Systems   Constitutional: Positive for appetite change and fatigue. Negative for unexpected weight change.   HENT: Negative for trouble swallowing.    Respiratory: Negative for cough, choking, chest tightness and shortness of breath.    Cardiovascular: Negative for chest pain.   Gastrointestinal: Positive for abdominal pain, diarrhea and nausea. Negative for  abdominal distention, blood in stool, constipation, rectal pain and vomiting.   Genitourinary: Negative for dysuria.   Musculoskeletal: Negative for back pain.   Skin: Negative for color change.   Neurological: Negative for dizziness and light-headedness.   Hematological: Does not bruise/bleed easily.   Psychiatric/Behavioral: Negative.  Negative for confusion.        Personal History     Past Medical History:   Diagnosis Date   • Arthritis    • Diabetes mellitus (CMS/[facility])    • Diabetic eye exam (CMS/[facility]) 01/24/2017    DUE   • Hyperlipidemia    • Hypertension      Past Surgical History:   Procedure Laterality Date   • FINGER SURGERY Right     Fourth Digit.     Family History   Problem Relation Age of Onset   • Heart disease Mother    • Diabetes Mother    • Heart disease Father    • Hypertension Father    • Diabetes Father      Social History     Tobacco Use   • Smoking status: Never Smoker   • Smokeless tobacco: Never Used   Substance Use Topics   • Alcohol use: No   • Drug use: Not on file     No current facility-administered medications on file prior to encounter.     Current Outpatient Medications on File Prior to Encounter   Medication Sig Dispense Refill   • aspirin (aspirin) 81 MG EC tablet Take 81 mg by mouth Daily.     • atorvastatin (Lipitor) 40 MG tablet Take 1 tablet by mouth Daily. 90 tablet 1   • Blood Glucose Monitoring Suppl (ACCU-CHEK MELECIO PLUS) w/Device kit Use as directed to check BS daily E11.9 1 kit 0   • ezetimibe (Zetia) 10 MG tablet Take 1 tablet by mouth Daily. 90 tablet 1   • glimepiride (Amaryl) 2 MG tablet Take 1 tablet by mouth 2 (Two) Times a Day. 180 tablet 1   • glucose blood (Accu-Chek Melecio Plus) test strip 1 each by Other route Daily. Use as instructed 100 each 3   • HYDROcodone-acetaminophen (NORCO) 5-325 MG per tablet Take 1 tablet by mouth Every 6 (Six) Hours As Needed for Moderate Pain . 12 tablet 0   • Insulin Pen Needle 32G X 4 MM misc Use  each 3   • Lancets (accu-chek  soft touch) lancets Use as directed to test once daily E11.9 100 each 3   • Liraglutide (Victoza) 18 MG/3ML solution pen-injector injection Inject 1.8 mg under the skin into the appropriate area as directed Daily. 27 mL 1   • lisinopril (PRINIVIL,ZESTRIL) 40 MG tablet Take 1 tablet by mouth Daily. 90 tablet 1   • metaxalone (SKELAXIN) 800 MG tablet Take 1 tablet by mouth 3 (Three) Times a Day. 45 tablet 0   • pioglitazone (ACTOS) 30 MG tablet Take 1 tablet by mouth Daily. 90 tablet 1     No Known Allergies    Objective    Objective     Vital Signs  Temp:  [98.6 °F (37 °C)-98.9 °F (37.2 °C)] 98.6 °F (37 °C)  Heart Rate:  [] 86  Resp:  [18-19] 18  BP: ()/(51-57) 133/57  SpO2:  [94 %-98 %] 95 %  on   ;   Device (Oxygen Therapy): room air  Body mass index is 33.9 kg/m².    Physical Exam  Vitals and nursing note reviewed.   Constitutional:       Appearance: He is well-developed. He is obese.   HENT:      Head: Normocephalic and atraumatic.      Mouth/Throat:      Mouth: Mucous membranes are dry.   Eyes:      Conjunctiva/sclera: Conjunctivae normal.   Neck:      Trachea: No tracheal deviation.   Cardiovascular:      Rate and Rhythm: Normal rate and regular rhythm.      Heart sounds: No murmur heard.     Pulmonary:      Effort: Pulmonary effort is normal. No respiratory distress.      Breath sounds: Normal breath sounds.   Abdominal:      General: Bowel sounds are normal. There is no distension.      Palpations: Abdomen is soft. There is no mass.      Tenderness: There is abdominal tenderness. There is no guarding or rebound.      Comments: Protuberant abdomen but soft, generalized tenderness   Musculoskeletal:         General: Normal range of motion.      Cervical back: Normal range of motion.      Right lower leg: No edema.      Left lower leg: No edema.   Skin:     General: Skin is warm and dry.      Capillary Refill: Capillary refill takes less than 2 seconds.   Neurological:      General: No focal deficit  present.      Mental Status: He is alert and oriented to person, place, and time.   Psychiatric:         Mood and Affect: Mood normal.         Thought Content: Thought content normal.         Results Review:  I reviewed the patient's new clinical results.  I reviewed the patient's new imaging results and agree with the interpretation.  I reviewed the patient's other test results and agree with the interpretation  I personally viewed and interpreted the patient's EKG/Telemetry data  Discussed with ED provider.    Lab Results (last 24 hours)     Procedure Component Value Units Date/Time    CBC & Differential [[id number]]  (Abnormal) Collected: 06/08/21 0125    Specimen: Blood Updated: 06/08/21 0137    Narrative:      The following orders were created for panel order CBC & Differential.  Procedure                               Abnormality         Status                     ---------                               -----------         ------                     CBC Auto Differential[[id number]]        Abnormal            Final result                 Please view results for these tests on the individual orders.    Comprehensive Metabolic Panel [[id number]]  (Abnormal) Collected: 06/08/21 0125    Specimen: Blood Updated: 06/08/21 0201     Glucose 90 mg/dL      BUN 41 mg/dL      Creatinine 3.74 mg/dL      Sodium 136 mmol/L      Potassium 4.2 mmol/L      Chloride 101 mmol/L      CO2 21.5 mmol/L      Calcium 9.2 mg/dL      Total Protein 7.0 g/dL      Albumin 3.90 g/dL      ALT (SGPT) 42 U/L      AST (SGOT) 32 U/L      Alkaline Phosphatase 64 U/L      Total Bilirubin 0.5 mg/dL      eGFR Non African Amer 16 mL/min/1.73      Globulin 3.1 gm/dL      A/G Ratio 1.3 g/dL      BUN/Creatinine Ratio 11.0     Anion Gap 13.5 mmol/L     Narrative:      GFR Normal >60  Chronic Kidney Disease <60  Kidney Failure <15      Lipase [[id number]]  (Normal) Collected: 06/08/21 0125    Specimen: Blood Updated: 06/08/21 0201     Lipase 20 U/L     CBC  Auto Differential [[id number]]  (Abnormal) Collected: 06/08/21 0125    Specimen: Blood Updated: 06/08/21 0137     WBC 15.93 10*3/mm3      RBC 3.93 10*6/mm3      Hemoglobin 12.3 g/dL      Hematocrit 35.7 %      MCV 90.8 fL      MCH 31.3 pg      MCHC 34.5 g/dL      RDW 12.4 %      RDW-SD 40.5 fl      MPV 10.1 fL      Platelets 311 10*3/mm3      Neutrophil % 77.8 %      Lymphocyte % 9.4 %      Monocyte % 11.5 %      Eosinophil % 0.3 %      Basophil % 0.6 %      Immature Grans % 0.4 %      Neutrophils, Absolute 12.39 10*3/mm3      Lymphocytes, Absolute 1.50 10*3/mm3      Monocytes, Absolute 1.83 10*3/mm3      Eosinophils, Absolute 0.05 10*3/mm3      Basophils, Absolute 0.09 10*3/mm3      Immature Grans, Absolute 0.07 10*3/mm3      nRBC 0.0 /100 WBC     Urinalysis With Microscopic If Indicated (No Culture) - Urine, Clean Catch [[id number]]  (Abnormal) Collected: 06/08/21 0126    Specimen: Urine, Clean Catch Updated: 06/08/21 0204     Color, UA Dark Yellow     Appearance, UA Cloudy     pH, UA <=5.0     Specific Gravity, UA 1.025     Glucose, UA Negative     Ketones, UA 15 mg/dL (1+)     Bilirubin, UA Negative     Blood, UA Negative     Protein, UA Trace     Leuk Esterase, UA Trace     Nitrite, UA Negative     Urobilinogen, UA 1.0 E.U./dL    Urinalysis, Microscopic Only - Urine, Clean Catch [[id number]]  (Abnormal) Collected: 06/08/21 0126    Specimen: Urine, Clean Catch Updated: 06/08/21 0240     RBC, UA None Seen /HPF      WBC, UA 3-5 /HPF      Bacteria, UA None Seen /HPF      Squamous Epithelial Cells, UA 0-2 /HPF      Hyaline Casts, UA 0-2 /LPF      Methodology Manual Light Microscopy    Gastrointestinal Panel, PCR - Stool, Per Rectum [[id number]]  (Normal) Collected: 06/08/21 0614    Specimen: Stool from Per Rectum Updated: 06/08/21 0748     Campylobacter Not Detected     Plesiomonas shigelloides Not Detected     Salmonella Not Detected     Vibrio Not Detected     Vibrio cholerae Not Detected     Yersinia enterocolitica  Not Detected     Enteroaggregative E. coli (EAEC) Not Detected     Enteropathogenic E. coli (EPEC) Not Detected     Enterotoxigenic E. coli (ETEC) lt/st Not Detected     Shiga-like toxin-producing E. coli (STEC) stx1/stx2 Not Detected     Shigella/Enteroinvasive E. coli (EIEC) Not Detected     Cryptosporidium Not Detected     Cyclospora cayetanensis Not Detected     Entamoeba histolytica Not Detected     Giardia lamblia Not Detected     Adenovirus F40/41 Not Detected     Astrovirus Not Detected     Norovirus GI/GII Not Detected     Rotavirus A Not Detected     Sapovirus (I, II, IV or V) Not Detected    Narrative:      If Aeromonas, Staphylococcus aureus or Bacillus cereus are suspected, please order [id number]: Stool Culture, Aeromonas, S aureus, B Cereus.    Clostridium Difficile Toxin - Stool, Per Rectum [[id number]]  (Abnormal) Collected: 06/08/21 0614    Specimen: Stool from Per Rectum Updated: 06/08/21 0730    Narrative:      The following orders were created for panel order Clostridium Difficile Toxin - Stool, Per Rectum.  Procedure                               Abnormality         Status                     ---------                               -----------         ------                     Clostridium Difficile To...[[id number]]  Abnormal            Final result                 Please view results for these tests on the individual orders.    Clostridium Difficile Toxin, PCR - Stool, Per Rectum [[id number]]  (Abnormal) Collected: 06/08/21 0614    Specimen: Stool from Per Rectum Updated: 06/08/21 0730     C. Difficile Toxins by PCR Positive    COVID PRE-OP / PRE-PROCEDURE SCREENING ORDER (NO ISOLATION) - Swab, Nasopharynx [[id number]]  (Normal) Collected: 06/08/21 0615    Specimen: Swab from Nasopharynx Updated: 06/08/21 0722    Narrative:      The following orders were created for panel order COVID PRE-OP / PRE-PROCEDURE SCREENING ORDER (NO ISOLATION) - Swab, Nasopharynx.  Procedure                                Abnormality         Status                     ---------                               -----------         ------                     COVID-19,[facility]...[[id number]]  Normal              Final result                 Please view results for these tests on the individual orders.    COVID-19,[facility] CEPHEID/ANUPAM NP SWAB IN TRANSPORT MEDIA 8-12 HR TAT - Swab, Nasopharynx [[id number]]  (Normal) Collected: 06/08/21 0615    Specimen: Swab from Nasopharynx Updated: 06/08/21 0722     COVID19 Not Detected    Narrative:      Fact sheet for providers: https://www.fda.gov/media/925085/download     Fact sheet for patients: https://www.fda.gov/media/470871/download    Basic Metabolic Panel [[id number]] Collected: 06/08/21 0814    Specimen: Blood Updated: 06/08/21 0856    CBC (No Diff) [[id number]]  (Abnormal) Collected: 06/08/21 0814    Specimen: Blood Updated: 06/08/21 0900     WBC 15.42 10*3/mm3      RBC 3.48 10*6/mm3      Hemoglobin 10.9 g/dL      Hematocrit 32.8 %      MCV 94.3 fL      MCH 31.3 pg      MCHC 33.2 g/dL      RDW 12.3 %      RDW-SD 42.6 fl      MPV 10.4 fL      Platelets 272 10*3/mm3           Imaging Results (Last 24 Hours)     Procedure Component Value Units Date/Time    CT Abdomen Pelvis Without Contrast [[id number]] Collected: 06/08/21 0533     Updated: 06/08/21 0533    Narrative:        Patient: ISIS LEZAMA  Time Out: 05:32  Exam(s): CT ABDOMEN + PELVIS Without Contrast     EXAM:    CT Abdomen and Pelvis Without Intravenous Contrast    CLINICAL HISTORY:     Reason for exam: ab pain.    TECHNIQUE:    Axial computed tomography images of the abdomen and pelvis without   intravenous contrast.  CTDI is 26.40 mGy and DLP is 1458.00 mGy-cm.  This   CT exam was performed according to the principle of ALARA (As Low As   Reasonably Achievable) by using one or more of the following dose   reduction techniques: automated exposure control, adjustment of the mA   and or kV according to patient size, and or  use of iterative   reconstruction technique.    COMPARISON:    No relevant prior studies available.    FINDINGS:    Lung bases:  Calcified left basilar granuloma.  Mild bibasilar   atelectasis.     ABDOMEN:    Liver:  Unremarkable.    Gallbladder and bile ducts:  Several calcified gallstones.  No biliary   dilatation.    Pancreas:  Unremarkable.  No ductal dilation.    Spleen:  Unremarkable.  No splenomegaly.    Adrenals:  Unremarkable.  No mass.    Kidneys and ureters:  Bilateral renal parapelvic cysts.  No renal or   ureteral calculus.  No hydronephrosis.    Stomach and bowel:  Diffuse thickening of the distal transverse colon,   descending colon, sigmoid colon and rectum with adjacent stranding.    There is also mild presacral fluid stranding.  No obstruction.     PELVIS:    Appendix:  Unremarkable appendix.    Bladder:  Mild diffuse bladder wall thickening.  No stones.    Reproductive:  Mild central prostate calcifications.     ABDOMEN and PELVIS:    Intraperitoneal space:  Trace free fluid in the lower pelvis.  No   loculated fluid collection.  No free air.    Bones joints:  Multilevel degenerative changes of the thoracolumbar   spine.  Bilateral hip and sacroiliac joint degenerative changes.  No   acute fracture.  No dislocation.    Soft tissues:  Bilateral fat-containing inguinal hernias.    Vasculature:  Normal caliber abdominal aorta with diffuse   atherosclerosis.  No abdominal aortic aneurysm.    Lymph nodes:  Unremarkable.  No enlarged lymph nodes.    IMPRESSION:       1.  Diffuse thickening of the distal transverse colon, descending colon,   sigmoid colon and rectum with adjacent stranding, consistent with   nonspecific colitis.  2.  Cholelithiasis.  3.  Mild diffuse bladder wall thickening which could reflect cystitis.    Correlate with urinalysis.      Impression:          Electronically signed by Robb Howard M.D. on 06-08-21 at 0532              No orders to display        Assessment/Plan     Active  Hospital Problems    Diagnosis  POA   • CORY (acute kidney injury) (CMS/[facility]) [N17.9]  Yes   • Clostridium difficile colitis [A04.72]  Yes   • History of diverticulitis [Z87.19]  Not Applicable   • Uncontrolled type 2 diabetes mellitus with hyperglycemia (CMS/[facility]) [E11.65]  Yes   • Chronic coronary artery disease [I25.10]  Yes   • Type 2 diabetes with nephropathy (CMS/[facility]) [E11.21]  Yes   • Stage 3b chronic kidney disease (CMS/[facility]) [N18.32]  Yes   • Benign essential hypertension [I10]  Yes      Resolved Hospital Problems   No resolved problems to display.       Mr. Pollard is a 65 y.o. who presents with 2 weeks of constipation followed by 5 days of diarrhea, anorexia, and abdominal pain. CT and pelvis with diffuse colitis with thickening of distal transvers, descending, sigmoid and rectum.   Patient started on IV Zosyn in the ER.  Labs consistent with OCRY on CKD 3B, leukocytosis but he is afebrile.  C. difficile colitis  · Stool study positive for C. Difficile  · Initiate oral vancomycin.  This is his first episode of C. difficile  · Okay to advance diet as tolerated to bland, low residue, consistent carb diet  · hgb declined no overt bleeding likely partially dilutional decline. Monitor for overt bleeding and check anemia studes.   · Patient has never had a colonoscopy. fter a significant episode of colitis advised that he have a colonoscopy in 6 to 8 weeks for follow up and screening purposes. No indication for GI consult at this time    CORY on CKD 3B  · Followed by Dr. Bravo.  Nephrology consulted.  · Creat 3.74 compared to baseline of 2.04 in January 2.04   · Lisinopril held. avoid NSAIDS/nephrotoxic agents and renally dose medications  · Daily BMP.  · Given suspected perenal azotemia, will continue IVF for volume expansion     CAD  · Followed by Dr. Luque  · Currently asymptomatic.  No evidence of CHF    · DM2  · Hold home regimen.  Correctional insulin.  Diabetic diet.  A1c is very good.    HTN  · Hold ACE  with CORY  · BP acceptable.     · I discussed the patient's findings and my recommendations with patient, nursing staff and Dr Velazquez.    VTE Prophylaxis - SCDs.  Code Status - Full code.       BERNADETTE Lantigua  [facility]  06/08/21  09:07 EDT

## 2021-06-08 NOTE — CONSULTS
[facility] CONSULT NOTE    Reason for Consultation: Acute kidney injury on CKD stage III    History of present illness:     is a very pleasant 65-year-old gentleman who was started having a poor appetite and significant diarrhea for about a week now.  He came to emergency room because of feeling drained and diarrhea not improving.  He was admitted and started on IV fluids.  C. difficile was sent and came back positive.  He has been started on oral vancomycin.  Serum creatinine at baseline runs about 1.6 to 2.0 mg/dL and is now up to 0.7 mg/dL  Patient denies lower extremity swelling.  Of note he takes lisinopril for blood pressure control at home.    Past Medical History:   Diagnosis Date   • Arthritis    • Diabetes mellitus (CMS/[facility])    • Diabetic eye exam (CMS/[facility]) 01/24/2017    DUE   • Hyperlipidemia    • Hypertension      Past Surgical History:   Procedure Laterality Date   • FINGER SURGERY Right     Fourth Digit.     Family History   Problem Relation Age of Onset   • Heart disease Mother    • Diabetes Mother    • Heart disease Father    • Hypertension Father    • Diabetes Father      Social History     Tobacco Use   • Smoking status: Never Smoker   • Smokeless tobacco: Never Used   Substance Use Topics   • Alcohol use: No   • Drug use: Not on file     Medications Prior to Admission   Medication Sig Dispense Refill Last Dose   • aspirin (aspirin) 81 MG EC tablet Take 81 mg by mouth Daily.   6/7/2021 at Unknown time   • atorvastatin (Lipitor) 40 MG tablet Take 1 tablet by mouth Daily. 90 tablet 1 6/7/2021 at Unknown time   • Blood Glucose Monitoring Suppl (ACCU-CHEK MELECIO PLUS) w/Device kit Use as directed to check BS daily E11.9 1 kit 0 6/7/2021 at Unknown time   • ezetimibe (Zetia) 10 MG tablet Take 1 tablet by mouth Daily. 90 tablet 1 6/7/2021 at Unknown time   • glimepiride (Amaryl) 2 MG tablet Take 1 tablet by mouth 2 (Two) Times a Day. 180 tablet 1 6/7/2021 at Unknown time   • glucose blood  "(Accu-Chek Jordyn Plus) test strip 1 each by Other route Daily. Use as instructed 100 each 3 6/7/2021 at Unknown time   • Insulin Pen Needle 32G X 4 MM misc Use  each 3 6/7/2021 at Unknown time   • Lancets (accu-chek soft touch) lancets Use as directed to test once daily E11.9 100 each 3 6/7/2021 at Unknown time   • Liraglutide (Victoza) 18 MG/3ML solution pen-injector injection Inject 1.8 mg under the skin into the appropriate area as directed Daily. 27 mL 1 6/7/2021 at Unknown time   • lisinopril (PRINIVIL,ZESTRIL) 40 MG tablet Take 1 tablet by mouth Daily. 90 tablet 1 6/7/2021 at Unknown time   • pioglitazone (ACTOS) 30 MG tablet Take 1 tablet by mouth Daily. 90 tablet 1 6/7/2021 at Unknown time   • HYDROcodone-acetaminophen (NORCO) 5-325 MG per tablet Take 1 tablet by mouth Every 6 (Six) Hours As Needed for Moderate Pain . 12 tablet 0    • metaxalone (SKELAXIN) 800 MG tablet Take 1 tablet by mouth 3 (Three) Times a Day. 45 tablet 0      Allergies:  Patient has no known allergies.    Review of Systems  14 point review of systems is otherwise negative except for mentioned above on HPI    Objective     Vital Signs  Temp:  [98.6 °F (37 °C)-99.1 °F (37.3 °C)] 99.1 °F (37.3 °C)  Heart Rate:  [] 83  Resp:  [18-19] 18  BP: ()/(51-84) 118/84    Flowsheet Rows      First Filed Value   Admission Height  182.9 cm (72.01\") Documented at 06/08/2021 0332   Admission Weight  118 kg (260 lb) Documented at 06/08/2021 0332           No intake/output data recorded.  I/O last 3 completed shifts:  In: 50 [IV Piggyback:50]  Out: -     Intake/Output Summary (Last 24 hours) at 6/8/2021 1345  Last data filed at 6/8/2021 0657  Gross per 24 hour   Intake 50 ml   Output --   Net 50 ml       Physical Exam:  General Appearance: alert, oriented x 3, no acute distress   Skin: warm and dry  HEENT: oral mucosa normal, nonicteric sclera  Neck: supple, no JVD  Lungs: CTA  Heart: RRR, normal S1 and S2  Abdomen: soft, nontender, " nondistended  : no palpable bladder  Extremities: no edema, cyanosis or clubbing  Neuro: normal speech and mental status     Results Review:  Results from last 7 days   Lab Units 06/08/21  0814 06/08/21  0125   SODIUM mmol/L 136 136   POTASSIUM mmol/L 4.4 4.2   CHLORIDE mmol/L 103 101   CO2 mmol/L 22.7 21.5*   BUN mg/dL 44* 41*   CREATININE mg/dL 3.73* 3.74*   CALCIUM mg/dL 8.4* 9.2   BILIRUBIN mg/dL  --  0.5   ALK PHOS U/L  --  64   ALT (SGPT) U/L  --  42*   AST (SGOT) U/L  --  32   GLUCOSE mg/dL 111* 90                 hydrocortisone-bacitracin-zinc oxide-nystatin, 1 application, Topical, BID  insulin lispro, 0-9 Units, Subcutaneous, 4x Daily With Meals & Nightly  vancomycin, 125 mg, Oral, Q6H      Pharmacy Consult,   sodium chloride, 100 mL/hr, Last Rate: 100 mL/hr (06/08/21 0948)        Assessment/Plan     ASSESSMENT:  1.  Acute kidney injury on CKD stage IIIb, likely along the spectrum of prerenal azotemia versus ATN in the setting of volume depletion related to ongoing diarrhea and poor p.o. intake  2.  C. difficile colitis, started on oral vancomycin  3.  History of hypertension now with relative hypotension  4.  CAD    PLAN:  1.  Agree with holding lisinopril and continuing with IV fluids with normal saline at 100 cc/h  2.  Agree with oral vancomycin  3.  Renal panel magnesium in a.m.  We will check urine sodium.  I expect renal function to start improving over the next 1 to 2 days    Thank you for involving us in the care of Mr Pollard.  We will continue to follow along.    Jennyfer Rebollar MD  06/08/21  13:45 EDT    [facility]  [phone]      Much of this encounter note is an electronic transcription/translation of spoken language to printed text. The electronic translation of spoken language may permit erroneous, or at times, nonsensical words or phrases to be inadvertently transcribed; Although I have reviewed the note for such errors, some may still exist.

## 2021-06-08 NOTE — PROGRESS NOTES
Discharge Planning Assessment  [facility]     Patient Name: Junior Pollard Jr.  MRN: [id number]  Today's Date: 6/8/2021    Admit Date: 6/8/2021    Discharge Needs Assessment     Row Name 06/08/21 8394       Living Environment    Lives With  spouse    Name(s) of Who Lives With Patient  Annabelle Hunt    Current Living Arrangements  home/apartment/condo    Primary Care Provided by  self    Family Caregiver if Needed  child(erich), adult;spouse    Family Caregiver Names  Spouse, Annabelle and DaughterMiesha    Quality of Family Relationships  helpful;involved;supportive    Able to Return to Prior Arrangements  yes       Resource/Environmental Concerns    Resource/Environmental Concerns  none       Transition Planning    Patient/Family Anticipates Transition to  home with family    Patient/Family Anticipated Services at Transition  none    Transportation Anticipated  family or friend will provide       Discharge Needs Assessment    Readmission Within the Last 30 Days  no previous admission in last 30 days    Equipment Currently Used at Home  cane, straight    Concerns to be Addressed  adjustment to diagnosis/illness    Equipment Needed After Discharge  none    Provided Post Acute Provider List?  N/A        Discharge Plan     Row Name 06/08/21 4798       Plan    Plan  Home w/ family    Patient/Family in Agreement with Plan  yes    Plan Comments  Met w/ patient and daughter at the bedside face sheet verfiied. Patient lives w/ spouse, he is IADL; uses a cane occasionally. PCP is Boogie Interiano and preferred pharmacy is [facility] [place]. Possible dc on PO vanc, pt is agreeable to use [facility] meds to bed for any new dc meds. CCP will follow  for dc needs and med costs.        Continued Care and Services - Admitted Since 6/8/2021    Coordination has not been started for this encounter.         Demographic Summary    No documentation.       Functional Status     Row Name 06/08/21 6406       Functional Status     Usual Activity Tolerance  good       Functional Status, IADL    Medications  independent    Meal Preparation  independent    Housekeeping  independent    Shopping  independent       Mental Status    General Appearance WDL  WDL       Mental Status Summary    Recent Changes in Mental Status/Cognitive Functioning  no changes        Psychosocial    No documentation.       Abuse/Neglect    No documentation.       Legal    No documentation.       Substance Abuse    No documentation.       Patient Forms    No documentation.           Lina Amezquita RN

## 2021-06-08 NOTE — ED PROVIDER NOTES
EMERGENCY DEPARTMENT ENCOUNTER    Room Number:  10/10  PCP: Boogie Interiano MD  Historian: Patient  History Limited By: Nothing      HPI  Chief Complaint: Abdominal pain diarrhea  Context: Junior Pollard Jr. is a 65 y.o. male who presents to the ED c/o abdominal pain diarrhea.  Patient states he was constipated about 2 weeks ago.  Over the last week he has had diarrhea.  Has approximately 4 episodes of diarrhea a day.  Had no bleeding.  Had lower abdominal pain.  Patient was seen in urgent care and had x-ray that showed no obstruction.  Patient states symptoms persist.      Location: Lower abdomen  Radiation: None  Character: Aching  Duration: 1 week  Severity: Moderate  Progression: Not improving  Aggravating Factors: Movement  Alleviating Factors: Nothing        MEDICAL RECORD REVIEW    Patient with history of diabetes          PAST MEDICAL HISTORY  Active Ambulatory Problems     Diagnosis Date Noted   • Abnormal cardiovascular stress test 03/28/2016   • Multiple actinic keratoses 03/28/2016   • Chronic coronary artery disease 03/28/2016   • Benign essential hypertension 03/28/2016   • Chronic kidney disease, stage III (moderate) (CMS/[facility]) 03/28/2016   • Diabetes mellitus with renal complications (CMS/[facility]) 03/28/2016   • Hypercholesterolemia 03/28/2016   • Elevated creatine kinase level 03/28/2016   • Onychomycosis of toenail 03/28/2016   • Osteoarthritis 03/28/2016   • Proteinuria 03/28/2016   • Type 2 diabetes with nephropathy (CMS/[facility]) 03/28/2016   • Acute diverticulitis 10/09/2020   • Diarrhea 10/09/2020   • Fever 10/09/2020   • Lower abdominal pain 10/09/2020   • Uncontrolled type 2 diabetes mellitus with hyperglycemia (CMS/[facility]) 10/09/2020     Resolved Ambulatory Problems     Diagnosis Date Noted   • No Resolved Ambulatory Problems     Past Medical History:   Diagnosis Date   • Arthritis    • Diabetes mellitus (CMS/[facility])    • Diabetic eye exam (CMS/[facility]) 01/24/2017   • Hyperlipidemia    • Hypertension           PAST SURGICAL HISTORY  Past Surgical History:   Procedure Laterality Date   • FINGER SURGERY Right     Fourth Digit.         FAMILY HISTORY  Family History   Problem Relation Age of Onset   • Heart disease Mother    • Diabetes Mother    • Heart disease Father    • Hypertension Father    • Diabetes Father          SOCIAL HISTORY  Social History     Socioeconomic History   • Marital status:      Spouse name: Not on file   • Number of children: Not on file   • Years of education: Not on file   • Highest education level: Not on file   Tobacco Use   • Smoking status: Never Smoker   • Smokeless tobacco: Never Used   Substance and Sexual Activity   • Alcohol use: No         ALLERGIES  Patient has no known allergies.        REVIEW OF SYSTEMS  Review of Systems   Constitutional: Negative for activity change, appetite change and fever.   HENT: Negative for congestion and sore throat.    Eyes: Negative.    Respiratory: Negative for cough and shortness of breath.    Cardiovascular: Negative for chest pain and leg swelling.   Gastrointestinal: Positive for abdominal pain and diarrhea. Negative for vomiting.   Endocrine: Negative.    Genitourinary: Negative for decreased urine volume and dysuria.   Musculoskeletal: Negative for neck pain.   Skin: Negative for rash and wound.   Allergic/Immunologic: Negative.    Neurological: Negative for weakness, numbness and headaches.   Hematological: Negative.    Psychiatric/Behavioral: Negative.    All other systems reviewed and are negative.           PHYSICAL EXAM  ED Triage Vitals [06/07/21 2128]   Temp Heart Rate Resp BP SpO2   98.9 °F (37.2 °C) (!) 124 19 -- 98 %      Temp src Heart Rate Source Patient Position BP Location FiO2 (%)   Tympanic -- -- -- --       Physical Exam  Vitals and nursing note reviewed.   Constitutional:       General: He is not in acute distress.  HENT:      Head: Normocephalic and atraumatic.   Eyes:      Pupils: Pupils are equal, round, and  reactive to light.   Cardiovascular:      Rate and Rhythm: Normal rate and regular rhythm.      Heart sounds: Normal heart sounds.   Pulmonary:      Effort: Pulmonary effort is normal. No respiratory distress.      Breath sounds: Normal breath sounds.   Abdominal:      Palpations: Abdomen is soft.      Tenderness: There is abdominal tenderness. There is no guarding or rebound.   Musculoskeletal:         General: Normal range of motion.      Cervical back: Normal range of motion and neck supple.   Skin:     General: Skin is warm and dry.   Neurological:      Mental Status: He is alert and oriented to person, place, and time.      Sensory: Sensation is intact. No sensory deficit.      Motor: No weakness.   Psychiatric:         Mood and Affect: Mood and affect normal.       Patient was wearing a face mask when I entered the room and they continued to wear a mask throughout their stay in the ED.  I wore PPE, including  gloves, face mask with shield or face mask with goggles whenever I was in the room with patient.       LAB RESULTS  Recent Results (from the past 24 hour(s))   Comprehensive Metabolic Panel    Collection Time: 06/08/21  1:25 AM    Specimen: Blood   Result Value Ref Range    Glucose 90 65 - 99 mg/dL    BUN 41 (H) 8 - 23 mg/dL    Creatinine 3.74 (H) 0.76 - 1.27 mg/dL    Sodium 136 136 - 145 mmol/L    Potassium 4.2 3.5 - 5.2 mmol/L    Chloride 101 98 - 107 mmol/L    CO2 21.5 (L) 22.0 - 29.0 mmol/L    Calcium 9.2 8.6 - 10.5 mg/dL    Total Protein 7.0 6.0 - 8.5 g/dL    Albumin 3.90 3.50 - 5.20 g/dL    ALT (SGPT) 42 (H) 1 - 41 U/L    AST (SGOT) 32 1 - 40 U/L    Alkaline Phosphatase 64 39 - 117 U/L    Total Bilirubin 0.5 0.0 - 1.2 mg/dL    eGFR Non African Amer 16 (L) >60 mL/min/1.73    Globulin 3.1 gm/dL    A/G Ratio 1.3 g/dL    BUN/Creatinine Ratio 11.0 7.0 - 25.0    Anion Gap 13.5 5.0 - 15.0 mmol/L   Lipase    Collection Time: 06/08/21  1:25 AM    Specimen: Blood   Result Value Ref Range    Lipase 20 13 - 60  U/L   Light Blue Top    Collection Time: 06/08/21  1:25 AM   Result Value Ref Range    Extra Tube hold for add-on    Green Top (Gel)    Collection Time: 06/08/21  1:25 AM   Result Value Ref Range    Extra Tube Hold for add-ons.    Lavender Top    Collection Time: 06/08/21  1:25 AM   Result Value Ref Range    Extra Tube hold for add-on    Gold Top - SST    Collection Time: 06/08/21  1:25 AM   Result Value Ref Range    Extra Tube Hold for add-ons.    CBC Auto Differential    Collection Time: 06/08/21  1:25 AM    Specimen: Blood   Result Value Ref Range    WBC 15.93 (H) 3.40 - 10.80 10*3/mm3    RBC 3.93 (L) 4.14 - 5.80 10*6/mm3    Hemoglobin 12.3 (L) 13.0 - 17.7 g/dL    Hematocrit 35.7 (L) 37.5 - 51.0 %    MCV 90.8 79.0 - 97.0 fL    MCH 31.3 26.6 - 33.0 pg    MCHC 34.5 31.5 - 35.7 g/dL    RDW 12.4 12.3 - 15.4 %    RDW-SD 40.5 37.0 - 54.0 fl    MPV 10.1 6.0 - 12.0 fL    Platelets 311 140 - 450 10*3/mm3    Neutrophil % 77.8 (H) 42.7 - 76.0 %    Lymphocyte % 9.4 (L) 19.6 - 45.3 %    Monocyte % 11.5 5.0 - 12.0 %    Eosinophil % 0.3 0.3 - 6.2 %    Basophil % 0.6 0.0 - 1.5 %    Immature Grans % 0.4 0.0 - 0.5 %    Neutrophils, Absolute 12.39 (H) 1.70 - 7.00 10*3/mm3    Lymphocytes, Absolute 1.50 0.70 - 3.10 10*3/mm3    Monocytes, Absolute 1.83 (H) 0.10 - 0.90 10*3/mm3    Eosinophils, Absolute 0.05 0.00 - 0.40 10*3/mm3    Basophils, Absolute 0.09 0.00 - 0.20 10*3/mm3    Immature Grans, Absolute 0.07 (H) 0.00 - 0.05 10*3/mm3    nRBC 0.0 0.0 - 0.2 /100 WBC   Urinalysis With Microscopic If Indicated (No Culture) - Urine, Clean Catch    Collection Time: 06/08/21  1:26 AM    Specimen: Urine, Clean Catch   Result Value Ref Range    Color, UA Dark Yellow (A) Yellow, Straw    Appearance, UA Cloudy (A) Clear    pH, UA <=5.0 5.0 - 8.0    Specific Gravity, UA 1.025 1.005 - 1.030    Glucose, UA Negative Negative    Ketones, UA 15 mg/dL (1+) (A) Negative    Bilirubin, UA Negative Negative    Blood, UA Negative Negative    Protein, UA Trace  (A) Negative    Leuk Esterase, UA Trace (A) Negative    Nitrite, UA Negative Negative    Urobilinogen, UA 1.0 E.U./dL 0.2 - 1.0 E.U./dL   Urinalysis, Microscopic Only - Urine, Clean Catch    Collection Time: 06/08/21  1:26 AM    Specimen: Urine, Clean Catch   Result Value Ref Range    RBC, UA None Seen None Seen, 0-2 /HPF    WBC, UA 3-5 (A) None Seen, 0-2 /HPF    Bacteria, UA None Seen None Seen /HPF    Squamous Epithelial Cells, UA 0-2 None Seen, 0-2 /HPF    Hyaline Casts, UA 0-2 None Seen /LPF    Methodology Manual Light Microscopy        Ordered the above labs and reviewed the results.        RADIOLOGY  CT Abdomen Pelvis Without Contrast   Final Result         Electronically signed by Robb Howard M.D. on 06-08-21 at 0532           Ordered the above noted radiological studies. Reviewed by me in PACS.          PROCEDURES  Procedures          MEDICATIONS GIVEN IN ER  Medications   sodium chloride 0.9 % flush 10 mL (has no administration in time range)   piperacillin-tazobactam (ZOSYN) 3.375 g in iso-osmotic dextrose 50 ml (premix) (3.375 g Intravenous New Bag 6/8/21 0615)   sodium chloride 0.9 % flush 10 mL (has no administration in time range)   sodium chloride 0.9 % flush 10 mL (has no administration in time range)   nitroglycerin (NITROSTAT) SL tablet 0.4 mg (has no administration in time range)   sodium chloride 0.9 % infusion (has no administration in time range)   acetaminophen (TYLENOL) tablet 650 mg (has no administration in time range)     Or   acetaminophen (TYLENOL) 160 MG/5ML solution 650 mg (has no administration in time range)     Or   acetaminophen (TYLENOL) suppository 650 mg (has no administration in time range)   ondansetron (ZOFRAN) tablet 4 mg (has no administration in time range)     Or   ondansetron (ZOFRAN) injection 4 mg (has no administration in time range)   calcium carbonate (TUMS) chewable tablet 500 mg (200 mg elemental) (has no administration in time range)   Pharmacy to Dose Zosyn (has  no administration in time range)   HYDROmorphone (DILAUDID) injection 0.5 mg (has no administration in time range)   sodium chloride 0.9 % bolus 1,000 mL (1,000 mL Intravenous New Bag 6/8/21 0315)   HYDROmorphone (DILAUDID) injection 0.5 mg (0.5 mg Intravenous Given 6/8/21 0323)   ondansetron (ZOFRAN) injection 4 mg (4 mg Intravenous Given 6/8/21 0323)             PROGRESS AND CONSULTS  ED Course as of Jun 08 0631 Tue Jun 08, 2021   0558 05:58 EDT  Patient presents for diarrhea and appears to have diffuse colitis.  Patient's renal failure is worse.  Has been given fluids.  Will be given antibiotics for colitis.  Patient discussed with Kyra and will be admitted to [facility]    [SL]      ED Course User Index  [SL] Patrice Sterling MD           MEDICAL DECISION MAKING      MDM  Number of Diagnoses or Management Options     Amount and/or Complexity of Data Reviewed  Clinical lab tests: reviewed and ordered (Creatinine 3.7)  Tests in the radiology section of CPT®: reviewed and ordered (Diffuse colitis)  Decide to obtain previous medical records or to obtain history from someone other than the patient: yes  Discuss the patient with other providers: yes (Discussed with Kyra with A who will admit to Dr. Velazquez.)               DIAGNOSIS  Final diagnoses:   Colitis   Renal insufficiency           DISPOSITION  admit        Latest Documented Vital Signs:  As of 06:31 EDT  BP- 112/52 HR- 76 Temp- 98.9 °F (37.2 °C) (Tympanic) O2 sat- 94%                       Patrice Sterling MD  06/08/21 0631

## 2021-06-08 NOTE — PROGRESS NOTES
"Pharmacy Consult - PO Vancomycin  Amanda Ross, BERNADETTE requested Pharmacy to \"Change proton pump inhibitors (PPIs) to famotidine unless documented or history of GI bleed or to discontinue antiperistalic agents and stool softeners/laxatives.\"    Vancomycin 125 mg PO q 6 h x 10 days ordered for first Clostridium difficile episode.  6/8 C.diff toxin screen: positive    Currently no active medication orders for PPIs or antiperistaltic agents/stool softeners/laxatives. Pharmacy will continue to follow and adjust as needed.    Jennifer Colón, PharmD  Pharmacy Resident   "

## 2021-06-08 NOTE — ED NOTES
Pt to ED triage c/o nausea and diarrhea. Pt states it has occurred for over 1 week and has not gotten any better. Pt rates pain as 6/10 and in NAD at this time.      Patient was placed in face mask during first look triage.  Patient was wearing a face mask throughout encounter.  I wore personal protective equipment throughout the encounter.  Hand hygiene was performed before and after patient encounter.      Henok Valladares RN  06/07/21 1018

## 2021-06-09 ENCOUNTER — APPOINTMENT (OUTPATIENT)
Dept: ULTRASOUND IMAGING | Facility: HOSPITAL | Age: 66
End: 2021-06-09

## 2021-06-09 LAB
ALBUMIN SERPL-MCNC: 2.7 G/DL (ref 3.5–5.2)
ANION GAP SERPL CALCULATED.3IONS-SCNC: 7.1 MMOL/L (ref 5–15)
BUN SERPL-MCNC: 47 MG/DL (ref 8–23)
BUN/CREAT SERPL: 15.7 (ref 7–25)
CALCIUM SPEC-SCNC: 8 MG/DL (ref 8.6–10.5)
CHLORIDE SERPL-SCNC: 106 MMOL/L (ref 98–107)
CK SERPL-CCNC: 55 U/L (ref 20–200)
CO2 SERPL-SCNC: 21.9 MMOL/L (ref 22–29)
CREAT SERPL-MCNC: 2.99 MG/DL (ref 0.76–1.27)
DEPRECATED RDW RBC AUTO: 43.1 FL (ref 37–54)
ERYTHROCYTE [DISTWIDTH] IN BLOOD BY AUTOMATED COUNT: 12.5 % (ref 12.3–15.4)
GFR SERPL CREATININE-BSD FRML MDRD: 21 ML/MIN/1.73
GLUCOSE BLDC GLUCOMTR-MCNC: 128 MG/DL (ref 70–130)
GLUCOSE BLDC GLUCOMTR-MCNC: 170 MG/DL (ref 70–130)
GLUCOSE BLDC GLUCOMTR-MCNC: 172 MG/DL (ref 70–130)
GLUCOSE BLDC GLUCOMTR-MCNC: 266 MG/DL (ref 70–130)
GLUCOSE SERPL-MCNC: 87 MG/DL (ref 65–99)
HCT VFR BLD AUTO: 30.7 % (ref 37.5–51)
HGB BLD-MCNC: 10.2 G/DL (ref 13–17.7)
MAGNESIUM SERPL-MCNC: 1.7 MG/DL (ref 1.6–2.4)
MCH RBC QN AUTO: 30.9 PG (ref 26.6–33)
MCHC RBC AUTO-ENTMCNC: 33.2 G/DL (ref 31.5–35.7)
MCV RBC AUTO: 93 FL (ref 79–97)
PHOSPHATE SERPL-MCNC: 3 MG/DL (ref 2.5–4.5)
PLATELET # BLD AUTO: 261 10*3/MM3 (ref 140–450)
PMV BLD AUTO: 10.3 FL (ref 6–12)
POTASSIUM SERPL-SCNC: 4 MMOL/L (ref 3.5–5.2)
RBC # BLD AUTO: 3.3 10*6/MM3 (ref 4.14–5.8)
SODIUM SERPL-SCNC: 135 MMOL/L (ref 136–145)
WBC # BLD AUTO: 16.56 10*3/MM3 (ref 3.4–10.8)

## 2021-06-09 PROCEDURE — 82550 ASSAY OF CK (CPK): CPT | Performed by: INTERNAL MEDICINE

## 2021-06-09 PROCEDURE — 80069 RENAL FUNCTION PANEL: CPT | Performed by: INTERNAL MEDICINE

## 2021-06-09 PROCEDURE — G0378 HOSPITAL OBSERVATION PER HR: HCPCS

## 2021-06-09 PROCEDURE — 76705 ECHO EXAM OF ABDOMEN: CPT

## 2021-06-09 PROCEDURE — 82962 GLUCOSE BLOOD TEST: CPT

## 2021-06-09 PROCEDURE — 83735 ASSAY OF MAGNESIUM: CPT | Performed by: INTERNAL MEDICINE

## 2021-06-09 PROCEDURE — 63710000001 INSULIN LISPRO (HUMAN) PER 5 UNITS: Performed by: NURSE PRACTITIONER

## 2021-06-09 PROCEDURE — 85027 COMPLETE CBC AUTOMATED: CPT | Performed by: HOSPITALIST

## 2021-06-09 RX ORDER — FERROUS SULFATE 325(65) MG
325 TABLET ORAL
Status: DISCONTINUED | OUTPATIENT
Start: 2021-06-09 | End: 2021-06-14 | Stop reason: HOSPADM

## 2021-06-09 RX ORDER — HYDROCODONE BITARTRATE AND ACETAMINOPHEN 5; 325 MG/1; MG/1
1 TABLET ORAL EVERY 6 HOURS PRN
Status: DISCONTINUED | OUTPATIENT
Start: 2021-06-09 | End: 2021-06-14 | Stop reason: HOSPADM

## 2021-06-09 RX ADMIN — INSULIN LISPRO 2 UNITS: 100 INJECTION, SOLUTION INTRAVENOUS; SUBCUTANEOUS at 12:16

## 2021-06-09 RX ADMIN — ACETAMINOPHEN 650 MG: 325 TABLET, FILM COATED ORAL at 09:16

## 2021-06-09 RX ADMIN — INSULIN LISPRO 2 UNITS: 100 INJECTION, SOLUTION INTRAVENOUS; SUBCUTANEOUS at 20:59

## 2021-06-09 RX ADMIN — INSULIN LISPRO 6 UNITS: 100 INJECTION, SOLUTION INTRAVENOUS; SUBCUTANEOUS at 17:33

## 2021-06-09 RX ADMIN — ATORVASTATIN CALCIUM 40 MG: 20 TABLET, FILM COATED ORAL at 09:16

## 2021-06-09 RX ADMIN — HYOSCYAMINE SULFATE 125 MCG: 0.12 TABLET, ORALLY DISINTEGRATING ORAL at 15:13

## 2021-06-09 RX ADMIN — HYDROCODONE BITARTRATE AND ACETAMINOPHEN 1 TABLET: 5; 325 TABLET ORAL at 18:01

## 2021-06-09 RX ADMIN — VANCOMYCIN HYDROCHLORIDE 125 MG: 125 CAPSULE ORAL at 06:14

## 2021-06-09 RX ADMIN — SODIUM CHLORIDE 100 ML/HR: 9 INJECTION, SOLUTION INTRAVENOUS at 14:36

## 2021-06-09 RX ADMIN — ASPIRIN 81 MG: 81 TABLET, FILM COATED ORAL at 09:16

## 2021-06-09 RX ADMIN — FERROUS SULFATE TAB 325 MG (65 MG ELEMENTAL FE) 325 MG: 325 (65 FE) TAB at 12:15

## 2021-06-09 RX ADMIN — VANCOMYCIN HYDROCHLORIDE 125 MG: 125 CAPSULE ORAL at 17:32

## 2021-06-09 RX ADMIN — VANCOMYCIN HYDROCHLORIDE 125 MG: 125 CAPSULE ORAL at 12:16

## 2021-06-09 NOTE — PROGRESS NOTES
"   LOS: 1 day    Patient Care Team:  Boogie Interiano MD as PCP - General (Family Medicine)  Gerald Bravo MD as Consulting Physician (Nephrology)    Chief Complaint:    Chief Complaint   Patient presents with   • Diarrhea   • Nausea     Follow UP Lida on CKD 3  Subjective     Interval History:   Feels overall better, but more abdominal pain today.   Passing more gas than liquid stools.  Able to eat without immediately having to go to Bathroom.  Urinating a lot, clear yellow urine. On IVF .    Review of Systems:   As noted above    Objective     Vital Signs  Temp:  [98.2 °F (36.8 °C)-98.9 °F (37.2 °C)] 98.6 °F (37 °C)  Heart Rate:  [77-85] 85  Resp:  [18] 18  BP: ()/(40-55) 111/54    Flowsheet Rows      First Filed Value   Admission Height  182.9 cm (72.01\") Documented at 06/08/2021 0332   Admission Weight  118 kg (260 lb) Documented at 06/08/2021 0332          I/O this shift:  In: 560 [P.O.:560]  Out: -   I/O last 3 completed shifts:  In: 2650 [P.O.:580; I.V.:2020; IV Piggyback:50]  Out: -     Intake/Output Summary (Last 24 hours) at 6/9/2021 1344  Last data filed at 6/9/2021 1314  Gross per 24 hour   Intake 3160 ml   Output --   Net 3160 ml       Physical Exam:  General Appearance: alert, oriented x 3, no acute distress,   Skin: warm and dry  HEENT: pupils round and reactive to light, oral mucosa normal, nonicteric sclera. Mask applied .  Neck: supple, no JVD, trachea midline  Lungs: Clear to auscultation. No wheezing .  Heart: RRR, normal S1 and S2, no S3, no rub  Abdomen: soft, nontender, distended, + bs, no body wall edema .  Extremities: trace lower ext.  Edema.  Neuro: normal speech and mental status       Results Review:    Results from last 7 days   Lab Units 06/09/21  0538 06/08/21  0814 06/08/21  0125   SODIUM mmol/L 135* 136 136   POTASSIUM mmol/L 4.0 4.4 4.2   CHLORIDE mmol/L 106 103 101   CO2 mmol/L 21.9* 22.7 21.5*   BUN mg/dL 47* 44* 41*   CREATININE mg/dL 2.99* 3.73* 3.74*   CALCIUM " mg/dL 8.0* 8.4* 9.2   BILIRUBIN mg/dL  --   --  0.5   ALK PHOS U/L  --   --  64   ALT (SGPT) U/L  --   --  42*   AST (SGOT) U/L  --   --  32   GLUCOSE mg/dL 87 111* 90       Estimated Creatinine Clearance: 31.1 mL/min (A) (by C-G formula based on SCr of 2.99 mg/dL (H)).    Results from last 7 days   Lab Units 06/09/21  0538   MAGNESIUM mg/dL 1.7   PHOSPHORUS mg/dL 3.0             Results from last 7 days   Lab Units 06/09/21  0538 06/08/21  0814 06/08/21  0125   WBC 10*3/mm3 16.56* 15.42* 15.93*   HEMOGLOBIN g/dL 10.2* 10.9* 12.3*   PLATELETS 10*3/mm3 261 272 311               Imaging Results (Last 24 Hours)     ** No results found for the last 24 hours. **        aspirin, 81 mg, Oral, Daily  atorvastatin, 40 mg, Oral, Daily  ferrous sulfate, 325 mg, Oral, Daily With Breakfast  hydrocortisone-bacitracin-zinc oxide-nystatin, 1 application, Topical, BID  insulin lispro, 0-9 Units, Subcutaneous, 4x Daily With Meals & Nightly  vancomycin, 125 mg, Oral, Q6H      Pharmacy Consult,   sodium chloride, 100 mL/hr, Last Rate: 100 mL/hr (06/08/21 0948)        Medication Review:   Current Facility-Administered Medications   Medication Dose Route Frequency Provider Last Rate Last Admin   • acetaminophen (TYLENOL) tablet 650 mg  650 mg Oral Q4H PRN Tierra Pearson APRN   650 mg at 06/09/21 0916   • aspirin EC tablet 81 mg  81 mg Oral Daily Amanda Ross APRN   81 mg at 06/09/21 0916   • atorvastatin (LIPITOR) tablet 40 mg  40 mg Oral Daily Amanda Ross APRN   40 mg at 06/09/21 0916   • dextrose (D50W) 25 g/ 50mL Intravenous Solution 25 g  25 g Intravenous Q15 Min PRN Amanda Ross APRN       • dextrose (GLUTOSE) oral gel 15 g  15 g Oral Q15 Min PRN Amanda Ross APRN       • ferrous sulfate tablet 325 mg  325 mg Oral Daily With Breakfast Amanda Ross APRN   325 mg at 06/09/21 1215   • glucagon (human recombinant) (GLUCAGEN DIAGNOSTIC) injection 1 mg  1 mg Subcutaneous Q15 Min PRN Cody  BERNADETTE Mccartney       • hydrocortisone-bacitracin-zinc oxide-nystatin (MAGIC BARRIER) ointment 1 application  1 application Topical BID Henok Velazquez MD       • insulin lispro (ADMELOG) injection 0-9 Units  0-9 Units Subcutaneous 4x Daily With Meals & Nightly Amanda Ross APRN   2 Units at 06/09/21 1216   • nitroglycerin (NITROSTAT) SL tablet 0.4 mg  0.4 mg Sublingual Q5 Min PRN Tierra Pearson APRN       • ondansetron (ZOFRAN) tablet 4 mg  4 mg Oral Q6H PRN Tierra Pearson APRN        Or   • ondansetron (ZOFRAN) injection 4 mg  4 mg Intravenous Q6H PRN Tierra Pearson APRN       • Pharmacy Consult   Does not apply Continuous PRN Amanda Ross APRN       • sodium chloride 0.9 % infusion  100 mL/hr Intravenous Continuous Susanna Perez  mL/hr at 06/08/21 0948 100 mL/hr at 06/08/21 0948   • vancomycin (VANCOCIN) capsule 125 mg  125 mg Oral Q6H Amanda Ross APRN   125 mg at 06/09/21 1216       Assessment/Plan   1. Lida on CKD III, baseline 1.6-2. . Likely hypovolemic, hypotension . Waste products improved. Excellent uop .  Off ACE inhibitor for now.  Continue IVF another few hours, then dc. Strict I and O,  Daily weight.   2. C. Dif colitis.  PO vanc. Less frequent, but having more abdominal pain today. WBC stagnant .  3. History of HTN.  Too controlled. Off lisinopril for now .  4. DM2  5. Anemia . Chronic disease.         Susanna Perez MD  06/09/21  13:44 EDT

## 2021-06-09 NOTE — PLAN OF CARE
Goal Outcome Evaluation:           Progress: improving  Outcome Summary: VSS, A&Ox4, no c/o pain, + sepsis, ivf 100/hr, up ad noam, loose stool x2 pt reports improvement in abd discomfort compared to earlier shift, PO vanc, diet advanced, ctm

## 2021-06-09 NOTE — PROGRESS NOTES
Name: Junior Pollard Jr. ADMIT: 2021   : 1955  PCP: Boogie Interiano MD    MRN: [id number] LOS: 1 days   AGE/SEX: 65 y.o. male  ROOM: [facility]     Subjective   Subjective   Frequent very small watery stool but overall amount and frequency is better.  He still having generalized abdominal pain with cramping.  Denies nausea or vomiting and tolerating regular diet.  According to daughter at bedside patient is complained of postprandial right upper quadrant pain indigestion and nausea for several months.     Review of Systems denies CP, SOB, fever chills, rash     Objective   Objective   Vital Signs  Temp:  [98.2 °F (36.8 °C)-99.1 °F (37.3 °C)] 98.6 °F (37 °C)  Heart Rate:  [77-85] 85  Resp:  [18] 18  BP: ()/(40-84) 111/54  SpO2:  [95 %-98 %] 95 %  on   ;   Device (Oxygen Therapy): room air  Body mass index is 33.9 kg/m².  Physical Exam  Vitals reviewed.   Constitutional:       Appearance: He is well-developed. He is obese. He is not ill-appearing.   HENT:      Head: Normocephalic and atraumatic.   Cardiovascular:      Rate and Rhythm: Normal rate and regular rhythm.      Pulses: Normal pulses.   Pulmonary:      Effort: Pulmonary effort is normal. No respiratory distress.      Breath sounds: Normal breath sounds.   Abdominal:      General: Bowel sounds are normal. There is distension.      Palpations: Abdomen is soft. There is no mass.      Tenderness: There is abdominal tenderness.      Hernia: No hernia is present.   Skin:     General: Skin is warm and dry.   Neurological:      General: No focal deficit present.      Mental Status: He is alert and oriented to person, place, and time.   Psychiatric:         Behavior: Behavior normal.         Thought Content: Thought content normal.         Judgment: Judgment normal.         Results Review     I reviewed the patient's new clinical results.  Results from last 7 days   Lab Units 21  0538 21  0814 21  0125   WBC 10*3/mm3 16.56*  15.42* 15.93*   HEMOGLOBIN g/dL 10.2* 10.9* 12.3*   PLATELETS 10*3/mm3 261 272 311     Results from last 7 days   Lab Units 06/09/21  0538 06/08/21  0814 06/08/21  0125   SODIUM mmol/L 135* 136 136   POTASSIUM mmol/L 4.0 4.4 4.2   CHLORIDE mmol/L 106 103 101   CO2 mmol/L 21.9* 22.7 21.5*   BUN mg/dL 47* 44* 41*   CREATININE mg/dL 2.99* 3.73* 3.74*   GLUCOSE mg/dL 87 111* 90   Estimated Creatinine Clearance: 31.1 mL/min (A) (by C-G formula based on SCr of 2.99 mg/dL (H)).  Results from last 7 days   Lab Units 06/09/21 0538 06/08/21  0125   ALBUMIN g/dL 2.70* 3.90   BILIRUBIN mg/dL  --  0.5   ALK PHOS U/L  --  64   AST (SGOT) U/L  --  32   ALT (SGPT) U/L  --  42*     Results from last 7 days   Lab Units 06/09/21 0538 06/08/21  0814 06/08/21  0125   CALCIUM mg/dL 8.0* 8.4* 9.2   ALBUMIN g/dL 2.70*  --  3.90   MAGNESIUM mg/dL 1.7  --   --    PHOSPHORUS mg/dL 3.0  --   --        COVID19   Date Value Ref Range Status   06/08/2021 Not Detected Not Detected - Ref. Range Final     Hemoglobin A1C   Date/Time Value Ref Range Status   06/08/2021 0814 5.40 4.80 - 5.60 % Final     Glucose   Date/Time Value Ref Range Status   06/09/2021 1105 170 (H) 70 - 130 mg/dL Final   06/09/2021 0614 128 70 - 130 mg/dL Final   06/08/2021 1932 246 (H) 70 - 130 mg/dL Final   06/08/2021 1618 208 (H) 70 - 130 mg/dL Final   06/08/2021 1133 113 70 - 130 mg/dL Final       CT Abdomen Pelvis Without Contrast  Narrative: Patient: ISIS LEZAMA  Time Out: 05:32  Exam(s): CT ABDOMEN + PELVIS Without Contrast     EXAM:    CT Abdomen and Pelvis Without Intravenous Contrast    CLINICAL HISTORY:     Reason for exam: ab pain.    TECHNIQUE:    Axial computed tomography images of the abdomen and pelvis without   intravenous contrast.  CTDI is 26.40 mGy and DLP is 1458.00 mGy-cm.  This   CT exam was performed according to the principle of ALARA (As Low As   Reasonably Achievable) by using one or more of the following dose   reduction techniques: automated  exposure control, adjustment of the mA   and or kV according to patient size, and or use of iterative   reconstruction technique.    COMPARISON:    No relevant prior studies available.    FINDINGS:    Lung bases:  Calcified left basilar granuloma.  Mild bibasilar   atelectasis.     ABDOMEN:    Liver:  Unremarkable.    Gallbladder and bile ducts:  Several calcified gallstones.  No biliary   dilatation.    Pancreas:  Unremarkable.  No ductal dilation.    Spleen:  Unremarkable.  No splenomegaly.    Adrenals:  Unremarkable.  No mass.    Kidneys and ureters:  Bilateral renal parapelvic cysts.  No renal or   ureteral calculus.  No hydronephrosis.    Stomach and bowel:  Diffuse thickening of the distal transverse colon,   descending colon, sigmoid colon and rectum with adjacent stranding.    There is also mild presacral fluid stranding.  No obstruction.     PELVIS:    Appendix:  Unremarkable appendix.    Bladder:  Mild diffuse bladder wall thickening.  No stones.    Reproductive:  Mild central prostate calcifications.     ABDOMEN and PELVIS:    Intraperitoneal space:  Trace free fluid in the lower pelvis.  No   loculated fluid collection.  No free air.    Bones joints:  Multilevel degenerative changes of the thoracolumbar   spine.  Bilateral hip and sacroiliac joint degenerative changes.  No   acute fracture.  No dislocation.    Soft tissues:  Bilateral fat-containing inguinal hernias.    Vasculature:  Normal caliber abdominal aorta with diffuse   atherosclerosis.  No abdominal aortic aneurysm.    Lymph nodes:  Unremarkable.  No enlarged lymph nodes.    IMPRESSION:       1.  Diffuse thickening of the distal transverse colon, descending colon,   sigmoid colon and rectum with adjacent stranding, consistent with   nonspecific colitis.  2.  Cholelithiasis.  3.  Mild diffuse bladder wall thickening which could reflect cystitis.    Correlate with urinalysis.  Impression: Electronically signed by Robb Howard M.D. on 06-08-21 at  0532    Scheduled Medications  aspirin, 81 mg, Oral, Daily  atorvastatin, 40 mg, Oral, Daily  hydrocortisone-bacitracin-zinc oxide-nystatin, 1 application, Topical, BID  insulin lispro, 0-9 Units, Subcutaneous, 4x Daily With Meals & Nightly  vancomycin, 125 mg, Oral, Q6H    Infusions  Pharmacy Consult,   sodium chloride, 100 mL/hr, Last Rate: 100 mL/hr (06/08/21 0948)    Diet  Diet Regular; Cardiac, Parke, Consistent Carbohydrate, Low Fiber / Low Residue       Assessment/Plan     Active Hospital Problems    Diagnosis  POA   • **Clostridium difficile colitis [A04.72]  Yes   • CORY (acute kidney injury) (CMS/Formerly [facility]) [N17.9]  Yes   • History of diverticulitis [Z87.19]  Not Applicable   • Uncontrolled type 2 diabetes mellitus with hyperglycemia (CMS/[facility]) [E11.65]  Yes   • Chronic coronary artery disease [I25.10]  Yes   • Type 2 diabetes with nephropathy (CMS/[facility]) [E11.21]  Yes   • Stage 3b chronic kidney disease (CMS/Formerly [facility]) [N18.32]  Yes   • Benign essential hypertension [I10]  Yes      Resolved Hospital Problems   No resolved problems to display.       65 y.o. male admitted with Clostridium difficile colitis.     is a 65 y.o. who presents w/ diarrhea and abdominal pain.  CT and pelvis with diffuse colitis with thickening of distal transvers, descending, sigmoid and rectum.      C. difficile colitis with sepsis  ·  Continue oral vancomycin.  This is his first episode of C. difficile  · Diet as tolerated  · Add Levsin  · Patient has never had a colonoscopy. fter a significant episode of colitis advised that he have a colonoscopy in 6 to 8 weeks for follow up and screening purposes. No indication for GI consult at this time    Chronic post prandial abdominal pain  · He describes what sounds like biliary colic ongoing for several months.  Cholelithiasis on CT scan.  · Check US RUQ and possible HIDA scan. If needed based on workup, he can follow up with a general surgeon as OP when colitis resolved    Anemia, chronic disease     · hgb declined no overt bleeding likely partially dilutional decline. Monitor daily   · Iron stores low, replace orally. He has never had screening colonoscopy. Needs outpatient colonoscopy in 6-8wk     CORY on CKD 3B  · Followed by Dr. Bravo.  Nephrology following.  · Creat trending down with  baseline of 2.04 in January 2.04   · Lisinopril held. avoid NSAIDS/nephrotoxic agents and renally dose medications  · IVF with NS     CAD  · Followed by Dr. Luque  · Currently asymptomatic.  No evidence of CHF     DM2  · Hold home regimen.  Correctional insulin.  Diabetic diet.  A1c is very good.     HTN  · Hold ACE with CORY  · BP acceptable.          · SCDs for DVT prophylaxis.  · Full code.  · Discussed with patient and nursing staff.  · Anticipate discharge home with family in 1-2 days.      BERNADETTE Lantigua  [facility]  06/09/21  11:25 EDT

## 2021-06-09 NOTE — PLAN OF CARE
Problem: Adult Inpatient Plan of Care  Goal: Plan of Care Review  Outcome: Ongoing, Progressing  Flowsheets (Taken 6/9/2021 1839)  Progress: no change  Plan of Care Reviewed With: patient  Outcome Summary: pt. A&Ox4 with VSS on monitor. pt. did c/o abd. pain this shift and was medicated PRN. /hr. up ad noam. pt. had several BM today, with the last one this shift being more formed. pt. NPO until after US on galbladder which is taking place around 1900. medications tolerated as ordered. Will continue to monitor.  Goal: Patient-Specific Goal (Individualized)  Outcome: Ongoing, Progressing  Goal: Absence of Hospital-Acquired Illness or Injury  Outcome: Ongoing, Progressing  Intervention: Identify and Manage Fall Risk  Recent Flowsheet Documentation  Taken 6/9/2021 1801 by Kemal Reese, RN  Safety Promotion/Fall Prevention:   assistive device/personal items within reach   clutter free environment maintained   fall prevention program maintained   lighting adjusted   mobility aid in reach   nonskid shoes/slippers when out of bed   room organization consistent   safety round/check completed  Taken 6/9/2021 1600 by Kemal Reese, RN  Safety Promotion/Fall Prevention:   assistive device/personal items within reach   clutter free environment maintained   fall prevention program maintained   lighting adjusted   mobility aid in reach   nonskid shoes/slippers when out of bed   room organization consistent   safety round/check completed  Taken 6/9/2021 1400 by Kemal Reese, RN  Safety Promotion/Fall Prevention:   assistive device/personal items within reach   clutter free environment maintained   fall prevention program maintained   lighting adjusted   mobility aid in reach   nonskid shoes/slippers when out of bed   room organization consistent   safety round/check completed  Taken 6/9/2021 1200 by Kemal Reese, RN  Safety Promotion/Fall Prevention:   assistive device/personal items within reach   clutter free  environment maintained   fall prevention program maintained   lighting adjusted   mobility aid in reach   nonskid shoes/slippers when out of bed   room organization consistent   safety round/check completed  Taken 6/9/2021 1000 by Kemal Reese RN  Safety Promotion/Fall Prevention:   assistive device/personal items within reach   clutter free environment maintained   fall prevention program maintained   lighting adjusted   mobility aid in reach   nonskid shoes/slippers when out of bed   room organization consistent   safety round/check completed  Taken 6/9/2021 0800 by Kemal Reese RN  Safety Promotion/Fall Prevention:   assistive device/personal items within reach   clutter free environment maintained   fall prevention program maintained   lighting adjusted   mobility aid in reach   nonskid shoes/slippers when out of bed   room organization consistent   safety round/check completed  Intervention: Prevent Infection  Recent Flowsheet Documentation  Taken 6/9/2021 1801 by Kemal Reese RN  Infection Prevention:   cohorting utilized   environmental surveillance performed   hand hygiene promoted   personal protective equipment utilized   rest/sleep promoted   single patient room provided  Taken 6/9/2021 1600 by Kemal Reese RN  Infection Prevention:   cohorting utilized   environmental surveillance performed   hand hygiene promoted   rest/sleep promoted   personal protective equipment utilized   single patient room provided  Taken 6/9/2021 1200 by Kemal Reese RN  Infection Prevention:   cohorting utilized   environmental surveillance performed   hand hygiene promoted   personal protective equipment utilized   rest/sleep promoted   single patient room provided  Taken 6/9/2021 1000 by Kemal Reese RN  Infection Prevention:   cohorting utilized   environmental surveillance performed   hand hygiene promoted   personal protective equipment utilized   rest/sleep promoted   single patient room provided  Taken  6/9/2021 0800 by Kemal Reese, RN  Infection Prevention:   cohorting utilized   environmental surveillance performed   hand hygiene promoted   personal protective equipment utilized   single patient room provided  Goal: Optimal Comfort and Wellbeing  Outcome: Ongoing, Progressing  Goal: Readiness for Transition of Care  Outcome: Ongoing, Progressing     Problem: Skin Injury Risk Increased  Goal: Skin Health and Integrity  Outcome: Ongoing, Progressing   Goal Outcome Evaluation:  Plan of Care Reviewed With: patient        Progress: no change  Outcome Summary: pt. A&Ox4 with VSS on monitor. pt. did c/o abd. pain this shift and was medicated PRN. /hr. up ad noam. pt. had several BM today, with the last one this shift being more formed. pt. NPO until after US on galbladder which is taking place around 1900. medications tolerated as ordered. Will continue to monitor.

## 2021-06-10 PROBLEM — K80.20 CHOLELITHIASES: Status: ACTIVE | Noted: 2021-06-10

## 2021-06-10 LAB
ALBUMIN SERPL-MCNC: 3.3 G/DL (ref 3.5–5.2)
ALBUMIN/GLOB SERPL: 1.4 G/DL
ALP SERPL-CCNC: 66 U/L (ref 39–117)
ALT SERPL W P-5'-P-CCNC: 27 U/L (ref 1–41)
ANION GAP SERPL CALCULATED.3IONS-SCNC: 8.5 MMOL/L (ref 5–15)
AST SERPL-CCNC: 17 U/L (ref 1–40)
BILIRUB SERPL-MCNC: 0.2 MG/DL (ref 0–1.2)
BUN SERPL-MCNC: 43 MG/DL (ref 8–23)
BUN/CREAT SERPL: 21.8 (ref 7–25)
CALCIUM SPEC-SCNC: 8.1 MG/DL (ref 8.6–10.5)
CHLORIDE SERPL-SCNC: 108 MMOL/L (ref 98–107)
CO2 SERPL-SCNC: 22.5 MMOL/L (ref 22–29)
CREAT SERPL-MCNC: 1.97 MG/DL (ref 0.76–1.27)
DEPRECATED RDW RBC AUTO: 44 FL (ref 37–54)
ERYTHROCYTE [DISTWIDTH] IN BLOOD BY AUTOMATED COUNT: 12.6 % (ref 12.3–15.4)
GFR SERPL CREATININE-BSD FRML MDRD: 34 ML/MIN/1.73
GLOBULIN UR ELPH-MCNC: 2.3 GM/DL
GLUCOSE BLDC GLUCOMTR-MCNC: 136 MG/DL (ref 70–130)
GLUCOSE BLDC GLUCOMTR-MCNC: 185 MG/DL (ref 70–130)
GLUCOSE BLDC GLUCOMTR-MCNC: 233 MG/DL (ref 70–130)
GLUCOSE BLDC GLUCOMTR-MCNC: 274 MG/DL (ref 70–130)
GLUCOSE SERPL-MCNC: 142 MG/DL (ref 65–99)
HCT VFR BLD AUTO: 32.3 % (ref 37.5–51)
HGB BLD-MCNC: 10.7 G/DL (ref 13–17.7)
MAGNESIUM SERPL-MCNC: 1.8 MG/DL (ref 1.6–2.4)
MCH RBC QN AUTO: 31.4 PG (ref 26.6–33)
MCHC RBC AUTO-ENTMCNC: 33.1 G/DL (ref 31.5–35.7)
MCV RBC AUTO: 94.7 FL (ref 79–97)
PHOSPHATE SERPL-MCNC: 2.9 MG/DL (ref 2.5–4.5)
PLATELET # BLD AUTO: 304 10*3/MM3 (ref 140–450)
PMV BLD AUTO: 10.5 FL (ref 6–12)
POTASSIUM SERPL-SCNC: 4.1 MMOL/L (ref 3.5–5.2)
PROT SERPL-MCNC: 5.6 G/DL (ref 6–8.5)
RBC # BLD AUTO: 3.41 10*6/MM3 (ref 4.14–5.8)
SODIUM SERPL-SCNC: 139 MMOL/L (ref 136–145)
WBC # BLD AUTO: 17.29 10*3/MM3 (ref 3.4–10.8)

## 2021-06-10 PROCEDURE — 80053 COMPREHEN METABOLIC PANEL: CPT | Performed by: INTERNAL MEDICINE

## 2021-06-10 PROCEDURE — 84100 ASSAY OF PHOSPHORUS: CPT | Performed by: INTERNAL MEDICINE

## 2021-06-10 PROCEDURE — 63710000001 INSULIN LISPRO (HUMAN) PER 5 UNITS: Performed by: NURSE PRACTITIONER

## 2021-06-10 PROCEDURE — G0378 HOSPITAL OBSERVATION PER HR: HCPCS

## 2021-06-10 PROCEDURE — 83735 ASSAY OF MAGNESIUM: CPT | Performed by: INTERNAL MEDICINE

## 2021-06-10 PROCEDURE — 85027 COMPLETE CBC AUTOMATED: CPT | Performed by: NURSE PRACTITIONER

## 2021-06-10 PROCEDURE — 82962 GLUCOSE BLOOD TEST: CPT

## 2021-06-10 RX ADMIN — INSULIN LISPRO 2 UNITS: 100 INJECTION, SOLUTION INTRAVENOUS; SUBCUTANEOUS at 12:21

## 2021-06-10 RX ADMIN — ZINC OXIDE 1 APPLICATION: 200 OINTMENT TOPICAL at 20:09

## 2021-06-10 RX ADMIN — VANCOMYCIN HYDROCHLORIDE 125 MG: 125 CAPSULE ORAL at 00:05

## 2021-06-10 RX ADMIN — HYDROCODONE BITARTRATE AND ACETAMINOPHEN 1 TABLET: 5; 325 TABLET ORAL at 20:08

## 2021-06-10 RX ADMIN — ASPIRIN 81 MG: 81 TABLET, FILM COATED ORAL at 08:42

## 2021-06-10 RX ADMIN — HYDROCODONE BITARTRATE AND ACETAMINOPHEN 1 TABLET: 5; 325 TABLET ORAL at 00:05

## 2021-06-10 RX ADMIN — ATORVASTATIN CALCIUM 40 MG: 20 TABLET, FILM COATED ORAL at 08:42

## 2021-06-10 RX ADMIN — INSULIN LISPRO 6 UNITS: 100 INJECTION, SOLUTION INTRAVENOUS; SUBCUTANEOUS at 17:34

## 2021-06-10 RX ADMIN — VANCOMYCIN HYDROCHLORIDE 125 MG: 125 CAPSULE ORAL at 06:11

## 2021-06-10 RX ADMIN — VANCOMYCIN HYDROCHLORIDE 125 MG: 125 CAPSULE ORAL at 17:34

## 2021-06-10 RX ADMIN — INSULIN LISPRO 4 UNITS: 100 INJECTION, SOLUTION INTRAVENOUS; SUBCUTANEOUS at 20:08

## 2021-06-10 RX ADMIN — VANCOMYCIN HYDROCHLORIDE 125 MG: 125 CAPSULE ORAL at 12:21

## 2021-06-10 RX ADMIN — FERROUS SULFATE TAB 325 MG (65 MG ELEMENTAL FE) 325 MG: 325 (65 FE) TAB at 08:42

## 2021-06-10 NOTE — PROGRESS NOTES
Name: Junior Pollard Jr. ADMIT: 2021   : 1955  PCP: Boogie Interiano MD    MRN: [id number] LOS: 2 days   AGE/SEX: 65 y.o. male  ROOM: [facility]     Subjective   Subjective   Stools are still watery but amount and frequency slowing.  generalized abdominal pain with cramping a little better today. No n/v or fever      Review of Systems denies CP, SOB, fever chills, rash, LUTS     Objective   Objective   Vital Signs  Temp:  [98.8 °F (37.1 °C)-100.1 °F (37.8 °C)] 98.8 °F (37.1 °C)  Heart Rate:  [] 75  Resp:  [18] 18  BP: (119-130)/(51-56) 119/51  SpO2:  [96 %-99 %] 96 %  on   ;   Device (Oxygen Therapy): room air  Body mass index is 34.79 kg/m².  Physical Exam  Vitals reviewed.   Constitutional:       Appearance: He is well-developed. He is obese. He is not ill-appearing.   HENT:      Head: Normocephalic and atraumatic.   Cardiovascular:      Rate and Rhythm: Normal rate and regular rhythm.      Pulses: Normal pulses.   Pulmonary:      Effort: Pulmonary effort is normal. No respiratory distress.      Breath sounds: Normal breath sounds.   Abdominal:      General: Bowel sounds are normal. There is distension.      Palpations: Abdomen is soft. There is no mass.      Tenderness: There is abdominal tenderness (better compared to yesterday).      Hernia: No hernia is present.   Skin:     General: Skin is warm and dry.   Neurological:      General: No focal deficit present.      Mental Status: He is alert and oriented to person, place, and time.   Psychiatric:         Behavior: Behavior normal.         Thought Content: Thought content normal.         Judgment: Judgment normal.         Results Review     I reviewed the patient's new clinical results.  Results from last 7 days   Lab Units 06/10/21  0514 21  0538 21  0814 21  0125   WBC 10*3/mm3 17.29* 16.56* 15.42* 15.93*   HEMOGLOBIN g/dL 10.7* 10.2* 10.9* 12.3*   PLATELETS 10*3/mm3 304 261 272 311     Results from last 7 days   Lab  Units 06/10/21  0514 06/09/21  0538 06/08/21  0814 06/08/21  0125   SODIUM mmol/L 139 135* 136 136   POTASSIUM mmol/L 4.1 4.0 4.4 4.2   CHLORIDE mmol/L 108* 106 103 101   CO2 mmol/L 22.5 21.9* 22.7 21.5*   BUN mg/dL 43* 47* 44* 41*   CREATININE mg/dL 1.97* 2.99* 3.73* 3.74*   GLUCOSE mg/dL 142* 87 111* 90   Estimated Creatinine Clearance: 47.9 mL/min (A) (by C-G formula based on SCr of 1.97 mg/dL (H)).  Results from last 7 days   Lab Units 06/10/21  0514 06/09/21  0538 06/08/21  0125   ALBUMIN g/dL 3.30* 2.70* 3.90   BILIRUBIN mg/dL 0.2  --  0.5   ALK PHOS U/L 66  --  64   AST (SGOT) U/L 17  --  32   ALT (SGPT) U/L 27  --  42*     Results from last 7 days   Lab Units 06/10/21  0514 06/09/21  0538 06/08/21  0814 06/08/21  0125   CALCIUM mg/dL 8.1* 8.0* 8.4* 9.2   ALBUMIN g/dL 3.30* 2.70*  --  3.90   MAGNESIUM mg/dL 1.8 1.7  --   --    PHOSPHORUS mg/dL 2.9 3.0  --   --        COVID19   Date Value Ref Range Status   06/08/2021 Not Detected Not Detected - Ref. Range Final     Hemoglobin A1C   Date/Time Value Ref Range Status   06/08/2021 0814 5.40 4.80 - 5.60 % Final     Glucose   Date/Time Value Ref Range Status   06/10/2021 1048 185 (H) 70 - 130 mg/dL Final   06/10/2021 0607 136 (H) 70 - 130 mg/dL Final   06/09/2021 2041 172 (H) 70 - 130 mg/dL Final   06/09/2021 1655 266 (H) 70 - 130 mg/dL Final   06/09/2021 1105 170 (H) 70 - 130 mg/dL Final   06/09/2021 0614 128 70 - 130 mg/dL Final   06/08/2021 1932 246 (H) 70 - 130 mg/dL Final       US Gallbladder  Narrative: PROCEDURE: US GALLBLADDER-     HISTORY: Biliary colic.     TECHNIQUE: Grayscale and color Doppler ultrasound of the gallbladder was  performed.     COMPARISON: CT abdomen and pelvis without contrast 06/08/2021.      FINDINGS:     MEASUREMENTS:   Liver:  17.6 cm   Common bile duct diameter:  0.3 cm  Right kidney: 10.9 x 4.6 x 5.1 cm     LIVER: The liver is mildly enlarged and normal in echogenicity. The  echotexture is smooth. There is no intrahepatic mass or  biliary ductal  dilatation. There is hepatopetal flow in the main portal vein. There is  small volume perihepatic fluid.     GALLBLADDER: There is cholelithiasis including a dominant 1.4 cm stone.  The gallbladder is poorly distended. There is no gallbladder wall  thickening. No sonographic Cornejo's sign was reported. There is no  extrahepatic biliary ductal dilatation.     PANCREAS: The pancreas is poorly seen and cannot be evaluated.     RIGHT KIDNEY: There is no hydronephrosis. There are renal sinus cysts in  the right kidney. No shadowing renal stone is visualized.        Impression:    Cholelithiasis. No biliary ductal dilatation. Mild hepatomegaly. Small  volume perihepatic fluid.     This report was finalized on 6/9/2021 7:54 PM by Dr. May Quiroz M.D.       Scheduled Medications  aspirin, 81 mg, Oral, Daily  atorvastatin, 40 mg, Oral, Daily  ferrous sulfate, 325 mg, Oral, Daily With Breakfast  hydrocortisone-bacitracin-zinc oxide-nystatin, 1 application, Topical, BID  insulin lispro, 0-9 Units, Subcutaneous, 4x Daily With Meals & Nightly  vancomycin, 125 mg, Oral, Q6H    Infusions  Pharmacy Consult,     Diet  Diet Regular; Cardiac, Placer, Consistent Carbohydrate, Low Fiber / Low Residue       Assessment/Plan     Active Hospital Problems    Diagnosis  POA   • **Clostridium difficile colitis [A04.72]  Yes   • Cholelithiases [K80.20]  Yes   • CORY (acute kidney injury) (CMS/HCC) [N17.9]  Yes   • History of diverticulitis [Z87.19]  Not Applicable   • Uncontrolled type 2 diabetes mellitus with hyperglycemia (CMS/HCC) [E11.65]  Yes   • Chronic coronary artery disease [I25.10]  Yes   • Type 2 diabetes with nephropathy (CMS/HCC) [E11.21]  Yes   • Stage 3b chronic kidney disease (CMS/HCC) [N18.32]  Yes   • Benign essential hypertension [I10]  Yes      Resolved Hospital Problems   No resolved problems to display.       65 y.o. male admitted with Clostridium difficile colitis.     is a 65 y.o. who presents w/ diarrhea  and abdominal pain.  CT and pelvis with diffuse colitis with thickening of distal transvers, descending, sigmoid and rectum.      C. difficile colitis with sepsis  ·  Continue oral vancomycin.  This is his first episode of C. difficile  · Diet as tolerated. Stop levsin since did not help pain. PRN norco.   · Overall slow improvement but WBC trending upwards. No other obvious source of infection. Continue to monitor and if continue to increase consider ID consult.   · Patient has never had a colonoscopy. fter a significant episode of colitis advised that he have a colonoscopy in 6 to 8 weeks for follow up and screening purposes. No indication for GI consult at this time    Chronic post prandial abdominal pain  · He describes what sounds like biliary colic ongoing for several months.  Cholelithiasis on CT scan.  ·  US RUQ with stone. Recommend HIDA scan as OP.     Anemia, chronic disease    · hgb declined no overt bleeding likely partially dilutional decline. Monitor daily   · Iron stores low, replace orally. He has never had screening colonoscopy. Needs outpatient colonoscopy in 6-8wk     CORY on CKD 3B  · Followed by Dr. Bravo.  Nephrology following.  · Creat trending down with  Now close to baseline of 2.04 in January   · Lisinopril still held and also hold at DC per renal.      CAD  · Followed by Dr. Luque  · Currently asymptomatic.  No evidence of CHF     DM2  · Hold home regimen.  Correctional insulin.  Diabetic diet.  A1c is very good.     HTN  · Hold ACE until seen by renal as OP  · BP acceptable.          · SCDs for DVT prophylaxis.  · Full code.  · Discussed with patient and nursing staff.  · Anticipate discharge home with family in 1-2 days.      BERNADETTE Lantigua  [facility]  06/10/21  12:41 EDT

## 2021-06-10 NOTE — PROGRESS NOTES
"   LOS: 2 days    Patient Care Team:  Boogie Interiano MD as PCP - General (Family Medicine)  Gerald Bravo MD as Consulting Physician (Nephrology)    Chief Complaint:    Chief Complaint   Patient presents with   • Diarrhea   • Nausea     Follow UP Lida on CKD 3  Subjective     Interval History:   Feels better. Still with loose stools, less frequent.  Has US gallbladder last night.  Stone, no wall thickening, no biliary duct dilatation.  Urinating. Eating. Drinking. IVF dc. Not soa.     Review of Systems:   As noted above    Objective     Vital Signs  Temp:  [98.8 °F (37.1 °C)-100.1 °F (37.8 °C)] 98.8 °F (37.1 °C)  Heart Rate:  [] 75  Resp:  [18] 18  BP: (119-130)/(51-56) 119/51    Flowsheet Rows      First Filed Value   Admission Height  182.9 cm (72.01\") Documented at 06/08/2021 0332   Admission Weight  118 kg (260 lb) Documented at 06/08/2021 0332          No intake/output data recorded.  I/O last 3 completed shifts:  In: 2240 [P.O.:1140; I.V.:1100]  Out: -     Intake/Output Summary (Last 24 hours) at 6/10/2021 0914  Last data filed at 6/9/2021 1708  Gross per 24 hour   Intake 560 ml   Output --   Net 560 ml       Physical Exam:  General Appearance: alert, oriented x 3, no acute distress,   Skin: warm and dry  HEENT: pupils round and reactive to light, oral mucosa normal, nonicteric sclera. Mask applied .  Neck: supple, no JVD, trachea midline  Lungs: Clear to auscultation. No wheezing .  Heart: RRR, normal S1 and S2, no S3, no rub  Abdomen: softer, less tender overall, distended, + bs, no body wall edema .  Extremities: trace lower ext.  Edema.  Neuro: normal speech and mental status       Results Review:    Results from last 7 days   Lab Units 06/10/21  0514 06/09/21  0538 06/08/21  0814 06/08/21  0125   SODIUM mmol/L 139 135* 136 136   POTASSIUM mmol/L 4.1 4.0 4.4 4.2   CHLORIDE mmol/L 108* 106 103 101   CO2 mmol/L 22.5 21.9* 22.7 21.5*   BUN mg/dL 43* 47* 44* 41*   CREATININE mg/dL 1.97* 2.99* " 3.73* 3.74*   CALCIUM mg/dL 8.1* 8.0* 8.4* 9.2   BILIRUBIN mg/dL 0.2  --   --  0.5   ALK PHOS U/L 66  --   --  64   ALT (SGPT) U/L 27  --   --  42*   AST (SGOT) U/L 17  --   --  32   GLUCOSE mg/dL 142* 87 111* 90       Estimated Creatinine Clearance: 47.9 mL/min (A) (by C-G formula based on SCr of 1.97 mg/dL (H)).    Results from last 7 days   Lab Units 06/10/21  0514 06/09/21  0538   MAGNESIUM mg/dL 1.8 1.7   PHOSPHORUS mg/dL 2.9 3.0             Results from last 7 days   Lab Units 06/10/21  0514 06/09/21  0538 06/08/21  0814 06/08/21  0125   WBC 10*3/mm3 17.29* 16.56* 15.42* 15.93*   HEMOGLOBIN g/dL 10.7* 10.2* 10.9* 12.3*   PLATELETS 10*3/mm3 304 261 272 311               Imaging Results (Last 24 Hours)     Procedure Component Value Units Date/Time    US Gallbladder [[id number]] Collected: 06/09/21 1949     Updated: 06/09/21 1957    Narrative:      PROCEDURE: US GALLBLADDER-     HISTORY: Biliary colic.     TECHNIQUE: Grayscale and color Doppler ultrasound of the gallbladder was  performed.     COMPARISON: CT abdomen and pelvis without contrast 06/08/2021.      FINDINGS:     MEASUREMENTS:   Liver:  17.6 cm   Common bile duct diameter:  0.3 cm  Right kidney: 10.9 x 4.6 x 5.1 cm     LIVER: The liver is mildly enlarged and normal in echogenicity. The  echotexture is smooth. There is no intrahepatic mass or biliary ductal  dilatation. There is hepatopetal flow in the main portal vein. There is  small volume perihepatic fluid.     GALLBLADDER: There is cholelithiasis including a dominant 1.4 cm stone.  The gallbladder is poorly distended. There is no gallbladder wall  thickening. No sonographic Cornejo's sign was reported. There is no  extrahepatic biliary ductal dilatation.     PANCREAS: The pancreas is poorly seen and cannot be evaluated.     RIGHT KIDNEY: There is no hydronephrosis. There are renal sinus cysts in  the right kidney. No shadowing renal stone is visualized.          Impression:         Cholelithiasis. No  biliary ductal dilatation. Mild hepatomegaly. Small  volume perihepatic fluid.     This report was finalized on 6/9/2021 7:54 PM by Dr. May Quiroz M.D.           aspirin, 81 mg, Oral, Daily  atorvastatin, 40 mg, Oral, Daily  ferrous sulfate, 325 mg, Oral, Daily With Breakfast  hydrocortisone-bacitracin-zinc oxide-nystatin, 1 application, Topical, BID  insulin lispro, 0-9 Units, Subcutaneous, 4x Daily With Meals & Nightly  vancomycin, 125 mg, Oral, Q6H      Pharmacy Consult,         Medication Review:   Current Facility-Administered Medications   Medication Dose Route Frequency Provider Last Rate Last Admin   • acetaminophen (TYLENOL) tablet 650 mg  650 mg Oral Q4H PRN Tierra Pearson APRN   650 mg at 06/09/21 0916   • aspirin EC tablet 81 mg  81 mg Oral Daily Amanda Ross APRN   81 mg at 06/10/21 0842   • atorvastatin (LIPITOR) tablet 40 mg  40 mg Oral Daily Amanda Ross APRN   40 mg at 06/10/21 0842   • dextrose (D50W) 25 g/ 50mL Intravenous Solution 25 g  25 g Intravenous Q15 Min PRN Amanda Ross APRN       • dextrose (GLUTOSE) oral gel 15 g  15 g Oral Q15 Min PRN Amanda Ross APRN       • ferrous sulfate tablet 325 mg  325 mg Oral Daily With Breakfast Amanda Ross APRN   325 mg at 06/10/21 0842   • glucagon (human recombinant) (GLUCAGEN DIAGNOSTIC) injection 1 mg  1 mg Subcutaneous Q15 Min PRN Amanda Ross APRN       • HYDROcodone-acetaminophen (NORCO) 5-325 MG per tablet 1 tablet  1 tablet Oral Q6H PRN Amanda Ross APRN   1 tablet at 06/10/21 0005   • hydrocortisone-bacitracin-zinc oxide-nystatin (MAGIC BARRIER) ointment 1 application  1 application Topical BID Henok Velazquez MD       • hyoscyamine (LEVSIN) SL tablet 125 mcg  125 mcg Sublingual Q4H PRN Amanda Ross APRN   125 mcg at 06/09/21 1513   • insulin lispro (ADMELOG) injection 0-9 Units  0-9 Units Subcutaneous 4x Daily With Meals & Nightly Amanda Ross, APRN   2 Units at  06/09/21 2059   • nitroglycerin (NITROSTAT) SL tablet 0.4 mg  0.4 mg Sublingual Q5 Min PRN Tierra Pearson APRN       • ondansetron (ZOFRAN) tablet 4 mg  4 mg Oral Q6H PRN Tierra Pearson APRN        Or   • ondansetron (ZOFRAN) injection 4 mg  4 mg Intravenous Q6H PRN Tierra Pearson APRN       • Pharmacy Consult   Does not apply Continuous PRN Amanda Ross APRN       • vancomycin (VANCOCIN) capsule 125 mg  125 mg Oral Q6H Amanda Ross APRN   125 mg at 06/10/21 0611       Assessment/Plan   1. Lida on CKD III, baseline 1.6-2.  Likely hypovolemic, hypotension . Now improved.  Waste products improved.   Off ACE inhibitor for now.    2. C. Dif colitis.  PO vanc. Less frequent, but having more abdominal pain today. WBC stagnant .  3. History of HTN. Controlled. Off lisinopril for now .  Would not resume ACE inhibitor at this point.   4. DM2  5. Anemia . Chronic disease.   6. Post prandial abdominal pain. GB US with stone, no biliary duct dilatation, no wall thickening.        Patient has follow up with Dr. Gerald Bravo in our office June 16 at 3:40 pm when discharged.     Susanna Perez MD  06/10/21  09:14 EDT

## 2021-06-10 NOTE — PLAN OF CARE
Problem: Adult Inpatient Plan of Care  Goal: Plan of Care Review  Outcome: Ongoing, Progressing  Flowsheets (Taken 6/10/2021 1530)  Progress: improving  Plan of Care Reviewed With:  • patient  • daughter  Outcome Summary: VSS. No c/o pain, no signs of distress. A&Ox4. Up ad noam. Three (3)BM this shift. Remains RA. Monitoring BG and dosing accordingly. Monitoring labs. Family updated at bedside  Goal: Patient-Specific Goal (Individualized)  Outcome: Ongoing, Progressing  Goal: Absence of Hospital-Acquired Illness or Injury  Outcome: Ongoing, Progressing  Intervention: Identify and Manage Fall Risk  Recent Flowsheet Documentation  Taken 6/10/2021 1435 by Stephania Bloom, RN  Safety Promotion/Fall Prevention:  • activity supervised  • assistive device/personal items within reach  • clutter free environment maintained  • muscle strengthening facilitated  • room organization consistent  • safety round/check completed  Taken 6/10/2021 1210 by Stephania Bloom, RN  Safety Promotion/Fall Prevention:  • activity supervised  • assistive device/personal items within reach  • clutter free environment maintained  • fall prevention program maintained  • nonskid shoes/slippers when out of bed  • room organization consistent  • safety round/check completed  Taken 6/10/2021 1017 by Stephania Bloom, RN  Safety Promotion/Fall Prevention:  • activity supervised  • assistive device/personal items within reach  • clutter free environment maintained  • fall prevention program maintained  • nonskid shoes/slippers when out of bed  • room organization consistent  • safety round/check completed  • lighting adjusted  Taken 6/10/2021 0846 by Stephania Bloom, RN  Safety Promotion/Fall Prevention:  • activity supervised  • assistive device/personal items within reach  • clutter free environment maintained  • fall prevention program maintained  • nonskid shoes/slippers when out of bed  • room organization consistent  •  safety round/check completed  Intervention: Prevent Skin Injury  Recent Flowsheet Documentation  Taken 6/10/2021 1435 by Stephania Bloom RN  Body Position: position changed independently  Skin Protection:  • adhesive use limited  • tubing/devices free from skin contact  • transparent dressing maintained  Taken 6/10/2021 1210 by Stephania Bloom RN  Body Position: sitting up in bed  Taken 6/10/2021 1017 by Stephania Bloom RN  Body Position: position changed independently  Taken 6/10/2021 0846 by Stephania Bloom RN  Body Position: position changed independently  Skin Protection:  • adhesive use limited  • tubing/devices free from skin contact  • transparent dressing maintained  Intervention: Prevent and Manage VTE (venous thromboembolism) Risk  Recent Flowsheet Documentation  Taken 6/10/2021 1435 by Stephania Bloom RN  VTE Prevention/Management:  • bilateral  • dorsiflexion/plantar flexion performed  Taken 6/10/2021 0846 by Stephania Bloom RN  VTE Prevention/Management:  • bilateral  • dorsiflexion/plantar flexion performed  Intervention: Prevent Infection  Recent Flowsheet Documentation  Taken 6/10/2021 1435 by Stephania Bloom RN  Infection Prevention:  • personal protective equipment utilized  • rest/sleep promoted  • visitors restricted/screened  • single patient room provided  Taken 6/10/2021 1210 by Stephania Bloom RN  Infection Prevention:  • rest/sleep promoted  • personal protective equipment utilized  • single patient room provided  • visitors restricted/screened  Taken 6/10/2021 1017 by Stephania Bloom RN  Infection Prevention:  • personal protective equipment utilized  • rest/sleep promoted  • single patient room provided  • visitors restricted/screened  Taken 6/10/2021 0846 by Stephania Bloom RN  Infection Prevention:  • cohorting utilized  • personal protective equipment utilized  • single patient room provided  • visitors  restricted/screened  • rest/sleep promoted  Goal: Optimal Comfort and Wellbeing  Outcome: Ongoing, Progressing  Intervention: Provide Person-Centered Care  Recent Flowsheet Documentation  Taken 6/10/2021 1435 by Stephania Bloom RN  Trust Relationship/Rapport: care explained  Taken 6/10/2021 0846 by Stephania Bloom RN  Trust Relationship/Rapport: care explained  Goal: Readiness for Transition of Care  Outcome: Ongoing, Progressing     Problem: Skin Injury Risk Increased  Goal: Skin Health and Integrity  Outcome: Ongoing, Progressing  Intervention: Optimize Skin Protection  Recent Flowsheet Documentation  Taken 6/10/2021 1435 by Stephania Bloom RN  Pressure Reduction Techniques:  • positioned off wounds  • pressure points protected  Head of Bed (HOB): HOB elevated  Pressure Reduction Devices: positioning supports utilized  Skin Protection:  • adhesive use limited  • tubing/devices free from skin contact  • transparent dressing maintained  Taken 6/10/2021 1210 by Stephania Bloom RN  Head of Bed (HOB): HOB elevated  Taken 6/10/2021 1017 by Stephania Bloom RN  Head of Bed (HOB): HOB elevated  Taken 6/10/2021 0846 by Stephania Bloom RN  Pressure Reduction Techniques:  • frequent weight shift encouraged  • sit time limited to 2 hours  • pressure points protected  Head of Bed (HOB): HOB elevated  Pressure Reduction Devices:  • alternating pressure pump (ADD)  • positioning supports utilized  Skin Protection:  • adhesive use limited  • tubing/devices free from skin contact  • transparent dressing maintained   Goal Outcome Evaluation:  Plan of Care Reviewed With: patient, daughter        Progress: improving  Outcome Summary: VSS. No c/o pain, no signs of distress. A&Ox4. Up ad noam. No BM this shift, last BM was at 0400 06/10/2021. Remains RA. Monitoring BG and dosing accordingly. Monitoring labs. Family updated at bedside

## 2021-06-11 PROBLEM — A41.9 SEPSIS WITHOUT ACUTE ORGAN DYSFUNCTION: Status: ACTIVE | Noted: 2021-06-11

## 2021-06-11 LAB
ALBUMIN SERPL-MCNC: 2.6 G/DL (ref 3.5–5.2)
ANION GAP SERPL CALCULATED.3IONS-SCNC: 7.3 MMOL/L (ref 5–15)
BUN SERPL-MCNC: 33 MG/DL (ref 8–23)
BUN/CREAT SERPL: 21.3 (ref 7–25)
CALCIUM SPEC-SCNC: 8.4 MG/DL (ref 8.6–10.5)
CHLORIDE SERPL-SCNC: 111 MMOL/L (ref 98–107)
CO2 SERPL-SCNC: 20.7 MMOL/L (ref 22–29)
CREAT SERPL-MCNC: 1.55 MG/DL (ref 0.76–1.27)
DEPRECATED RDW RBC AUTO: 42.6 FL (ref 37–54)
EOSINOPHIL # BLD MANUAL: 0.79 10*3/MM3 (ref 0–0.4)
EOSINOPHIL NFR BLD MANUAL: 5 % (ref 0.3–6.2)
ERYTHROCYTE [DISTWIDTH] IN BLOOD BY AUTOMATED COUNT: 12.4 % (ref 12.3–15.4)
GFR SERPL CREATININE-BSD FRML MDRD: 45 ML/MIN/1.73
GLUCOSE BLDC GLUCOMTR-MCNC: 120 MG/DL (ref 70–130)
GLUCOSE BLDC GLUCOMTR-MCNC: 211 MG/DL (ref 70–130)
GLUCOSE BLDC GLUCOMTR-MCNC: 236 MG/DL (ref 70–130)
GLUCOSE BLDC GLUCOMTR-MCNC: 262 MG/DL (ref 70–130)
GLUCOSE SERPL-MCNC: 135 MG/DL (ref 65–99)
HCT VFR BLD AUTO: 30.3 % (ref 37.5–51)
HGB BLD-MCNC: 10.2 G/DL (ref 13–17.7)
LYMPHOCYTES # BLD MANUAL: 0.79 10*3/MM3 (ref 0.7–3.1)
LYMPHOCYTES NFR BLD MANUAL: 5 % (ref 19.6–45.3)
LYMPHOCYTES NFR BLD MANUAL: 7 % (ref 5–12)
MCH RBC QN AUTO: 31.3 PG (ref 26.6–33)
MCHC RBC AUTO-ENTMCNC: 33.7 G/DL (ref 31.5–35.7)
MCV RBC AUTO: 92.9 FL (ref 79–97)
MONOCYTES # BLD AUTO: 1.11 10*3/MM3 (ref 0.1–0.9)
NEUTROPHILS # BLD AUTO: 13.11 10*3/MM3 (ref 1.7–7)
NEUTROPHILS NFR BLD MANUAL: 83 % (ref 42.7–76)
PHOSPHATE SERPL-MCNC: 2.4 MG/DL (ref 2.5–4.5)
PLAT MORPH BLD: NORMAL
PLATELET # BLD AUTO: 299 10*3/MM3 (ref 140–450)
PMV BLD AUTO: 10.3 FL (ref 6–12)
POIKILOCYTOSIS BLD QL SMEAR: ABNORMAL
POTASSIUM SERPL-SCNC: 4.2 MMOL/L (ref 3.5–5.2)
RBC # BLD AUTO: 3.26 10*6/MM3 (ref 4.14–5.8)
SMUDGE CELLS BLD QL SMEAR: ABNORMAL
SODIUM SERPL-SCNC: 139 MMOL/L (ref 136–145)
WBC # BLD AUTO: 15.79 10*3/MM3 (ref 3.4–10.8)

## 2021-06-11 PROCEDURE — 80069 RENAL FUNCTION PANEL: CPT | Performed by: INTERNAL MEDICINE

## 2021-06-11 PROCEDURE — 85007 BL SMEAR W/DIFF WBC COUNT: CPT | Performed by: HOSPITALIST

## 2021-06-11 PROCEDURE — G0378 HOSPITAL OBSERVATION PER HR: HCPCS

## 2021-06-11 PROCEDURE — 63710000001 INSULIN LISPRO (HUMAN) PER 5 UNITS: Performed by: NURSE PRACTITIONER

## 2021-06-11 PROCEDURE — 82962 GLUCOSE BLOOD TEST: CPT

## 2021-06-11 PROCEDURE — 85025 COMPLETE CBC W/AUTO DIFF WBC: CPT | Performed by: HOSPITALIST

## 2021-06-11 RX ADMIN — ZINC OXIDE 1 APPLICATION: 200 OINTMENT TOPICAL at 21:44

## 2021-06-11 RX ADMIN — VANCOMYCIN HYDROCHLORIDE 125 MG: 125 CAPSULE ORAL at 06:17

## 2021-06-11 RX ADMIN — FERROUS SULFATE TAB 325 MG (65 MG ELEMENTAL FE) 325 MG: 325 (65 FE) TAB at 09:21

## 2021-06-11 RX ADMIN — VANCOMYCIN HYDROCHLORIDE 125 MG: 125 CAPSULE ORAL at 18:10

## 2021-06-11 RX ADMIN — VANCOMYCIN HYDROCHLORIDE 125 MG: 125 CAPSULE ORAL at 12:58

## 2021-06-11 RX ADMIN — ASPIRIN 81 MG: 81 TABLET, FILM COATED ORAL at 09:21

## 2021-06-11 RX ADMIN — ZINC OXIDE 1 APPLICATION: 200 OINTMENT TOPICAL at 09:22

## 2021-06-11 RX ADMIN — ATORVASTATIN CALCIUM 40 MG: 20 TABLET, FILM COATED ORAL at 09:21

## 2021-06-11 RX ADMIN — VANCOMYCIN HYDROCHLORIDE 125 MG: 125 CAPSULE ORAL at 23:56

## 2021-06-11 RX ADMIN — INSULIN LISPRO 4 UNITS: 100 INJECTION, SOLUTION INTRAVENOUS; SUBCUTANEOUS at 21:44

## 2021-06-11 RX ADMIN — INSULIN LISPRO 4 UNITS: 100 INJECTION, SOLUTION INTRAVENOUS; SUBCUTANEOUS at 12:58

## 2021-06-11 RX ADMIN — INSULIN LISPRO 6 UNITS: 100 INJECTION, SOLUTION INTRAVENOUS; SUBCUTANEOUS at 18:10

## 2021-06-11 RX ADMIN — HYDROCODONE BITARTRATE AND ACETAMINOPHEN 1 TABLET: 5; 325 TABLET ORAL at 23:56

## 2021-06-11 RX ADMIN — VANCOMYCIN HYDROCHLORIDE 125 MG: 125 CAPSULE ORAL at 00:08

## 2021-06-11 RX ADMIN — HYDROCODONE BITARTRATE AND ACETAMINOPHEN 1 TABLET: 5; 325 TABLET ORAL at 18:09

## 2021-06-11 NOTE — PROGRESS NOTES
[facility] Progress Note      Patient Name: Junior Pollard Jr.  : 1955  MRN: [id number]  Primary Care Physician:  Boogie Interiano MD  Date of admission: 2021    Subjective     Interval History:   Feeling somewhat better  Still with frequent stools but significantly improved compared to admission    Review of Systems:   14 point review of systems is otherwise negative except for mentioned above on HPI    Objective     Vitals:   Temp:  [98.3 °F (36.8 °C)-99.7 °F (37.6 °C)] 98.8 °F (37.1 °C)  Heart Rate:  [72-82] 75  Resp:  [16-20] 16  BP: (125-139)/(53-61) 127/53    Intake/Output Summary (Last 24 hours) at 2021 1147  Last data filed at 2021 0900  Gross per 24 hour   Intake 960 ml   Output --   Net 960 ml       Physical Exam:    General Appearance: alert, oriented x 3, no acute distress   Skin: warm and dry  HEENT: oral mucosa normal, nonicteric sclera  Neck: supple, no JVD  Lungs: CTA  Heart: RRR, normal S1 and S2  Abdomen: soft, nontender, nondistended  Extremities: no edema, cyanosis or clubbing  Neuro: normal speech and mental status     Scheduled Meds:     aspirin, 81 mg, Oral, Daily  atorvastatin, 40 mg, Oral, Daily  ferrous sulfate, 325 mg, Oral, Daily With Breakfast  hydrocortisone-bacitracin-zinc oxide-nystatin, 1 application, Topical, BID  insulin lispro, 0-9 Units, Subcutaneous, 4x Daily With Meals & Nightly  vancomycin, 125 mg, Oral, Q6H      IV Meds:   Pharmacy Consult,         Results Reviewed:   I have personally reviewed the results from the time of this admission to 2021 11:47 EDT     Lab Results   Component Value Date    GLUCOSE 135 (H) 2021    CALCIUM 8.4 (L) 2021     2021    K 4.2 2021    CO2 20.7 (L) 2021     (H) 2021    BUN 33 (H) 2021    CREATININE 1.55 (H) 2021    EGFRIFAFRI 40 (L) 2021    EGFRIFNONA 45 (L) 2021    BCR 21.3 2021    ANIONGAP 7.3 2021       Lab Results   Component Value Date    CALCIUM 8.4 (L) 06/11/2021    PHOS 2.4 (L) 06/11/2021     Lab Results   Component Value Date    MG 1.8 06/10/2021      US Gallbladder    Result Date: 6/9/2021  PROCEDURE: US GALLBLADDER-  HISTORY: Biliary colic.  TECHNIQUE: Grayscale and color Doppler ultrasound of the gallbladder was performed.  COMPARISON: CT abdomen and pelvis without contrast 06/08/2021.  FINDINGS:  MEASUREMENTS: Liver:  17.6 cm Common bile duct diameter:  0.3 cm Right kidney: 10.9 x 4.6 x 5.1 cm  LIVER: The liver is mildly enlarged and normal in echogenicity. The echotexture is smooth. There is no intrahepatic mass or biliary ductal dilatation. There is hepatopetal flow in the main portal vein. There is small volume perihepatic fluid.  GALLBLADDER: There is cholelithiasis including a dominant 1.4 cm stone. The gallbladder is poorly distended. There is no gallbladder wall thickening. No sonographic Cornejo's sign was reported. There is no extrahepatic biliary ductal dilatation.  PANCREAS: The pancreas is poorly seen and cannot be evaluated.  RIGHT KIDNEY: There is no hydronephrosis. There are renal sinus cysts in the right kidney. No shadowing renal stone is visualized.       Impression:  Cholelithiasis. No biliary ductal dilatation. Mild hepatomegaly. Small volume perihepatic fluid.  This report was finalized on 6/9/2021 7:54 PM by Dr. May Quiroz M.D.         Assessment / Plan     ASSESSMENT:  1. CORY on CKD III, baseline 1.5-1.7mg/dL.  Likely due to prerenal state from volume depletion and hypotension in the setting of ongoing diarrhea.   2. C. Dif colitis, currently on p.o. Vanco, clinically improving  3. History of HTN  5. Anemia    PLAN:  1.  Okay to stay off IV fluids and encourage p.o. intake  2.  Continue to hold lisinopril and upon discharge please keep off lisinopril until seen by Dr. Bravo in our clinic  3.  Otherwise okay to [place] from renal standpoint with outpatient follow-up which we will  arrange to our office.    Thank you for involving us in the care of Junior FERNANDO Pollard Jr..  Please feel free to call with any questions.    Jennyfer Rebollar MD  06/11/21  11:47 EDT    [facility]  [phone]      Much of this encounter note is an electronic transcription/translation of spoken language to printed text. The electronic translation of spoken language may permit erroneous, or at times, nonsensical words or phrases to be inadvertently transcribed; Although I have reviewed the note for such errors, some may still exist.

## 2021-06-11 NOTE — PLAN OF CARE
Goal Outcome Evaluation:  Plan of Care Reviewed With: patient           Outcome Summary: A&Ox4, no c/o pain, up ad noam, loose stool x2 pt reports improvement in abd discomfort, continuing with PO vanc. Will continue to monitor

## 2021-06-11 NOTE — PROGRESS NOTES
Name: Junior Pollard Jr. ADMIT: 2021   : 1955  PCP: Boogie Interiano MD    MRN: [id number] LOS: 3 days   AGE/SEX: 65 y.o. male  ROOM: [facility]     Subjective   Subjective    Still with some moderate abdominal discomfort and frequent loose stools last evening.  Tolerating regular food.  Ambulating without difficulty in the room and in hallway.  No pain, cramping, swelling legs.    Review of Systems   Respiratory: Negative for shortness of breath.    Cardiovascular: Negative for chest pain.   Gastrointestinal: Positive for abdominal pain and diarrhea. Negative for abdominal distention, blood in stool and nausea.         Objective   Objective   Vital Signs  Temp:  [98.3 °F (36.8 °C)-99.7 °F (37.6 °C)] 98.8 °F (37.1 °C)  Heart Rate:  [72-82] 75  Resp:  [16-20] 16  BP: (125-139)/(53-61) 127/53  SpO2:  [95 %-97 %] 97 %  on   ;   Device (Oxygen Therapy): room air  Body mass index is 34.97 kg/m².  Physical Exam  Vitals reviewed.   Constitutional:       Appearance: He is well-developed. He is obese. He is not ill-appearing.   HENT:      Head: Normocephalic and atraumatic.   Cardiovascular:      Rate and Rhythm: Normal rate and regular rhythm.      Pulses: Normal pulses.   Pulmonary:      Effort: Pulmonary effort is normal. No respiratory distress.      Breath sounds: Normal breath sounds.   Abdominal:      General: Bowel sounds are normal. There is no distension.      Palpations: Abdomen is soft. There is no mass.      Tenderness: There is abdominal tenderness (better compared to yesterday).      Hernia: No hernia is present.   Skin:     General: Skin is warm and dry.   Neurological:      General: No focal deficit present.      Mental Status: He is alert and oriented to person, place, and time.   Psychiatric:         Behavior: Behavior normal.         Thought Content: Thought content normal.         Judgment: Judgment normal.         Results Review     I reviewed the patient's new clinical results.  Results  from last 7 days   Lab Units 06/11/21  0536 06/10/21  0514 06/09/21  0538 06/08/21  0814   WBC 10*3/mm3 15.79* 17.29* 16.56* 15.42*   HEMOGLOBIN g/dL 10.2* 10.7* 10.2* 10.9*   PLATELETS 10*3/mm3 299 304 261 272     Results from last 7 days   Lab Units 06/11/21  0536 06/10/21  0514 06/09/21  0538 06/08/21  0814   SODIUM mmol/L 139 139 135* 136   POTASSIUM mmol/L 4.2 4.1 4.0 4.4   CHLORIDE mmol/L 111* 108* 106 103   CO2 mmol/L 20.7* 22.5 21.9* 22.7   BUN mg/dL 33* 43* 47* 44*   CREATININE mg/dL 1.55* 1.97* 2.99* 3.73*   GLUCOSE mg/dL 135* 142* 87 111*   Estimated Creatinine Clearance: 61.1 mL/min (A) (by C-G formula based on SCr of 1.55 mg/dL (H)).  Results from last 7 days   Lab Units 06/11/21  0536 06/10/21  0514 06/09/21  0538 06/08/21  0125   ALBUMIN g/dL 2.60* 3.30* 2.70* 3.90   BILIRUBIN mg/dL  --  0.2  --  0.5   ALK PHOS U/L  --  66  --  64   AST (SGOT) U/L  --  17  --  32   ALT (SGPT) U/L  --  27  --  42*     Results from last 7 days   Lab Units 06/11/21  0536 06/10/21  0514 06/09/21  0538 06/08/21 0814 06/08/21  0125   CALCIUM mg/dL 8.4* 8.1* 8.0* 8.4* 9.2   ALBUMIN g/dL 2.60* 3.30* 2.70*  --  3.90   MAGNESIUM mg/dL  --  1.8 1.7  --   --    PHOSPHORUS mg/dL 2.4* 2.9 3.0  --   --        COVID19   Date Value Ref Range Status   06/08/2021 Not Detected Not Detected - Ref. Range Final     Glucose   Date/Time Value Ref Range Status   06/11/2021 0559 120 70 - 130 mg/dL Final   06/10/2021 1950 233 (H) 70 - 130 mg/dL Final   06/10/2021 1540 274 (H) 70 - 130 mg/dL Final   06/10/2021 1048 185 (H) 70 - 130 mg/dL Final   06/10/2021 0607 136 (H) 70 - 130 mg/dL Final   06/09/2021 2041 172 (H) 70 - 130 mg/dL Final   06/09/2021 1655 266 (H) 70 - 130 mg/dL Final       US Gallbladder  Narrative: PROCEDURE: US GALLBLADDER-     HISTORY: Biliary colic.     TECHNIQUE: Grayscale and color Doppler ultrasound of the gallbladder was  performed.     COMPARISON: CT abdomen and pelvis without contrast 06/08/2021.      FINDINGS:        MEASUREMENTS:   Liver:  17.6 cm   Common bile duct diameter:  0.3 cm  Right kidney: 10.9 x 4.6 x 5.1 cm     LIVER: The liver is mildly enlarged and normal in echogenicity. The  echotexture is smooth. There is no intrahepatic mass or biliary ductal  dilatation. There is hepatopetal flow in the main portal vein. There is  small volume perihepatic fluid.     GALLBLADDER: There is cholelithiasis including a dominant 1.4 cm stone.  The gallbladder is poorly distended. There is no gallbladder wall  thickening. No sonographic Cornejo's sign was reported. There is no  extrahepatic biliary ductal dilatation.     PANCREAS: The pancreas is poorly seen and cannot be evaluated.     RIGHT KIDNEY: There is no hydronephrosis. There are renal sinus cysts in  the right kidney. No shadowing renal stone is visualized.        Impression:    Cholelithiasis. No biliary ductal dilatation. Mild hepatomegaly. Small  volume perihepatic fluid.     This report was finalized on 6/9/2021 7:54 PM by Dr. Mya Quiroz M.D.       Scheduled Medications  aspirin, 81 mg, Oral, Daily  atorvastatin, 40 mg, Oral, Daily  ferrous sulfate, 325 mg, Oral, Daily With Breakfast  hydrocortisone-bacitracin-zinc oxide-nystatin, 1 application, Topical, BID  insulin lispro, 0-9 Units, Subcutaneous, 4x Daily With Meals & Nightly  vancomycin, 125 mg, Oral, Q6H    Infusions  Pharmacy Consult,     Diet  Diet Regular; Cardiac, [place], Consistent Carbohydrate, Low Fiber / Low Residue       Assessment/Plan     Active Hospital Problems    Diagnosis  POA    **Clostridium difficile colitis [A04.72]  Yes    Sepsis without acute organ dysfunction (present on admit) [A41.9]  Yes    Cholelithiases [K80.20]  Yes    CORY (acute kidney injury) (CMS/HCC) [N17.9]  Yes    History of diverticulitis [Z87.19]  Not Applicable    Uncontrolled type 2 diabetes mellitus with hyperglycemia (CMS/HCC) [E11.65]  Yes    Chronic coronary artery disease [I25.10]  Yes    Type 2 diabetes with nephropathy  (CMS/HCC) [E11.21]  Yes    Stage 3b chronic kidney disease (CMS/[facility]) [N18.32]  Yes    Benign essential hypertension [I10]  Yes      Resolved Hospital Problems   No resolved problems to display.       65 y.o. male admitted with Clostridium difficile colitis.  CT and pelvis with diffuse colitis with thickening of distal transvers, descending, sigmoid and rectum.      Abdominal discomfort and diarrhea improved from admission but still present.  Exam relatively benign but WBC remains elevated.  Down slightly compared to yesterday.  Hopefully continues on this trend.  Will plan on monitoring at least 1 more night.  Continue oral vancomycin.  Consider ID consult if WBC remains elevated.  Will need colonoscopy in 6 to 8 weeks once healed from current bout of colitis.    Chronic post prandial abdominal pain  He describes what sounds like biliary colic ongoing for several months.  Cholelithiasis on CT scan and ultrasound.  Recommend HIDA scan as OP.     Anemia, chronic disease    hgb declined no overt bleeding likely partially dilutional decline. Monitor daily   Iron stores low, replace orally. He has never had screening colonoscopy. Needs outpatient colonoscopy in 6-8wk     CORY on CKD3b  Followed by Dr. Bravo.  Nephrology following.  Creat trending down with  Now close to baseline of 2.04 in January   Lisinopril still held and also hold at DC per renal.      CAD  Followed by Dr. Luque  Currently asymptomatic.  No evidence of CHF     DM2  Hold home regimen.  Correctional insulin.  Diabetic diet.  A1c is very good.     HTN  Hold ACE   BP acceptable.          SCDs for DVT prophylaxis.  Full code.  Discussed with patient, family and nursing staff.  Anticipate discharge home with family in 1-2 days.      Henok Velazquez MD  [facility]  06/11/21  09:46 EDT

## 2021-06-12 LAB
ALBUMIN SERPL-MCNC: 2.4 G/DL (ref 3.5–5.2)
ANION GAP SERPL CALCULATED.3IONS-SCNC: 8.6 MMOL/L (ref 5–15)
BUN SERPL-MCNC: 23 MG/DL (ref 8–23)
BUN/CREAT SERPL: 15.4 (ref 7–25)
CALCIUM SPEC-SCNC: 8.5 MG/DL (ref 8.6–10.5)
CHLORIDE SERPL-SCNC: 106 MMOL/L (ref 98–107)
CO2 SERPL-SCNC: 20.4 MMOL/L (ref 22–29)
CREAT SERPL-MCNC: 1.49 MG/DL (ref 0.76–1.27)
DEPRECATED RDW RBC AUTO: 44.4 FL (ref 37–54)
ERYTHROCYTE [DISTWIDTH] IN BLOOD BY AUTOMATED COUNT: 12.4 % (ref 12.3–15.4)
GFR SERPL CREATININE-BSD FRML MDRD: 47 ML/MIN/1.73
GLUCOSE BLDC GLUCOMTR-MCNC: 158 MG/DL (ref 70–130)
GLUCOSE BLDC GLUCOMTR-MCNC: 195 MG/DL (ref 70–130)
GLUCOSE BLDC GLUCOMTR-MCNC: 222 MG/DL (ref 70–130)
GLUCOSE BLDC GLUCOMTR-MCNC: 274 MG/DL (ref 70–130)
GLUCOSE SERPL-MCNC: 216 MG/DL (ref 65–99)
HCT VFR BLD AUTO: 32.9 % (ref 37.5–51)
HGB BLD-MCNC: 10.8 G/DL (ref 13–17.7)
MAGNESIUM SERPL-MCNC: 1.8 MG/DL (ref 1.6–2.4)
MCH RBC QN AUTO: 31.7 PG (ref 26.6–33)
MCHC RBC AUTO-ENTMCNC: 32.8 G/DL (ref 31.5–35.7)
MCV RBC AUTO: 96.5 FL (ref 79–97)
PHOSPHATE SERPL-MCNC: 3.1 MG/DL (ref 2.5–4.5)
PLATELET # BLD AUTO: 306 10*3/MM3 (ref 140–450)
PMV BLD AUTO: 10.1 FL (ref 6–12)
POTASSIUM SERPL-SCNC: 4.5 MMOL/L (ref 3.5–5.2)
RBC # BLD AUTO: 3.41 10*6/MM3 (ref 4.14–5.8)
SODIUM SERPL-SCNC: 135 MMOL/L (ref 136–145)
WBC # BLD AUTO: 18.68 10*3/MM3 (ref 3.4–10.8)

## 2021-06-12 PROCEDURE — 80069 RENAL FUNCTION PANEL: CPT | Performed by: INTERNAL MEDICINE

## 2021-06-12 PROCEDURE — 63710000001 INSULIN LISPRO (HUMAN) PER 5 UNITS: Performed by: NURSE PRACTITIONER

## 2021-06-12 PROCEDURE — 85027 COMPLETE CBC AUTOMATED: CPT | Performed by: HOSPITALIST

## 2021-06-12 PROCEDURE — G0378 HOSPITAL OBSERVATION PER HR: HCPCS

## 2021-06-12 PROCEDURE — 83735 ASSAY OF MAGNESIUM: CPT | Performed by: INTERNAL MEDICINE

## 2021-06-12 PROCEDURE — 82962 GLUCOSE BLOOD TEST: CPT

## 2021-06-12 RX ADMIN — ATORVASTATIN CALCIUM 40 MG: 20 TABLET, FILM COATED ORAL at 08:46

## 2021-06-12 RX ADMIN — INSULIN LISPRO 2 UNITS: 100 INJECTION, SOLUTION INTRAVENOUS; SUBCUTANEOUS at 08:46

## 2021-06-12 RX ADMIN — ZINC OXIDE 1 APPLICATION: 200 OINTMENT TOPICAL at 20:41

## 2021-06-12 RX ADMIN — VANCOMYCIN HYDROCHLORIDE 125 MG: 125 CAPSULE ORAL at 11:53

## 2021-06-12 RX ADMIN — INSULIN LISPRO 2 UNITS: 100 INJECTION, SOLUTION INTRAVENOUS; SUBCUTANEOUS at 20:41

## 2021-06-12 RX ADMIN — INSULIN LISPRO 4 UNITS: 100 INJECTION, SOLUTION INTRAVENOUS; SUBCUTANEOUS at 11:53

## 2021-06-12 RX ADMIN — FERROUS SULFATE TAB 325 MG (65 MG ELEMENTAL FE) 325 MG: 325 (65 FE) TAB at 08:46

## 2021-06-12 RX ADMIN — VANCOMYCIN HYDROCHLORIDE 125 MG: 125 CAPSULE ORAL at 17:26

## 2021-06-12 RX ADMIN — ZINC OXIDE 1 APPLICATION: 200 OINTMENT TOPICAL at 08:48

## 2021-06-12 RX ADMIN — VANCOMYCIN HYDROCHLORIDE 125 MG: 125 CAPSULE ORAL at 06:06

## 2021-06-12 RX ADMIN — INSULIN LISPRO 2 UNITS: 100 INJECTION, SOLUTION INTRAVENOUS; SUBCUTANEOUS at 17:26

## 2021-06-12 RX ADMIN — ASPIRIN 81 MG: 81 TABLET, FILM COATED ORAL at 08:46

## 2021-06-12 NOTE — PLAN OF CARE
Problem: Adult Inpatient Plan of Care  Goal: Plan of Care Review  Outcome: Ongoing, Progressing  Flowsheets (Taken 6/12/2021 1533)  Progress: improving  Plan of Care Reviewed With:  • patient  • daughter  Outcome Summary: VSS. C/o abdomen soreness, meds offered and declined.  No signs of distress. A&Ox4. RA. Up ad noam.  BM x3 this shift. Up to chair for half of the day. Afebrile this shift. Monitoring labs abd BG. Pt self-applies ointment to buttocks. Awaiting ID consult   Goal: Patient-Specific Goal (Individualized)  Outcome: Ongoing, Progressing  Goal: Absence of Hospital-Acquired Illness or Injury  Outcome: Ongoing, Progressing  Intervention: Identify and Manage Fall Risk  Recent Flowsheet Documentation  Taken 6/12/2021 1436 by Stephania Bloom, RN  Safety Promotion/Fall Prevention:  • activity supervised  • assistive device/personal items within reach  • clutter free environment maintained  • fall prevention program maintained  • nonskid shoes/slippers when out of bed  • room organization consistent  • safety round/check completed  Taken 6/12/2021 1200 by Stephania Bloom, RN  Safety Promotion/Fall Prevention:  • clutter free environment maintained  • assistive device/personal items within reach  • activity supervised  • nonskid shoes/slippers when out of bed  • room organization consistent  • safety round/check completed  Taken 6/12/2021 1003 by Stephania Bloom, RN  Safety Promotion/Fall Prevention:  • activity supervised  • assistive device/personal items within reach  • clutter free environment maintained  • nonskid shoes/slippers when out of bed  • safety round/check completed  • room organization consistent  • lighting adjusted  Taken 6/12/2021 0837 by Stephania Bloom, RN  Safety Promotion/Fall Prevention:  • activity supervised  • assistive device/personal items within reach  • clutter free environment maintained  • nonskid shoes/slippers when out of bed  • room organization  consistent  • safety round/check completed  • lighting adjusted  • fall prevention program maintained  Intervention: Prevent Skin Injury  Recent Flowsheet Documentation  Taken 6/12/2021 1436 by Stephania Bloom RN  Body Position: position changed independently  Skin Protection:  • adhesive use limited  • transparent dressing maintained  • tubing/devices free from skin contact  Taken 6/12/2021 1200 by Stephania Bloom RN  Body Position: position changed independently  Taken 6/12/2021 1003 by Stephania Bloom RN  Body Position: position changed independently  Taken 6/12/2021 0837 by Stephania Bloom RN  Body Position: position changed independently  Skin Protection:  • adhesive use limited  • transparent dressing maintained  • tubing/devices free from skin contact  Intervention: Prevent and Manage VTE (venous thromboembolism) Risk  Recent Flowsheet Documentation  Taken 6/12/2021 1436 by Stephania Bloom RN  VTE Prevention/Management:  • bilateral  • dorsiflexion/plantar flexion performed  Taken 6/12/2021 0837 by Stephania Bloom RN  VTE Prevention/Management:  • bilateral  • dorsiflexion/plantar flexion performed  Intervention: Prevent Infection  Recent Flowsheet Documentation  Taken 6/12/2021 1436 by Stephania Bloom RN  Infection Prevention:  • rest/sleep promoted  • single patient room provided  • visitors restricted/screened  • personal protective equipment utilized  Taken 6/12/2021 1200 by Stephania Bloom RN  Infection Prevention:  • visitors restricted/screened  • single patient room provided  • rest/sleep promoted  • personal protective equipment utilized  Taken 6/12/2021 1003 by Stephania Bloom RN  Infection Prevention:  • visitors restricted/screened  • single patient room provided  • rest/sleep promoted  • personal protective equipment utilized  Taken 6/12/2021 0837 by Stephania Bloom RN  Infection Prevention:  • visitors restricted/screened  •  single patient room provided  • rest/sleep promoted  • personal protective equipment utilized  Goal: Optimal Comfort and Wellbeing  Outcome: Ongoing, Progressing  Intervention: Provide Person-Centered Care  Recent Flowsheet Documentation  Taken 6/12/2021 1436 by Stephania Bloom RN  Trust Relationship/Rapport: care explained  Taken 6/12/2021 0837 by Stephania Bloom RN  Trust Relationship/Rapport: care explained  Goal: Readiness for Transition of Care  Outcome: Ongoing, Progressing     Problem: Skin Injury Risk Increased  Goal: Skin Health and Integrity  Outcome: Ongoing, Progressing  Intervention: Optimize Skin Protection  Recent Flowsheet Documentation  Taken 6/12/2021 1436 by Stephania Bloom RN  Pressure Reduction Techniques:  • weight shift assistance provided  • sit time limited to 2 hours  • positioned off wounds  • pressure points protected  Head of Bed (HOB): HOB elevated  Pressure Reduction Devices: positioning supports utilized  Skin Protection:  • adhesive use limited  • transparent dressing maintained  • tubing/devices free from skin contact  Taken 6/12/2021 1200 by Stephania Bloom RN  Head of Bed (HOB): HOB elevated  Taken 6/12/2021 1003 by Stephania Bloom RN  Head of Bed (HOB): HOB elevated  Taken 6/12/2021 0837 by Stephania Bloom RN  Pressure Reduction Techniques:  • weight shift assistance provided  • frequent weight shift encouraged  • heels elevated off bed  • pressure points protected  • sit time limited to 2 hours  • rest period provided between sit times  Head of Bed (HOB): HOB elevated  Pressure Reduction Devices: positioning supports utilized  Skin Protection:  • adhesive use limited  • transparent dressing maintained  • tubing/devices free from skin contact   Goal Outcome Evaluation:  Plan of Care Reviewed With: patient, daughter        Progress: improving  Outcome Summary: VSS. C/o abdomen soreness, meds offered and declined.  No signs of distress.  A&Ox4. RA. Up ad noam.  BM x3 this shift. Up to chair for half of the day. Afebrile this shift. Monitoring labs abd BG. Pt self-applies ointment to buttocks.

## 2021-06-12 NOTE — PROGRESS NOTES
[facility] Progress Note      Patient Name: Junior Pollard Jr.  : 1955  MRN: [id number]  Primary Care Physician:  Boogie Interiano MD  Date of admission: 2021    Subjective     Interval History:     Patient seen and examined this morning.  He is feeling tired.  Still has diarrhea but improving    Review of Systems:   14 point review of systems is otherwise negative except for mentioned above on HPI    Objective     Vitals:   Temp:  [98.6 °F (37 °C)-100.9 °F (38.3 °C)] 98.6 °F (37 °C)  Heart Rate:  [76-92] 82  Resp:  [16-20] 20  BP: (128-153)/(56-68) 131/57    Intake/Output Summary (Last 24 hours) at 2021 0813  Last data filed at 2021 1700  Gross per 24 hour   Intake 1080 ml   Output --   Net 1080 ml       Physical Exam:    General Appearance: alert, oriented x 3, no acute distress   Skin: warm and dry  HEENT: oral mucosa normal, nonicteric sclera  Neck: supple, no JVD  Lungs: CTA  Heart: RRR, normal S1 and S2  Abdomen: soft, nondistended, mild tenderness  Extremities: no edema, cyanosis or clubbing  Neuro: normal speech and mental status     Scheduled Meds:     aspirin, 81 mg, Oral, Daily  atorvastatin, 40 mg, Oral, Daily  ferrous sulfate, 325 mg, Oral, Daily With Breakfast  hydrocortisone-bacitracin-zinc oxide-nystatin, 1 application, Topical, BID  insulin lispro, 0-9 Units, Subcutaneous, 4x Daily With Meals & Nightly  vancomycin, 125 mg, Oral, Q6H      IV Meds:   Pharmacy Consult,         Results Reviewed:   I have personally reviewed the results from the time of this admission to 2021 08:13 EDT     Lab Results   Component Value Date    GLUCOSE 135 (H) 2021    CALCIUM 8.4 (L) 2021     2021    K 4.2 2021    CO2 20.7 (L) 2021     (H) 2021    BUN 33 (H) 2021    CREATININE 1.55 (H) 2021    EGFRIFAFRI 40 (L) 2021    EGFRIFNONA 45 (L) 2021    BCR 21.3 2021    ANIONGAP 7.3 2021       Lab Results   Component Value Date    CALCIUM 8.4 (L) 06/11/2021    PHOS 2.4 (L) 06/11/2021     Lab Results   Component Value Date    MG 1.8 06/10/2021      US Gallbladder    Result Date: 6/9/2021  PROCEDURE: US GALLBLADDER-  HISTORY: Biliary colic.  TECHNIQUE: Grayscale and color Doppler ultrasound of the gallbladder was performed.  COMPARISON: CT abdomen and pelvis without contrast 06/08/2021.  FINDINGS:  MEASUREMENTS: Liver:  17.6 cm Common bile duct diameter:  0.3 cm Right kidney: 10.9 x 4.6 x 5.1 cm  LIVER: The liver is mildly enlarged and normal in echogenicity. The echotexture is smooth. There is no intrahepatic mass or biliary ductal dilatation. There is hepatopetal flow in the main portal vein. There is small volume perihepatic fluid.  GALLBLADDER: There is cholelithiasis including a dominant 1.4 cm stone. The gallbladder is poorly distended. There is no gallbladder wall thickening. No sonographic Cornejo's sign was reported. There is no extrahepatic biliary ductal dilatation.  PANCREAS: The pancreas is poorly seen and cannot be evaluated.  RIGHT KIDNEY: There is no hydronephrosis. There are renal sinus cysts in the right kidney. No shadowing renal stone is visualized.       Impression:  Cholelithiasis. No biliary ductal dilatation. Mild hepatomegaly. Small volume perihepatic fluid.  This report was finalized on 6/9/2021 7:54 PM by Dr. May Quiroz M.D.         Assessment / Plan     ASSESSMENT:  1. CORY on CKD III, baseline 1.5-1.7mg/dL.  Likely due to prerenal state from volume depletion and hypotension in the setting of ongoing diarrhea.   Creatinine at baseline per yesterday's labs.  Labs pending from this morning  2. C. Dif colitis, currently on p.o. Vanco, clinically improving  3. History of HTN  5. Anemia    PLAN:  1.  Okay to stay off IV fluids  2.  Continue to hold lisinopril  3.  Otherwise okay to DC from renal standpoint with outpatient follow-up.    Thank you for involving us in the care of  Junior Pollard Jr..  Please feel free to call with any questions.    Andrés Swain MD  06/12/21  08:13 EDT    [facility]  [phone]

## 2021-06-12 NOTE — PLAN OF CARE
Goal Outcome Evaluation:  Plan of Care Reviewed With: patient        Progress: improving  Outcome Summary: Pt ambulatory and does for self. Pt states that GI symptoms are improving, are less frequent and pain isn't as intense. Pain relieved with PO pain medication. Pt rested comfortably throughout the night. VSS with no acute changes.

## 2021-06-12 NOTE — PROGRESS NOTES
Name: Junior Pollard Jr. ADMIT: 2021   : 1955  PCP: Boogie Interiano MD    MRN: [id number] LOS: 3 days   AGE/SEX: 65 y.o. male  ROOM: [facility]     Subjective   Subjective   Patient seen at bedside.       Objective   Objective   Vital Signs  Temp:  [98.6 °F (37 °C)-100.9 °F (38.3 °C)] 98.8 °F (37.1 °C)  Heart Rate:  [82-92] 83  Resp:  [16-20] 16  BP: (128-138)/(57-68) 136/57  SpO2:  [97 %] 97 %  on   ;   Device (Oxygen Therapy): room air  Body mass index is 34.81 kg/m².  Physical Exam   General, awake and alert.  Head and ENT, normocephalic and atraumatic.  Lungs, symmetric expansion, equal air entry bilaterally.  Heart, regular rate and rhythm.  Abdomen, obese, mild tenderness.  Extremities, no clubbing or cyanosis.  Neuro, no focal deficits.  Skin: Warm and no rash.  Psych, normal mood and affect.  Musculoskeletal, joint examination is grossly normal.    Results Review     I reviewed the patient's new clinical results.  Results from last 7 days   Lab Units 21  1030 21  0536 06/10/21  0514 21  0538   WBC 10*3/mm3 18.68* 15.79* 17.29* 16.56*   HEMOGLOBIN g/dL 10.8* 10.2* 10.7* 10.2*   PLATELETS 10*3/mm3 306 299 304 261     Results from last 7 days   Lab Units 21  1030 21  0536 06/10/21  0514 21  0538   SODIUM mmol/L 135* 139 139 135*   POTASSIUM mmol/L 4.5 4.2 4.1 4.0   CHLORIDE mmol/L 106 111* 108* 106   CO2 mmol/L 20.4* 20.7* 22.5 21.9*   BUN mg/dL 23 33* 43* 47*   CREATININE mg/dL 1.49* 1.55* 1.97* 2.99*   GLUCOSE mg/dL 216* 135* 142* 87   Estimated Creatinine Clearance: 63.3 mL/min (A) (by C-G formula based on SCr of 1.49 mg/dL (H)).  Results from last 7 days   Lab Units 21  1030 21  0536 06/10/21  0514 21  0538 21  0125   ALBUMIN g/dL 2.40* 2.60* 3.30* 2.70* 3.90   BILIRUBIN mg/dL  --   --  0.2  --  0.5   ALK PHOS U/L  --   --  66  --  64   AST (SGOT) U/L  --   --  17  --  32   ALT (SGPT) U/L  --   --  27  --  42*     Results from last  7 days   Lab Units 06/12/21  1030 06/11/21  0536 06/10/21  0514 06/09/21  0538   CALCIUM mg/dL 8.5* 8.4* 8.1* 8.0*   ALBUMIN g/dL 2.40* 2.60* 3.30* 2.70*   MAGNESIUM mg/dL 1.8  --  1.8 1.7   PHOSPHORUS mg/dL 3.1 2.4* 2.9 3.0       COVID19   Date Value Ref Range Status   06/08/2021 Not Detected Not Detected - Ref. Range Final     Glucose   Date/Time Value Ref Range Status   06/12/2021 1603 195 (H) 70 - 130 mg/dL Final   06/12/2021 1142 222 (H) 70 - 130 mg/dL Final   06/12/2021 0630 158 (H) 70 - 130 mg/dL Final   06/11/2021 2012 236 (H) 70 - 130 mg/dL Final   06/11/2021 1640 262 (H) 70 - 130 mg/dL Final   06/11/2021 1116 211 (H) 70 - 130 mg/dL Final   06/11/2021 0559 120 70 - 130 mg/dL Final       US Gallbladder  Narrative: PROCEDURE: US GALLBLADDER-     HISTORY: Biliary colic.     TECHNIQUE: Grayscale and color Doppler ultrasound of the gallbladder was  performed.     COMPARISON: CT abdomen and pelvis without contrast 06/08/2021.      FINDINGS:     MEASUREMENTS:   Liver:  17.6 cm   Common bile duct diameter:  0.3 cm  Right kidney: 10.9 x 4.6 x 5.1 cm     LIVER: The liver is mildly enlarged and normal in echogenicity. The  echotexture is smooth. There is no intrahepatic mass or biliary ductal  dilatation. There is hepatopetal flow in the main portal vein. There is  small volume perihepatic fluid.     GALLBLADDER: There is cholelithiasis including a dominant 1.4 cm stone.  The gallbladder is poorly distended. There is no gallbladder wall  thickening. No sonographic Cornejo's sign was reported. There is no  extrahepatic biliary ductal dilatation.     PANCREAS: The pancreas is poorly seen and cannot be evaluated.     RIGHT KIDNEY: There is no hydronephrosis. There are renal sinus cysts in  the right kidney. No shadowing renal stone is visualized.        Impression:    Cholelithiasis. No biliary ductal dilatation. Mild hepatomegaly. Small  volume perihepatic fluid.     This report was finalized on 6/9/2021 7:54 PM by   May Quiroz M.D.       Scheduled Medications  aspirin, 81 mg, Oral, Daily  atorvastatin, 40 mg, Oral, Daily  ferrous sulfate, 325 mg, Oral, Daily With Breakfast  hydrocortisone-bacitracin-zinc oxide-nystatin, 1 application, Topical, BID  insulin lispro, 0-9 Units, Subcutaneous, 4x Daily With Meals & Nightly  vancomycin, 125 mg, Oral, Q6H    Infusions  Pharmacy Consult,     Diet  Diet Regular; Consistent Carbohydrate, Low Fiber / Low Residue       Assessment/Plan     Active Hospital Problems    Diagnosis  POA   • **Clostridium difficile colitis [A04.72]  Yes   • Sepsis without acute organ dysfunction (present on admit) [A41.9]  Yes   • Cholelithiases [K80.20]  Yes   • CORY (acute kidney injury) (CMS/[facility]) [N17.9]  Yes   • History of diverticulitis [Z87.19]  Not Applicable   • Colitis [K52.9]  Yes   • Uncontrolled type 2 diabetes mellitus with hyperglycemia (CMS/[facility]) [E11.65]  Yes   • Chronic coronary artery disease [I25.10]  Yes   • Type 2 diabetes with nephropathy (CMS/[facility]) [E11.21]  Yes   • Stage 3b chronic kidney disease (CMS/[facility]) [N18.32]  Yes   • Benign essential hypertension [I10]  Yes      Resolved Hospital Problems   No resolved problems to display.       65 y.o. male admitted with Clostridium difficile colitis.    Assessment and plan:  1.  C. difficile colitis, WBCs have trended up, consult ID today.  Continue oral vancomycin.    2.  Anemia of chronic disease, hemoglobin noted to be stable.  Continue to recheck labs.    3.  Acute on chronic kidney injury, renal function is improving.  Continue to recheck labs, avoid nephrotoxic medications.    4.  Coronary artery disease, continue aspirin and statin therapy.    5.  Diabetes mellitus, continue Accu-Cheks and sliding scale insulin coverage.    6.  Essential hypertension, BP currently in stable range.  Continue to monitor.    7.  CODE STATUS is full code.  Further plans based on hospital course.      Jaden Chao MD  [facility]  06/12/21  16:38 EDT

## 2021-06-13 LAB
ALBUMIN SERPL-MCNC: 2.7 G/DL (ref 3.5–5.2)
ANION GAP SERPL CALCULATED.3IONS-SCNC: 8.8 MMOL/L (ref 5–15)
BASOPHILS # BLD AUTO: 0.11 10*3/MM3 (ref 0–0.2)
BASOPHILS NFR BLD AUTO: 0.6 % (ref 0–1.5)
BUN SERPL-MCNC: 21 MG/DL (ref 8–23)
BUN/CREAT SERPL: 16.2 (ref 7–25)
CALCIUM SPEC-SCNC: 8.5 MG/DL (ref 8.6–10.5)
CHLORIDE SERPL-SCNC: 107 MMOL/L (ref 98–107)
CO2 SERPL-SCNC: 21.2 MMOL/L (ref 22–29)
CREAT SERPL-MCNC: 1.3 MG/DL (ref 0.76–1.27)
DEPRECATED RDW RBC AUTO: 42.8 FL (ref 37–54)
EOSINOPHIL # BLD AUTO: 0.89 10*3/MM3 (ref 0–0.4)
EOSINOPHIL NFR BLD AUTO: 4.9 % (ref 0.3–6.2)
ERYTHROCYTE [DISTWIDTH] IN BLOOD BY AUTOMATED COUNT: 12.3 % (ref 12.3–15.4)
GFR SERPL CREATININE-BSD FRML MDRD: 55 ML/MIN/1.73
GLUCOSE BLDC GLUCOMTR-MCNC: 178 MG/DL (ref 70–130)
GLUCOSE BLDC GLUCOMTR-MCNC: 185 MG/DL (ref 70–130)
GLUCOSE BLDC GLUCOMTR-MCNC: 262 MG/DL (ref 70–130)
GLUCOSE BLDC GLUCOMTR-MCNC: 367 MG/DL (ref 70–130)
GLUCOSE SERPL-MCNC: 170 MG/DL (ref 65–99)
HCT VFR BLD AUTO: 32.1 % (ref 37.5–51)
HGB BLD-MCNC: 10.5 G/DL (ref 13–17.7)
IMM GRANULOCYTES # BLD AUTO: 0.2 10*3/MM3 (ref 0–0.05)
IMM GRANULOCYTES NFR BLD AUTO: 1.1 % (ref 0–0.5)
LYMPHOCYTES # BLD AUTO: 1.8 10*3/MM3 (ref 0.7–3.1)
LYMPHOCYTES NFR BLD AUTO: 10 % (ref 19.6–45.3)
MCH RBC QN AUTO: 30.9 PG (ref 26.6–33)
MCHC RBC AUTO-ENTMCNC: 32.7 G/DL (ref 31.5–35.7)
MCV RBC AUTO: 94.4 FL (ref 79–97)
MONOCYTES # BLD AUTO: 1.49 10*3/MM3 (ref 0.1–0.9)
MONOCYTES NFR BLD AUTO: 8.3 % (ref 5–12)
NEUTROPHILS NFR BLD AUTO: 13.5 10*3/MM3 (ref 1.7–7)
NEUTROPHILS NFR BLD AUTO: 75.1 % (ref 42.7–76)
NRBC BLD AUTO-RTO: 0 /100 WBC (ref 0–0.2)
PHOSPHATE SERPL-MCNC: 3.2 MG/DL (ref 2.5–4.5)
PLATELET # BLD AUTO: 370 10*3/MM3 (ref 140–450)
PMV BLD AUTO: 9.9 FL (ref 6–12)
POTASSIUM SERPL-SCNC: 4.3 MMOL/L (ref 3.5–5.2)
RBC # BLD AUTO: 3.4 10*6/MM3 (ref 4.14–5.8)
SODIUM SERPL-SCNC: 137 MMOL/L (ref 136–145)
WBC # BLD AUTO: 17.99 10*3/MM3 (ref 3.4–10.8)

## 2021-06-13 PROCEDURE — 99223 1ST HOSP IP/OBS HIGH 75: CPT | Performed by: INTERNAL MEDICINE

## 2021-06-13 PROCEDURE — 85025 COMPLETE CBC W/AUTO DIFF WBC: CPT | Performed by: INTERNAL MEDICINE

## 2021-06-13 PROCEDURE — 80069 RENAL FUNCTION PANEL: CPT | Performed by: INTERNAL MEDICINE

## 2021-06-13 PROCEDURE — G0378 HOSPITAL OBSERVATION PER HR: HCPCS

## 2021-06-13 PROCEDURE — 82962 GLUCOSE BLOOD TEST: CPT

## 2021-06-13 PROCEDURE — 63710000001 INSULIN LISPRO (HUMAN) PER 5 UNITS: Performed by: NURSE PRACTITIONER

## 2021-06-13 RX ADMIN — ZINC OXIDE 1 APPLICATION: 200 OINTMENT TOPICAL at 08:04

## 2021-06-13 RX ADMIN — INSULIN LISPRO 8 UNITS: 100 INJECTION, SOLUTION INTRAVENOUS; SUBCUTANEOUS at 17:14

## 2021-06-13 RX ADMIN — ZINC OXIDE 1 APPLICATION: 200 OINTMENT TOPICAL at 20:01

## 2021-06-13 RX ADMIN — INSULIN LISPRO 2 UNITS: 100 INJECTION, SOLUTION INTRAVENOUS; SUBCUTANEOUS at 08:04

## 2021-06-13 RX ADMIN — VANCOMYCIN HYDROCHLORIDE 125 MG: 125 CAPSULE ORAL at 17:14

## 2021-06-13 RX ADMIN — VANCOMYCIN HYDROCHLORIDE 125 MG: 125 CAPSULE ORAL at 12:20

## 2021-06-13 RX ADMIN — VANCOMYCIN HYDROCHLORIDE 125 MG: 125 CAPSULE ORAL at 05:36

## 2021-06-13 RX ADMIN — VANCOMYCIN HYDROCHLORIDE 125 MG: 125 CAPSULE ORAL at 00:02

## 2021-06-13 RX ADMIN — FERROUS SULFATE TAB 325 MG (65 MG ELEMENTAL FE) 325 MG: 325 (65 FE) TAB at 08:04

## 2021-06-13 RX ADMIN — INSULIN LISPRO 6 UNITS: 100 INJECTION, SOLUTION INTRAVENOUS; SUBCUTANEOUS at 12:20

## 2021-06-13 RX ADMIN — INSULIN LISPRO 2 UNITS: 100 INJECTION, SOLUTION INTRAVENOUS; SUBCUTANEOUS at 22:00

## 2021-06-13 RX ADMIN — ATORVASTATIN CALCIUM 40 MG: 20 TABLET, FILM COATED ORAL at 08:04

## 2021-06-13 RX ADMIN — ASPIRIN 81 MG: 81 TABLET, FILM COATED ORAL at 08:04

## 2021-06-13 NOTE — CONSULTS
Referring Provider: Dr Chao    Reason for Consultation: C diff    History of present illness:  Junior Pollard Jr. is a 65 y.o. who I am asked to evaluate and give opinion for C diff. History is obtained from the patient, his daughter, and review of the old medical records which I summarize/synthesize as follows: He presented to the ER on 6/8/21 with sudden onset diarrhea (~7 episodes per day) that had been going on for several days. No blood in the stool or emesis. He had associated diffuse abdominal cramping w/o alleviating factors. No recent antibiotics, PPIs or NSAIDs. The last time he took antibiotics was back in October 2020 for diverticulitis. No one at home has had similar symptoms.     In the ER he was afebrile but tachycardic. CT A/P showed non-specific colitis and C diff PCR was positive so he has been started on PO vancomycin. His WBC at admission was 15k and Crt 3.7. While his creatinine has improved to 1.3, his WBC has remained elevated in the mid-teens so ID has been consulted to make further recommendations. He did have a fever on 6/11 at 2314 but none prior to or since that time. In review of the MAR, it does not appear that he has missed any doses of his oral vancomycin 125 mg PO q6h regimen.     Overall he is feeling improved compared to admission. His BMs are less frequent and his abdominal pain is less severe.     Past Medical History:   Diagnosis Date   • Arthritis    • Diabetes mellitus (CMS/[facility])    • Diabetic eye exam (CMS/[facility]) 01/24/2017    DUE   • Hyperlipidemia    • Hypertension        Past Surgical History:   Procedure Laterality Date   • FINGER SURGERY Right     Fourth Digit.       Social History:    Retired     Family History:  No 1st degree relatives w/ C diff    Antibiotic allergies and intolerances:  None    Medications:    Current Facility-Administered Medications:   •  acetaminophen (TYLENOL) tablet 650 mg, 650 mg, Oral, Q4H PRN, 650 mg at 06/09/21 0916 **OR**  [DISCONTINUED] acetaminophen (TYLENOL) 160 MG/5ML solution 650 mg, 650 mg, Oral, Q4H PRN **OR** [DISCONTINUED] acetaminophen (TYLENOL) suppository 650 mg, 650 mg, Rectal, Q4H PRN, Tierra Pearson, APRN  •  aspirin EC tablet 81 mg, 81 mg, Oral, Daily, Amanda Ross APRN, 81 mg at 06/13/21 0804  •  atorvastatin (LIPITOR) tablet 40 mg, 40 mg, Oral, Daily, Amanda Ross APRN, 40 mg at 06/13/21 0804  •  dextrose (D50W) 25 g/ 50mL Intravenous Solution 25 g, 25 g, Intravenous, Q15 Min PRN, Amanda Ross APRN  •  dextrose (GLUTOSE) oral gel 15 g, 15 g, Oral, Q15 Min PRN, Amanda Ross APRN  •  ferrous sulfate tablet 325 mg, 325 mg, Oral, Daily With Breakfast, Amanda Ross APRN, 325 mg at 06/13/21 0804  •  glucagon (human recombinant) (GLUCAGEN DIAGNOSTIC) injection 1 mg, 1 mg, Subcutaneous, Q15 Min PRN, Amanda Ross APRN  •  HYDROcodone-acetaminophen (NORCO) 5-325 MG per tablet 1 tablet, 1 tablet, Oral, Q6H PRN, Amanda Ross APRN, 1 tablet at 06/11/21 2356  •  hydrocortisone-bacitracin-zinc oxide-nystatin (MAGIC BARRIER) ointment 1 application, 1 application, Topical, BID, Henok Velazquez MD, 1 application at 06/13/21 0804  •  hyoscyamine (LEVSIN) SL tablet 125 mcg, 125 mcg, Sublingual, Q4H PRN, Amanda Ross APRN, 125 mcg at 06/09/21 1513  •  insulin lispro (ADMELOG) injection 0-9 Units, 0-9 Units, Subcutaneous, 4x Daily With Meals & Nightly, Amanda Ross APRN, 2 Units at 06/13/21 0804  •  nitroglycerin (NITROSTAT) SL tablet 0.4 mg, 0.4 mg, Sublingual, Q5 Min PRN, Tierra Pearson APRN  •  ondansetron (ZOFRAN) tablet 4 mg, 4 mg, Oral, Q6H PRN **OR** ondansetron (ZOFRAN) injection 4 mg, 4 mg, Intravenous, Q6H PRN, Tierra Pearson APRN  •  Pharmacy Consult, , Does not apply, Continuous PRN, Amanda Ross, BERNADETTE  •  vancomycin (VANCOCIN) capsule 125 mg, 125 mg, Oral, Q6H, Amanda Ross, APRN, 125 mg at 06/13/21 0536    Review of  Systems  All systems were reviewed and are negative unless otherwise stated above in the HPI    Objective   Vital Signs   Temp:  [98.8 °F (37.1 °C)-99.5 °F (37.5 °C)] 98.9 °F (37.2 °C)  Heart Rate:  [70-83] 70  Resp:  [16-18] 18  BP: (125-156)/(55-64) 125/55    Physical Exam:   General: awake, alert, NAD   Head: atraumatic  Eyes: no scleral icterus  Neck: Supple  Cardiovascular: NR, RR, no murmurs  Respiratory: Lungs are clear to auscultation bilaterally, no rales or wheezing; normal work of breathing on ambient air  GI: Abdomen is moderately distended and tender; normal bowel sounds  :  no Palacio catheter  Musculoskeletal: no joint effusions, normal musculature  Skin: No rashes, lesions, or embolic phenomenon  Neurological: Alert and oriented x 3, motor strength 5/5 in all four extremities  Psychiatric: Normal mood and affect   Vasc: no cyanosis; PIV w/o erythema    Labs:     Lab Results   Component Value Date    WBC 17.99 (H) 06/13/2021    HGB 10.5 (L) 06/13/2021    HCT 32.1 (L) 06/13/2021    MCV 94.4 06/13/2021     06/13/2021       Lab Results   Component Value Date    GLUCOSE 170 (H) 06/13/2021    BUN 21 06/13/2021    CREATININE 1.30 (H) 06/13/2021    EGFRIFNONA 55 (L) 06/13/2021    EGFRIFAFRI 40 (L) 01/06/2021    BCR 16.2 06/13/2021    CO2 21.2 (L) 06/13/2021    CALCIUM 8.5 (L) 06/13/2021    PROTENTOTREF 7.9 04/08/2019    ALBUMIN 2.70 (L) 06/13/2021    LABIL2 1.5 04/08/2019    AST 17 06/10/2021    ALT 27 06/10/2021     Lab Results   Component Value Date    HGBA1C 5.40 06/08/2021     UA 3-5 WBCs    Microbiology:  6/8 C diff; positive  6/8 GI PCR: negative  6/8 COVID: negative    Radiology (personally reviewed images and report):  RUQ US wit cholelithiasis    CT A/P: non-specific colitis    Assessment/Plan   1. C difficile colitis  2. CORY on CKD 3 - improved  3. Anemia of chronic disease  4. Leukocytosis    Continue vancomycin 125 mg PO q6h x 10 day course. Clinically he is improved, and I suspect his  "leukocytosis will soon follow. We discussed starting a probiotic AFTER he finishes his treatment course. We discussed risk of recurrent C diff and to avoid unnecessary antibiotics in the future. Repeat CBC in the AM. If trending down tomorrow and symptoms are better then no objection to discharge at that time from my standpoint. We can get him PO vancomycin through the \"meds to bed\" program at that time.     ID will follow. D/W RN re: plan.    "

## 2021-06-13 NOTE — PROGRESS NOTES
Name: Junior Pollard Jr. ADMIT: 2021   : 1955  PCP: Boogie Interiano MD    MRN: [id number] LOS: 3 days   AGE/SEX: 65 y.o. male  ROOM: [facility]     Subjective   Subjective   Patient is seen at bedside.       Objective   Objective   Vital Signs  Temp:  [98.9 °F (37.2 °C)-99.5 °F (37.5 °C)] 98.9 °F (37.2 °C)  Heart Rate:  [70-71] 70  Resp:  [18] 18  BP: (125-156)/(55-64) 125/55  SpO2:  [96 %-97 %] 97 %  on   ;   Device (Oxygen Therapy): room air  Body mass index is 34.41 kg/m².  Physical Exam  General, awake and alert.  Head and ENT, normocephalic and atraumatic.  Lungs, symmetric expansion, equal air entry bilaterally.  Heart, regular rate and rhythm.  Abdomen, obese, mild tenderness.  Extremities, no clubbing or cyanosis.  Neuro, no focal deficits.  Skin: Warm and no rash.  Psych, normal mood and affect.  Musculoskeletal, joint examination is grossly normal.    Results Review     I reviewed the patient's new clinical results.  Results from last 7 days   Lab Units 21  0705 06/12/21  1030 06/11/21  0536 06/10/21  0514   WBC 10*3/mm3 17.99* 18.68* 15.79* 17.29*   HEMOGLOBIN g/dL 10.5* 10.8* 10.2* 10.7*   PLATELETS 10*3/mm3 370 306 299 304     Results from last 7 days   Lab Units 21  0721  0536 06/10/21  0514   SODIUM mmol/L 137 135* 139 139   POTASSIUM mmol/L 4.3 4.5 4.2 4.1   CHLORIDE mmol/L 107 106 111* 108*   CO2 mmol/L 21.2* 20.4* 20.7* 22.5   BUN mg/dL  33* 43*   CREATININE mg/dL 1.30* 1.49* 1.55* 1.97*   GLUCOSE mg/dL 170* 216* 135* 142*   Estimated Creatinine Clearance: 72.2 mL/min (A) (by C-G formula based on SCr of 1.3 mg/dL (H)).  Results from last 7 days   Lab Units 21  0705 21  1030 21  0536 06/10/21  0514 21  0125   ALBUMIN g/dL 2.70* 2.40* 2.60* 3.30* 3.90   BILIRUBIN mg/dL  --   --   --  0.2 0.5   ALK PHOS U/L  --   --   --  66 64   AST (SGOT) U/L  --   --   --  17 32   ALT (SGPT) U/L  --   --   --  27 42*     Results from  last 7 days   Lab Units 06/13/21  0705 06/12/21  1030 06/11/21  0536 06/10/21  0514 06/09/21  0538   CALCIUM mg/dL 8.5* 8.5* 8.4* 8.1* 8.0*   ALBUMIN g/dL 2.70* 2.40* 2.60* 3.30* 2.70*   MAGNESIUM mg/dL  --  1.8  --  1.8 1.7   PHOSPHORUS mg/dL 3.2 3.1 2.4* 2.9 3.0       COVID19   Date Value Ref Range Status   06/08/2021 Not Detected Not Detected - Ref. Range Final     Glucose   Date/Time Value Ref Range Status   06/13/2021 1132 262 (H) 70 - 130 mg/dL Final   06/13/2021 0615 178 (H) 70 - 130 mg/dL Final   06/12/2021 2101 274 (H) 70 - 130 mg/dL Final   06/12/2021 1603 195 (H) 70 - 130 mg/dL Final   06/12/2021 1142 222 (H) 70 - 130 mg/dL Final   06/12/2021 0630 158 (H) 70 - 130 mg/dL Final   06/11/2021 2012 236 (H) 70 - 130 mg/dL Final       US Gallbladder  Narrative: PROCEDURE: US GALLBLADDER-     HISTORY: Biliary colic.     TECHNIQUE: Grayscale and color Doppler ultrasound of the gallbladder was  performed.     COMPARISON: CT abdomen and pelvis without contrast 06/08/2021.      FINDINGS:     MEASUREMENTS:   Liver:  17.6 cm   Common bile duct diameter:  0.3 cm  Right kidney: 10.9 x 4.6 x 5.1 cm     LIVER: The liver is mildly enlarged and normal in echogenicity. The  echotexture is smooth. There is no intrahepatic mass or biliary ductal  dilatation. There is hepatopetal flow in the main portal vein. There is  small volume perihepatic fluid.     GALLBLADDER: There is cholelithiasis including a dominant 1.4 cm stone.  The gallbladder is poorly distended. There is no gallbladder wall  thickening. No sonographic Cornejo's sign was reported. There is no  extrahepatic biliary ductal dilatation.     PANCREAS: The pancreas is poorly seen and cannot be evaluated.     RIGHT KIDNEY: There is no hydronephrosis. There are renal sinus cysts in  the right kidney. No shadowing renal stone is visualized.        Impression:    Cholelithiasis. No biliary ductal dilatation. Mild hepatomegaly. Small  volume perihepatic fluid.     This  report was finalized on 6/9/2021 7:54 PM by Dr. May Quiroz M.D.       Scheduled Medications  aspirin, 81 mg, Oral, Daily  atorvastatin, 40 mg, Oral, Daily  ferrous sulfate, 325 mg, Oral, Daily With Breakfast  hydrocortisone-bacitracin-zinc oxide-nystatin, 1 application, Topical, BID  insulin lispro, 0-9 Units, Subcutaneous, 4x Daily With Meals & Nightly  vancomycin, 125 mg, Oral, Q6H    Infusions  Pharmacy Consult,     Diet  Diet Regular; Consistent Carbohydrate, Low Fiber / Low Residue       Assessment/Plan     Active Hospital Problems    Diagnosis  POA   • **Clostridium difficile colitis [A04.72]  Yes   • Sepsis without acute organ dysfunction (present on admit) [A41.9]  Yes   • Cholelithiases [K80.20]  Yes   • CORY (acute kidney injury) (CMS/[facility]) [N17.9]  Yes   • History of diverticulitis [Z87.19]  Not Applicable   • Uncontrolled type 2 diabetes mellitus with hyperglycemia (CMS/[facility]) [E11.65]  Yes   • Chronic coronary artery disease [I25.10]  Yes   • Type 2 diabetes with nephropathy (CMS/[facility]) [E11.21]  Yes   • Stage 3b chronic kidney disease (CMS/[facility]) [N18.32]  Yes   • Benign essential hypertension [I10]  Yes      Resolved Hospital Problems   No resolved problems to display.       65 y.o. male admitted with Clostridium difficile colitis.    Assessment and plan:  1.  C. difficile colitis, continue oral vancomycin.  WBCs have trended down very slightly.  ID was consulted, plans to repeat labs in the morning. If WBCs are trending down, may be discharged home tomorrow.     2.  Anemia of chronic disease, hemoglobin noted to be stable.  Continue to recheck labs.     3.  Acute on chronic kidney injury, renal function is improving.  Continue to recheck labs, avoid nephrotoxic medications.     4.  Coronary artery disease, continue aspirin and statin therapy.     5.  Diabetes mellitus, continue Accu-Cheks and sliding scale insulin coverage.     6.  Essential hypertension, BP currently in stable range.  Continue to  monitor.     7.  CODE STATUS is full code.  Further plans based on hospital course.      Jaden Chao MD  [facility]  06/13/21  14:44 EDT

## 2021-06-13 NOTE — PLAN OF CARE
Problem: Adult Inpatient Plan of Care  Goal: Plan of Care Review  Outcome: Ongoing, Progressing  Flowsheets (Taken 6/13/2021 1539)  Progress: improving  Plan of Care Reviewed With:   patient   daughter  Outcome Summary: VSS, no c/o pain, no signs of distress, remains RA. A&Ox4. BM x4. Up ad noam. Afebrile. Monitoring BG and dosing accordingly. Monitoring WBC. Possible home tomorrow. Family updated at bedside.  Goal: Patient-Specific Goal (Individualized)  Outcome: Ongoing, Progressing  Goal: Absence of Hospital-Acquired Illness or Injury  Outcome: Ongoing, Progressing  Intervention: Identify and Manage Fall Risk  Recent Flowsheet Documentation  Taken 6/13/2021 1440 by Stephania Bloom, RN  Safety Promotion/Fall Prevention:   activity supervised   assistive device/personal items within reach   clutter free environment maintained   fall prevention program maintained   nonskid shoes/slippers when out of bed   room organization consistent   safety round/check completed  Taken 6/13/2021 1222 by Stephania Bloom, RN  Safety Promotion/Fall Prevention:   activity supervised   assistive device/personal items within reach   clutter free environment maintained   fall prevention program maintained   nonskid shoes/slippers when out of bed   room organization consistent   safety round/check completed  Taken 6/13/2021 1012 by Stephania Bloom, RN  Safety Promotion/Fall Prevention:   activity supervised   assistive device/personal items within reach   clutter free environment maintained   nonskid shoes/slippers when out of bed   room organization consistent   safety round/check completed   lighting adjusted  Taken 6/13/2021 0806 by Stephania Bloom, RN  Safety Promotion/Fall Prevention:   activity supervised   assistive device/personal items within reach   clutter free environment maintained   fall prevention program maintained   lighting adjusted   room organization consistent   safety round/check  completed  Intervention: Prevent Skin Injury  Recent Flowsheet Documentation  Taken 6/13/2021 1440 by Stephania Bloom RN  Body Position: position changed independently  Skin Protection:   adhesive use limited   transparent dressing maintained   tubing/devices free from skin contact  Taken 6/13/2021 1222 by Setphania Bloom RN  Body Position: position maintained  Taken 6/13/2021 1012 by Stephania Bloom RN  Body Position: position changed independently  Taken 6/13/2021 0806 by Stephania Bloom RN  Body Position: position changed independently  Skin Protection:   adhesive use limited   transparent dressing maintained   tubing/devices free from skin contact  Intervention: Prevent and Manage VTE (venous thromboembolism) Risk  Recent Flowsheet Documentation  Taken 6/13/2021 1440 by Stephania Bloom RN  VTE Prevention/Management:   bilateral   dorsiflexion/plantar flexion performed  Taken 6/13/2021 0806 by Stephania Bloom RN  VTE Prevention/Management:   bilateral   dorsiflexion/plantar flexion performed  Intervention: Prevent Infection  Recent Flowsheet Documentation  Taken 6/13/2021 1440 by Stephania Bloom RN  Infection Prevention:   visitors restricted/screened   single patient room provided   rest/sleep promoted   personal protective equipment utilized  Taken 6/13/2021 1222 by Stephania Bloom RN  Infection Prevention:   visitors restricted/screened   single patient room provided   rest/sleep promoted   personal protective equipment utilized  Taken 6/13/2021 1012 by Stephania Bloom RN  Infection Prevention:   visitors restricted/screened   single patient room provided   personal protective equipment utilized   rest/sleep promoted  Taken 6/13/2021 0806 by Stephania Bloom RN  Infection Prevention:   visitors restricted/screened   single patient room provided   rest/sleep promoted   personal protective equipment utilized  Goal: Optimal Comfort and  Wellbeing  Outcome: Ongoing, Progressing  Intervention: Provide Person-Centered Care  Recent Flowsheet Documentation  Taken 6/13/2021 1440 by Stephania Bloom RN  Trust Relationship/Rapport:   care explained   thoughts/feelings acknowledged  Taken 6/13/2021 0806 by Stephania Bloom RN  Trust Relationship/Rapport: care explained  Goal: Readiness for Transition of Care  Outcome: Ongoing, Progressing     Problem: Skin Injury Risk Increased  Goal: Skin Health and Integrity  Outcome: Ongoing, Progressing  Intervention: Optimize Skin Protection  Recent Flowsheet Documentation  Taken 6/13/2021 1440 by Stephania Bloom RN  Pressure Reduction Techniques:   weight shift assistance provided   frequent weight shift encouraged   rest period provided between sit times   pressure points protected   heels elevated off bed  Head of Bed (HOB): HOB elevated  Pressure Reduction Devices:   alternating pressure pump (ADD)   positioning supports utilized  Skin Protection:   adhesive use limited   transparent dressing maintained   tubing/devices free from skin contact  Taken 6/13/2021 1222 by Stephania Bloom RN  Head of Bed (HOB): HOB elevated  Taken 6/13/2021 1012 by Stephania Bloom RN  Head of Bed (HOB): HOB elevated  Taken 6/13/2021 0806 by Stephania Bloom RN  Pressure Reduction Techniques:   frequent weight shift encouraged   sit time limited to 2 hours   pressure points protected   heels elevated off bed   rest period provided between sit times  Head of Bed (HOB): HOB elevated  Pressure Reduction Devices:   positioning supports utilized   alternating pressure pump (ADD)  Skin Protection:   adhesive use limited   transparent dressing maintained   tubing/devices free from skin contact   Goal Outcome Evaluation:  Plan of Care Reviewed With: patient, daughter   Progress: improving  Outcome Summary: VSS, no c/o pain, no signs of distress, remains RA. A&Ox4. BM x4. Up ad noam. Afebrile. Monitoring BG  and dosing accordingly. Monitoring WBC. Possible home tomorrow. Family updated at bedside.

## 2021-06-13 NOTE — PROGRESS NOTES
[facility] Progress Note      Patient Name: Junior Pollard Jr.  : 1955  MRN: [id number]  Primary Care Physician:  Boogie Interiano MD  Date of admission: 2021    Subjective     Interval History:     Patient feeling better.  Diarrhea improving slowly.  Denies any nausea or vomiting    Review of Systems:   14 point review of systems is otherwise negative except for mentioned above on HPI    Objective     Vitals:   Temp:  [98.8 °F (37.1 °C)-99.5 °F (37.5 °C)] 98.9 °F (37.2 °C)  Heart Rate:  [70-83] 70  Resp:  [16-18] 18  BP: (125-156)/(55-64) 125/55    Intake/Output Summary (Last 24 hours) at 2021 1046  Last data filed at 2021 2320  Gross per 24 hour   Intake 240 ml   Output --   Net 240 ml       Physical Exam:    General Appearance: alert, oriented x 3, no acute distress   Skin: warm and dry  HEENT: oral mucosa normal, nonicteric sclera  Neck: supple, no JVD  Lungs: CTA  Heart: RRR, normal S1 and S2  Abdomen: soft, nondistended, mild tenderness  Extremities: no edema, cyanosis or clubbing  Neuro: normal speech and mental status     Scheduled Meds:     aspirin, 81 mg, Oral, Daily  atorvastatin, 40 mg, Oral, Daily  ferrous sulfate, 325 mg, Oral, Daily With Breakfast  hydrocortisone-bacitracin-zinc oxide-nystatin, 1 application, Topical, BID  insulin lispro, 0-9 Units, Subcutaneous, 4x Daily With Meals & Nightly  vancomycin, 125 mg, Oral, Q6H      IV Meds:   Pharmacy Consult,         Results Reviewed:   I have personally reviewed the results from the time of this admission to 2021 10:46 EDT     Lab Results   Component Value Date    GLUCOSE 170 (H) 2021    CALCIUM 8.5 (L) 2021     2021    K 4.3 2021    CO2 21.2 (L) 2021     2021    BUN 21 2021    CREATININE 1.30 (H) 2021    EGFRIFAFRI 40 (L) 2021    EGFRIFNONA 55 (L) 2021    BCR 16.2 2021    ANIONGAP 8.8 2021      Lab Results    Component Value Date    CALCIUM 8.5 (L) 06/13/2021    PHOS 3.2 06/13/2021     Lab Results   Component Value Date    MG 1.8 06/12/2021      US Gallbladder    Result Date: 6/9/2021  PROCEDURE: US GALLBLADDER-  HISTORY: Biliary colic.  TECHNIQUE: Grayscale and color Doppler ultrasound of the gallbladder was performed.  COMPARISON: CT abdomen and pelvis without contrast 06/08/2021.  FINDINGS:  MEASUREMENTS: Liver:  17.6 cm Common bile duct diameter:  0.3 cm Right kidney: 10.9 x 4.6 x 5.1 cm  LIVER: The liver is mildly enlarged and normal in echogenicity. The echotexture is smooth. There is no intrahepatic mass or biliary ductal dilatation. There is hepatopetal flow in the main portal vein. There is small volume perihepatic fluid.  GALLBLADDER: There is cholelithiasis including a dominant 1.4 cm stone. The gallbladder is poorly distended. There is no gallbladder wall thickening. No sonographic Cornejo's sign was reported. There is no extrahepatic biliary ductal dilatation.  PANCREAS: The pancreas is poorly seen and cannot be evaluated.  RIGHT KIDNEY: There is no hydronephrosis. There are renal sinus cysts in the right kidney. No shadowing renal stone is visualized.       Impression:  Cholelithiasis. No biliary ductal dilatation. Mild hepatomegaly. Small volume perihepatic fluid.  This report was finalized on 6/9/2021 7:54 PM by Dr. May Quiroz M.D.         Assessment / Plan     ASSESSMENT:  1. CORY on CKD III, Likely due to prerenal state from volume depletion and hypotension in the setting of ongoing diarrhea.     Creatinine improved to baseline.  Electrolytes, volume status okay  2. C. Dif colitis, currently on p.o. Vanco, clinically improving  3. History of HTN  5. Anemia    PLAN:  1.  Continue to hold lisinopril  2.  Otherwise okay to NV from renal standpoint with outpatient follow-up.    Thank you for involving us in the care of Junior Pollard Jr..  Please feel free to call with any questions.    Andrés Swain  MD  06/13/21  10:46 EDT    [facility]  [phone]

## 2021-06-14 ENCOUNTER — READMISSION MANAGEMENT (OUTPATIENT)
Dept: CALL CENTER | Facility: HOSPITAL | Age: 66
End: 2021-06-14

## 2021-06-14 VITALS
RESPIRATION RATE: 16 BRPM | HEIGHT: 72 IN | WEIGHT: 250 LBS | DIASTOLIC BLOOD PRESSURE: 62 MMHG | BODY MASS INDEX: 33.86 KG/M2 | OXYGEN SATURATION: 98 % | HEART RATE: 67 BPM | TEMPERATURE: 97.7 F | SYSTOLIC BLOOD PRESSURE: 129 MMHG

## 2021-06-14 LAB
ALBUMIN SERPL-MCNC: 2.7 G/DL (ref 3.5–5.2)
ALBUMIN/GLOB SERPL: 0.9 G/DL
ALP SERPL-CCNC: 66 U/L (ref 39–117)
ALT SERPL W P-5'-P-CCNC: 41 U/L (ref 1–41)
ANION GAP SERPL CALCULATED.3IONS-SCNC: 7.8 MMOL/L (ref 5–15)
AST SERPL-CCNC: 29 U/L (ref 1–40)
BASOPHILS # BLD AUTO: 0.14 10*3/MM3 (ref 0–0.2)
BASOPHILS NFR BLD AUTO: 1 % (ref 0–1.5)
BILIRUB SERPL-MCNC: 0.3 MG/DL (ref 0–1.2)
BUN SERPL-MCNC: 19 MG/DL (ref 8–23)
BUN/CREAT SERPL: 12.9 (ref 7–25)
CALCIUM SPEC-SCNC: 8.5 MG/DL (ref 8.6–10.5)
CHLORIDE SERPL-SCNC: 105 MMOL/L (ref 98–107)
CO2 SERPL-SCNC: 23.2 MMOL/L (ref 22–29)
CREAT SERPL-MCNC: 1.47 MG/DL (ref 0.76–1.27)
DEPRECATED RDW RBC AUTO: 42 FL (ref 37–54)
EOSINOPHIL # BLD AUTO: 0.94 10*3/MM3 (ref 0–0.4)
EOSINOPHIL NFR BLD AUTO: 6.5 % (ref 0.3–6.2)
ERYTHROCYTE [DISTWIDTH] IN BLOOD BY AUTOMATED COUNT: 12 % (ref 12.3–15.4)
GFR SERPL CREATININE-BSD FRML MDRD: 48 ML/MIN/1.73
GLOBULIN UR ELPH-MCNC: 3 GM/DL
GLUCOSE BLDC GLUCOMTR-MCNC: 163 MG/DL (ref 70–130)
GLUCOSE BLDC GLUCOMTR-MCNC: 219 MG/DL (ref 70–130)
GLUCOSE SERPL-MCNC: 157 MG/DL (ref 65–99)
HCT VFR BLD AUTO: 31.7 % (ref 37.5–51)
HGB BLD-MCNC: 10.4 G/DL (ref 13–17.7)
IMM GRANULOCYTES # BLD AUTO: 0.16 10*3/MM3 (ref 0–0.05)
IMM GRANULOCYTES NFR BLD AUTO: 1.1 % (ref 0–0.5)
LYMPHOCYTES # BLD AUTO: 1.95 10*3/MM3 (ref 0.7–3.1)
LYMPHOCYTES NFR BLD AUTO: 13.4 % (ref 19.6–45.3)
MCH RBC QN AUTO: 30.9 PG (ref 26.6–33)
MCHC RBC AUTO-ENTMCNC: 32.8 G/DL (ref 31.5–35.7)
MCV RBC AUTO: 94.1 FL (ref 79–97)
MONOCYTES # BLD AUTO: 1.06 10*3/MM3 (ref 0.1–0.9)
MONOCYTES NFR BLD AUTO: 7.3 % (ref 5–12)
NEUTROPHILS NFR BLD AUTO: 10.27 10*3/MM3 (ref 1.7–7)
NEUTROPHILS NFR BLD AUTO: 70.7 % (ref 42.7–76)
NRBC BLD AUTO-RTO: 0 /100 WBC (ref 0–0.2)
PLATELET # BLD AUTO: 371 10*3/MM3 (ref 140–450)
PMV BLD AUTO: 10.2 FL (ref 6–12)
POTASSIUM SERPL-SCNC: 4.4 MMOL/L (ref 3.5–5.2)
PROT SERPL-MCNC: 5.7 G/DL (ref 6–8.5)
RBC # BLD AUTO: 3.37 10*6/MM3 (ref 4.14–5.8)
SODIUM SERPL-SCNC: 136 MMOL/L (ref 136–145)
WBC # BLD AUTO: 14.52 10*3/MM3 (ref 3.4–10.8)

## 2021-06-14 PROCEDURE — 63710000001 INSULIN LISPRO (HUMAN) PER 5 UNITS: Performed by: NURSE PRACTITIONER

## 2021-06-14 PROCEDURE — 99232 SBSQ HOSP IP/OBS MODERATE 35: CPT | Performed by: INTERNAL MEDICINE

## 2021-06-14 PROCEDURE — 85025 COMPLETE CBC W/AUTO DIFF WBC: CPT | Performed by: INTERNAL MEDICINE

## 2021-06-14 PROCEDURE — 82962 GLUCOSE BLOOD TEST: CPT

## 2021-06-14 PROCEDURE — G0378 HOSPITAL OBSERVATION PER HR: HCPCS

## 2021-06-14 PROCEDURE — 80053 COMPREHEN METABOLIC PANEL: CPT | Performed by: INTERNAL MEDICINE

## 2021-06-14 RX ORDER — FERROUS SULFATE 325(65) MG
325 TABLET ORAL
Qty: 30 TABLET | Refills: 2 | Status: SHIPPED | OUTPATIENT
Start: 2021-06-15 | End: 2021-06-28 | Stop reason: SDUPTHER

## 2021-06-14 RX ORDER — VANCOMYCIN HYDROCHLORIDE 125 MG/1
125 CAPSULE ORAL EVERY 6 HOURS SCHEDULED
Qty: 15 CAPSULE | Refills: 0 | Status: SHIPPED | OUTPATIENT
Start: 2021-06-14 | End: 2021-06-18

## 2021-06-14 RX ORDER — SACCHAROMYCES BOULARDII 250 MG
250 CAPSULE ORAL 2 TIMES DAILY
Qty: 60 CAPSULE | Refills: 2 | Status: SHIPPED | OUTPATIENT
Start: 2021-06-14 | End: 2021-06-23

## 2021-06-14 RX ADMIN — ZINC OXIDE 1 APPLICATION: 200 OINTMENT TOPICAL at 08:12

## 2021-06-14 RX ADMIN — VANCOMYCIN HYDROCHLORIDE 125 MG: 125 CAPSULE ORAL at 00:20

## 2021-06-14 RX ADMIN — INSULIN LISPRO 4 UNITS: 100 INJECTION, SOLUTION INTRAVENOUS; SUBCUTANEOUS at 11:59

## 2021-06-14 RX ADMIN — VANCOMYCIN HYDROCHLORIDE 125 MG: 125 CAPSULE ORAL at 05:58

## 2021-06-14 RX ADMIN — VANCOMYCIN HYDROCHLORIDE 125 MG: 125 CAPSULE ORAL at 11:59

## 2021-06-14 RX ADMIN — ASPIRIN 81 MG: 81 TABLET, FILM COATED ORAL at 08:12

## 2021-06-14 RX ADMIN — ATORVASTATIN CALCIUM 40 MG: 20 TABLET, FILM COATED ORAL at 08:12

## 2021-06-14 RX ADMIN — INSULIN LISPRO 2 UNITS: 100 INJECTION, SOLUTION INTRAVENOUS; SUBCUTANEOUS at 08:12

## 2021-06-14 RX ADMIN — FERROUS SULFATE TAB 325 MG (65 MG ELEMENTAL FE) 325 MG: 325 (65 FE) TAB at 08:12

## 2021-06-14 NOTE — DISCHARGE SUMMARY
[facility]    Date of Discharge:  6/14/2021    PCP: Boogie Interiano MD    Discharge Diagnosis:   Active Hospital Problems    Diagnosis  POA   • **Clostridium difficile colitis [A04.72]  Yes   • Sepsis without acute organ dysfunction (present on admit) [A41.9]  Yes   • Cholelithiases [K80.20]  Yes   • CORY (acute kidney injury) (CMS/[facility]) [N17.9]  Yes   • History of diverticulitis [Z87.19]  Not Applicable   • Uncontrolled type 2 diabetes mellitus with hyperglycemia (CMS/[facility]) [E11.65]  Yes   • Chronic coronary artery disease [I25.10]  Yes   • Type 2 diabetes with nephropathy (CMS/[facility]) [E11.21]  Yes   • Stage 3b chronic kidney disease (CMS/[facility]) [N18.32]  Yes   • Benign essential hypertension [I10]  Yes      Resolved Hospital Problems   No resolved problems to display.          Consults     Date and Time Order Name Status Description    6/12/2021  4:35 PM Inpatient Infectious Diseases Consult Completed     6/8/2021 10:35 AM Inpatient Nephrology Consult Completed     6/8/2021  5:36 AM [facility] (on-call MD unless specified) Details Completed         Hospital Course  65-year-old male came to the hospital, he was diagnosed to have C. difficile colitis.  Patient did have significant leukocytosis associated with clinical presentation.  Patient did have worsening of WBCs, we did consult infectious disease.  Upon evaluation ID recommended to complete total days of 10 days course of oral vancomycin.  Patient's WBCs have trended down today.  He was also advised to continue probiotics for 3 more months.  Patient also would continue oral iron therapy.  I have discussed treatment plan with patient and his daughter present bedside.  Patient is deemed stable to be discharged.  Total time spent on discharge management is more than 30 minutes.  Plan of care discussed with patient and nursing.      Condition on Discharge: Improved.     Temp:  [97.7 °F (36.5 °C)-99.1 °F (37.3 °C)] 97.7 °F  (36.5 °C)  Heart Rate:  [67-83] 67  Resp:  [16-18] 16  BP: (129-135)/(59-65) 129/62  Body mass index is 33.9 kg/m².    Physical Exam   General, awake and alert.  Head and ENT, normocephalic and atraumatic.  Lungs, symmetric expansion, equal air entry bilaterally.  Heart, regular rate and rhythm.  Abdomen, obese, mild tenderness.  Extremities, no clubbing or cyanosis.  Neuro, no focal deficits.  Skin: Warm and no rash.  Psych, normal mood and affect.  Musculoskeletal, joint examination is grossly normal.       Disposition: Home or Self Care       Discharge Medications      New Medications      Instructions Start Date   ferrous sulfate 325 (65 FE) MG tablet   325 mg, Oral, Daily With Breakfast   Start Date: Lala 15, 2021     saccharomyces boulardii 250 MG capsule  Commonly known as: Florastor   250 mg, Oral, 2 Times Daily      vancomycin 125 MG capsule  Commonly known as: VANCOCIN   125 mg, Oral, Every 6 Hours Scheduled         Continue These Medications      Instructions Start Date   Accu-Chek Jordyn Plus test strip  Generic drug: glucose blood   1 each, Other, Daily, Use as instructed      Accu-Chek Jordyn Plus w/Device kit   Use as directed to check BS daily E11.9      accu-chek soft touch lancets   Use as directed to test once daily E11.9      aspirin 81 MG EC tablet   81 mg, Oral, Daily      atorvastatin 40 MG tablet  Commonly known as: Lipitor   40 mg, Oral, Daily      ezetimibe 10 MG tablet  Commonly known as: Zetia   10 mg, Oral, Daily      glimepiride 2 MG tablet  Commonly known as: Amaryl   2 mg, Oral, 2 Times Daily - RT      HYDROcodone-acetaminophen 5-325 MG per tablet  Commonly known as: NORCO   1 tablet, Oral, Every 6 Hours PRN      Insulin Pen Needle 32G X 4 MM misc   Use QD      lisinopril 40 MG tablet  Commonly known as: PRINIVIL,ZESTRIL   40 mg, Oral, Daily      metaxalone 800 MG tablet  Commonly known as: SKELAXIN   800 mg, Oral, 3 Times Daily      pioglitazone 30 MG tablet  Commonly known as: ACTOS    30 mg, Oral, Daily      Victoza 18 MG/3ML solution pen-injector injection  Generic drug: Liraglutide   1.8 mg, Subcutaneous, Daily              Additional Instructions for the Follow-ups that You Need to Schedule     Discharge Follow-up with PCP   As directed       Currently Documented PCP:    Boogie Interiano MD    PCP Phone Number:    [phone]     Follow Up Details: Follow-up with PCP in 7 days.           Follow-up Information     Gerald Bravo MD. Go in 6 day(s).    Specialty: Nephrology  Why: Has follow up appt with Dr. Bravo June 16 at 3:40 pm.  Contact information:  [address]  [facility] [address]  [place] [id number] [phone]             Boogie Interiano MD .    Specialty: Family Medicine  Why: Follow-up with PCP in 7 days.  Contact information:  [address]  [facility] [id number] [phone]                     Jaden Chao MD  06/14/21  12:17 EDT    Discharge time spent greater than 30 minutes.

## 2021-06-14 NOTE — CASE MANAGEMENT/SOCIAL WORK
Continued Stay Note  [facility]     Patient Name: Junior Pollard Jr.  MRN: [id number]  Today's Date: 6/14/2021    Admit Date: 6/8/2021    Discharge Plan     Row Name 06/14/21 1711       Plan    Plan Comments  [facility] was advised that a PA is required for pt's vancomycin.  Call placed to [facility] and [facility] PA submitted, reference # [id number].  It will be a 24 hour or less turn around time.  WIN Alexander RN    Row Name 06/14/21 1605       Plan    Final Discharge Disposition Code  01 - home or self-care    Final Note  dc home        Discharge Codes    No documentation.       Expected Discharge Date and Time     Expected Discharge Date Expected Discharge Time    Jun 14, 2021             Ciera Alexander RN

## 2021-06-14 NOTE — OUTREACH NOTE
Prep Survey      Responses   [facility] facility patient discharged from?  [place]   Is LACE score < 7 ?  No   Emergency Room discharge w/ pulse ox?  No   Eligibility  [facility]   Date of Admission  06/08/21   Date of Discharge  06/14/21   Discharge Disposition  Home or Self Care   Discharge diagnosis  sepsis, C. diff colitis, CORY on CKD, uncontrolled T2DM   Does the patient have one of the following disease processes/diagnoses(primary or secondary)?  Sepsis   Does the patient have Home health ordered?  No   Is there a DME ordered?  No   Prep survey completed?  Yes          Shoshana Miller RN

## 2021-06-14 NOTE — PLAN OF CARE
Problem: Adult Inpatient Plan of Care  Goal: Plan of Care Review  Outcome: Ongoing, Progressing  Flowsheets (Taken 6/14/2021 1308)  Progress: improving  Plan of Care Reviewed With:   patient   daughter  Outcome Summary: VSS, no c/o pain, no signs of distress, A&Ox4. Up ad noam. Remains on RA. WBC trending down. Going home on antibiotics. Family at bedside  Goal: Patient-Specific Goal (Individualized)  Outcome: Ongoing, Progressing  Goal: Absence of Hospital-Acquired Illness or Injury  Outcome: Ongoing, Progressing  Intervention: Identify and Manage Fall Risk  Recent Flowsheet Documentation  Taken 6/14/2021 1020 by Stephania Bloom RN  Safety Promotion/Fall Prevention:   activity supervised   assistive device/personal items within reach   clutter free environment maintained   fall prevention program maintained   nonskid shoes/slippers when out of bed   room organization consistent   safety round/check completed   lighting adjusted  Taken 6/14/2021 0811 by Stephania Bloom RN  Safety Promotion/Fall Prevention:   activity supervised   assistive device/personal items within reach   clutter free environment maintained   fall prevention program maintained   room organization consistent   safety round/check completed   nonskid shoes/slippers when out of bed  Intervention: Prevent Skin Injury  Recent Flowsheet Documentation  Taken 6/14/2021 1020 by Stephania Bloom RN  Body Position: position changed independently  Taken 6/14/2021 0811 by Stephania Bloom RN  Body Position: position changed independently  Skin Protection:   adhesive use limited   tubing/devices free from skin contact   transparent dressing maintained  Intervention: Prevent and Manage VTE (venous thromboembolism) Risk  Recent Flowsheet Documentation  Taken 6/14/2021 0811 by Stephania Bloom RN  VTE Prevention/Management:   bilateral   dorsiflexion/plantar flexion performed  Intervention: Prevent Infection  Recent Flowsheet  Documentation  Taken 6/14/2021 1020 by Stephania Bloom RN  Infection Prevention:   visitors restricted/screened   single patient room provided   rest/sleep promoted   personal protective equipment utilized  Taken 6/14/2021 0811 by Stephania Bloom RN  Infection Prevention:   visitors restricted/screened   single patient room provided   rest/sleep promoted   personal protective equipment utilized  Goal: Optimal Comfort and Wellbeing  Outcome: Ongoing, Progressing  Intervention: Provide Person-Centered Care  Recent Flowsheet Documentation  Taken 6/14/2021 0811 by Stephania Bloom RN  Trust Relationship/Rapport: care explained  Goal: Readiness for Transition of Care  Outcome: Ongoing, Progressing     Problem: Skin Injury Risk Increased  Goal: Skin Health and Integrity  Outcome: Ongoing, Progressing  Intervention: Optimize Skin Protection  Recent Flowsheet Documentation  Taken 6/14/2021 1020 by Stephania Bloom RN  Head of Bed (HOB): HOB elevated  Taken 6/14/2021 0811 by Stephania Bloom RN  Pressure Reduction Techniques:   frequent weight shift encouraged   sit time limited to 2 hours   pressure points protected   heels elevated off bed  Head of Bed (HOB): HOB elevated  Pressure Reduction Devices:   positioning supports utilized   alternating pressure pump (ADD)  Skin Protection:   adhesive use limited   tubing/devices free from skin contact   transparent dressing maintained   Goal Outcome Evaluation:  Plan of Care Reviewed With: patient, daughter        Progress: improving  Outcome Summary: VSS, no c/o pain, no signs of distress, A&Ox4. Up ad noam. Remains on RA. WBC trending down. Going home on antibiotics. Family at bedside

## 2021-06-14 NOTE — PROGRESS NOTES
LOS: 3 days     Chief Complaint:  Follow-up C diff    Interval History:  Stool is now nearly formed. Improved abdominal pain. No fever. Ready for discharge today.    ROS: no n/v    Vital Signs  Temp:  [97.8 °F (36.6 °C)-99.1 °F (37.3 °C)] 97.8 °F (36.6 °C)  Heart Rate:  [75-83] 75  Resp:  [16-18] 16  BP: (130-135)/(59-65) 135/65    Physical Exam:  General: awake, alert, NAD   Eyes: no scleral icterus  Cardiovascular: NR  Respiratory: normal work of breathing on ambient air  GI: Abdomen is mildly distended and tender  :  no Palacio catheter  Musculoskeletal:normal musculature  Skin: No rashes  Neurological: Alert and oriented x 3  Psychiatric: Normal mood and affect   Vasc: PIV w/o erythema    Antibiotics:  •  vancomycin (VANCOCIN) capsule 125 mg, 125 mg, Oral, Q6H    LABS:  CBC, BMP  reviewed today  Lab Results   Component Value Date    WBC 17.99 (H) 06/13/2021    HGB 10.5 (L) 06/13/2021    HCT 32.1 (L) 06/13/2021    MCV 94.4 06/13/2021     06/13/2021     Lab Results   Component Value Date    GLUCOSE 170 (H) 06/13/2021    BUN 21 06/13/2021    CREATININE 1.30 (H) 06/13/2021    EGFRIFNONA 55 (L) 06/13/2021    EGFRIFAFRI 40 (L) 01/06/2021    BCR 16.2 06/13/2021    CO2 21.2 (L) 06/13/2021    CALCIUM 8.5 (L) 06/13/2021    PROTENTOTREF 7.9 04/08/2019    ALBUMIN 2.70 (L) 06/13/2021    LABIL2 1.5 04/08/2019    AST 17 06/10/2021    ALT 27 06/10/2021     Microbiology:  6/8 C diff; positive  6/8 GI PCR: negative  6/8 COVID: negative    Radiology (personally reviewed report):   No new imaging    Assessment/Plan   1. C difficile colitis - symptoms markedly improved  2. CORY on CKD 3 - improved  3. Anemia of chronic disease  4. Leukocytosis - better     No fever, BM frequency improved, BM consistency more formed, and WBC trending down (albeit slightly). He has felt well enough to leave the hospital for the past few days and I think he is ready for discharge today. Continue vancomycin 125 mg PO q6h x 10 day course. I  recommended that he take a probiotic for about 3 months once he finishes his oral vancomycin course.      D/W RN re: plan.    Thank you for allowing me to be involved in the care of this patient. Infectious diseases will sign off at this time with antibiotics plan in place, but please call me at [phone] if any further ID questions or new ID concerns.

## 2021-06-14 NOTE — CASE MANAGEMENT/SOCIAL WORK
Continued Stay Note  [facility]     Patient Name: Junior Pollard Jr.  MRN: [id number]  Today's Date: 6/14/2021    Admit Date: 6/8/2021    Discharge Plan     Row Name 06/14/21 9127       Plan    Plan Comments  Spoke with [facility] staff.  The PA has not yet been approved.  They will rerun the claim again when pt's wife comes to pick it up.  If she has to pay the $50 cost and the PA comes in after she does that, [facility] will refund her money.  Attempted call to pt's wife with no answer.  Left  for call back.  Per [facility], they have alerted her to all.  WIN Alexander RN    Row Name 06/14/21 2541       Plan    Plan Comments  CCP was advised that a PA is required for pt's vancomycin.  Call placed to [facility] and [facility] PA submitted, reference # [id number].  It will be a 24 hour or less turn around time.  WIN Alexander RN    Row Name 06/14/21 8875       Plan    Final Discharge Disposition Code  01 - home or self-care    Final Note  dc home        Discharge Codes    No documentation.       Expected Discharge Date and Time     Expected Discharge Date Expected Discharge Time    Jun 14, 2021             Ciera Alexander RN

## 2021-06-15 ENCOUNTER — TRANSITIONAL CARE MANAGEMENT TELEPHONE ENCOUNTER (OUTPATIENT)
Dept: CALL CENTER | Facility: HOSPITAL | Age: 66
End: 2021-06-15

## 2021-06-15 NOTE — OUTREACH NOTE
Call Center TCM Note      Responses   [facility] patient discharged from?  [place]   Does the patient have one of the following disease processes/diagnoses(primary or secondary)?  Sepsis   TCM attempt successful?  No   Unsuccessful attempts  Attempt 1          Bina Rivera MA    6/15/2021, 13:54 EDT

## 2021-06-15 NOTE — OUTREACH NOTE
Call Center TCM Note      Responses   [facility] patient discharged from?  [place]   Does the patient have one of the following disease processes/diagnoses(primary or secondary)?  Sepsis   TCM attempt successful?  No   Unsuccessful attempts  Attempt 2          Bina Rivera MA    6/15/2021, 17:03 EDT

## 2021-06-16 ENCOUNTER — TRANSITIONAL CARE MANAGEMENT TELEPHONE ENCOUNTER (OUTPATIENT)
Dept: CALL CENTER | Facility: HOSPITAL | Age: 66
End: 2021-06-16

## 2021-06-16 NOTE — OUTREACH NOTE
Call Center TCM Note      Responses   [facility] patient discharged from?  [place]   Does the patient have one of the following disease processes/diagnoses(primary or secondary)?  Sepsis   TCM attempt successful?  No   Unsuccessful attempts  Attempt 3   Wrap up additional comments  Unable to reach pt x 3 attempts for TCM call. Pt is sched for TCM FWP with PCP Dr Boogie Interiano on 06/23/2021.          Bina Rivera MA    6/16/2021, 17:00 EDT

## 2021-06-22 ENCOUNTER — READMISSION MANAGEMENT (OUTPATIENT)
Dept: CALL CENTER | Facility: HOSPITAL | Age: 66
End: 2021-06-22

## 2021-06-22 RX ORDER — LISINOPRIL 40 MG/1
40 TABLET ORAL DAILY
Qty: 90 TABLET | Refills: 1 | Status: CANCELLED | OUTPATIENT
Start: 2021-06-22

## 2021-06-22 NOTE — OUTREACH NOTE
Sepsis Week 2 Survey      Responses   [facility] patient discharged from?  [place]   Does the patient have one of the following disease processes/diagnoses(primary or secondary)?  Sepsis   Week 2 attempt successful?  Yes   Call start time  1646   Call end time  1653   Discharge diagnosis  sepsis, C. diff colitis, CORY on CKD, uncontrolled T2DM   Is patient permission given to speak with other caregiver?  Yes   List who call center can speak with  daughterMiesha   Person spoke with today (if not patient) and relationship  daughterMiesha   Meds reviewed with patient/caregiver?  Yes   Is the patient having any side effects they believe may be caused by any medication additions or changes?  No   Does the patient have all medications related to this admission filled (includes all antibiotics, inhalers, nebulizers,steroids,etc.)  Yes   Is the patient taking all medications as directed (includes completed medication regime)?  Yes   Does the patient have a primary care provider?   Yes   Comments regarding PCP  PCP Dr Interiano. F/U appt tomorrow 6/23/21   Does the patient have an appointment with their PCP within 7 days of discharge?  Greater than 7 days   Nursing Interventions  Verified appointment date/time/provider   Has the patient kept scheduled appointments due by today?  Yes   Has home health visited the patient within 72 hours of discharge?  N/A   Psychosocial issues?  No   Did the patient receive a copy of their discharge instructions?  Yes   Nursing interventions  Reviewed instructions with patient [daughter]   What is the patient's perception of their health status since discharge?  Improving   Is the patient/caregiver able to teach back Sepsis?  S - Shivering,fever or very cold, S - Short of breath   Nursing interventions  Nurse provided reassurance to patient, Nurse provided patient education   Is patient/caregiver able to teach back steps to recovery at home?  Set small, achievable goals  for return to baseline health, Rest and regain strength   Is the patient/caregiver able to teach back signs and symptoms of worsening condition:  Fever, Rapid heart rate (>90), Shortness of breath/rapid respiratory rate, High blood glucose without diabetes   If the patient is a current smoker, are they able to teach back resources for cessation?  Not a smoker   Is the patient/caregiver able to teach back the hierarchy of who to call/visit for symptoms/problems? PCP, Specialist, Home health nurse, Urgent Care, ED, 911  Yes   Week 2 call completed?  Yes   Wrap up additional comments  Daughter reports patient doing well. Reports patient eating well and normal bowel movements. Denies questions or needs.           Bina Whitlock RN

## 2021-06-22 NOTE — PROGRESS NOTES
Transitional Care Follow Up Visit  Subjective     Junior Pollard Jr. is a 65 y.o. male who presents for a transitional care management visit.    Within 48 business hours after discharge our office contacted him via telephone to coordinate his care and needs.      I reviewed and discussed the details of that call along with the discharge summary, hospital problems, inpatient lab results, inpatient diagnostic studies, and consultation reports with Junior.     Current outpatient and discharge medications have been reconciled for the patient.  Reviewed by: Boogie Interiano MD      Date of TCM Phone Call 6/14/2021   [facility]   Date of Admission 6/8/2021   Date of Discharge 6/14/2021   Discharge Disposition Home or Self Care     Risk for Readmission (LACE) No data recorded      History of Present Illness   Course During Hospital Stay:      Date of Discharge:  6/14/2021     PCP: Boogie Interiano MD     Discharge Diagnosis:         Active Hospital Problems     Diagnosis   POA   • **Clostridium difficile colitis [A04.72]   Yes   • Sepsis without acute organ dysfunction (present on admit) [A41.9]   Yes   • Cholelithiases [K80.20]   Yes   • CORY (acute kidney injury) (CMS/HCC) [N17.9]   Yes   • History of diverticulitis [Z87.19]   Not Applicable   • Uncontrolled type 2 diabetes mellitus with hyperglycemia (CMS/HCC) [E11.65]   Yes   • Chronic coronary artery disease [I25.10]   Yes   • Type 2 diabetes with nephropathy (CMS/HCC) [E11.21]   Yes   • Stage 3b chronic kidney disease (CMS/HCC) [N18.32]   Yes   • Benign essential hypertension [I10]   Yes       Resolved Hospital Problems   No resolved problems to display.                 Consults      Date and Time Order Name Status Description     6/12/2021  4:35 PM Inpatient Infectious Diseases Consult Completed       6/8/2021 10:35 AM Inpatient Nephrology Consult Completed       6/8/2021  5:36 AM [facility] (on-call MD unless specified) Details Completed            Hospital  "Course  65-year-old male came to the hospital, he was diagnosed to have C. difficile colitis.  Patient did have significant leukocytosis associated with clinical presentation.  Patient did have worsening of WBCs, we did consult infectious disease.  Upon evaluation ID recommended to complete total days of 10 days course of oral vancomycin.  Patient's WBCs have trended down today.  He was also advised to continue probiotics for 3 more months.  Patient also would continue oral iron therapy.  I have discussed treatment plan with patient and his daughter present bedside.  Patient is deemed stable to be discharged.  Total time spent on discharge management is more than 30 minutes.  Plan of care discussed with patient and nursing.    Current outpatient and discharge medications have been reconciled for the patient.  Reviewed by: Boogie Interiano MD         The following portions of the patient's history were reviewed and updated as appropriate: allergies, current medications, past family history, past medical history, past social history, past surgical history and problem list.    Review of Systems   Constitutional: Negative for activity change, chills and fever.   Respiratory: Negative for cough.    Cardiovascular: Negative for chest pain.   Psychiatric/Behavioral: Negative for dysphoric mood.       /62   Pulse 74   Temp 97.6 °F (36.4 °C) (Oral)   Resp 16   Ht 182.9 cm (72.01\")   Wt 115 kg (254 lb)   BMI 34.44 kg/m²     Objective   Physical Exam  Constitutional:       General: He is not in acute distress.     Appearance: He is well-developed.   Cardiovascular:      Rate and Rhythm: Normal rate and regular rhythm.      Pulses:           Dorsalis pedis pulses are 2+ on the right side and 2+ on the left side.        Posterior tibial pulses are 2+ on the right side and 2+ on the left side.   Pulmonary:      Effort: Pulmonary effort is normal.      Breath sounds: Normal breath sounds.   Musculoskeletal:      Right " foot: No bunion.      Left foot: No bunion.   Feet:      Right foot:      Protective Sensation: 10 sites tested. 10 sites sensed.      Toenail Condition: Fungal disease present.     Left foot:      Protective Sensation: 10 sites tested. 10 sites sensed.      Toenail Condition: Fungal disease present.  Neurological:      Mental Status: He is alert and oriented to person, place, and time.   Psychiatric:         Behavior: Behavior normal.         Thought Content: Thought content normal.     Hospital records reviewed with pt confirming HPI.      Assessment/Plan   Diagnoses and all orders for this visit:    1. C. difficile colitis (Primary)  -     Ambulatory Referral to Gastroenterology    2. Hospital discharge follow-up    3. Type 2 diabetes with nephropathy (CMS/HCC)  -     glimepiride (Amaryl) 2 MG tablet; Take 1 tablet by mouth 2 (Two) Times a Day.  Dispense: 180 tablet; Refill: 1  -     pioglitazone (ACTOS) 30 MG tablet; Take 1 tablet by mouth Daily.  Dispense: 90 tablet; Refill: 1  -     Discontinue: Liraglutide (Victoza) 18 MG/3ML solution pen-injector injection; Inject 1.8 mg under the skin into the appropriate area as directed Daily.  Dispense: 27 mL; Refill: 1    4. Hypercholesterolemia  Comments:  uncontrolled  Orders:  -     ezetimibe (Zetia) 10 MG tablet; Take 1 tablet by mouth Daily.  Dispense: 90 tablet; Refill: 1  -     atorvastatin (Lipitor) 40 MG tablet; Take 1 tablet by mouth Daily.  Dispense: 90 tablet; Refill: 1    5. Benign essential hypertension    6. Screening for colon cancer  -     Ambulatory Referral to Gastroenterology    Review of chart shows h/o anemia back to 2018, when pt was referred to GI for w/u, which he never completed.

## 2021-06-23 ENCOUNTER — OFFICE VISIT (OUTPATIENT)
Dept: FAMILY MEDICINE CLINIC | Facility: CLINIC | Age: 66
End: 2021-06-23

## 2021-06-23 VITALS
BODY MASS INDEX: 34.4 KG/M2 | HEART RATE: 74 BPM | DIASTOLIC BLOOD PRESSURE: 62 MMHG | WEIGHT: 254 LBS | HEIGHT: 72 IN | RESPIRATION RATE: 16 BRPM | TEMPERATURE: 97.6 F | SYSTOLIC BLOOD PRESSURE: 124 MMHG

## 2021-06-23 DIAGNOSIS — A04.72 C. DIFFICILE COLITIS: Primary | ICD-10-CM

## 2021-06-23 DIAGNOSIS — E11.21 TYPE 2 DIABETES WITH NEPHROPATHY (HCC): Chronic | ICD-10-CM

## 2021-06-23 DIAGNOSIS — Z12.11 SCREENING FOR COLON CANCER: ICD-10-CM

## 2021-06-23 DIAGNOSIS — I10 BENIGN ESSENTIAL HYPERTENSION: Chronic | ICD-10-CM

## 2021-06-23 DIAGNOSIS — Z09 HOSPITAL DISCHARGE FOLLOW-UP: ICD-10-CM

## 2021-06-23 DIAGNOSIS — E78.00 HYPERCHOLESTEROLEMIA: Chronic | ICD-10-CM

## 2021-06-23 PROCEDURE — 99495 TRANSJ CARE MGMT MOD F2F 14D: CPT | Performed by: FAMILY MEDICINE

## 2021-06-23 PROCEDURE — 1111F DSCHRG MED/CURRENT MED MERGE: CPT | Performed by: FAMILY MEDICINE

## 2021-06-23 RX ORDER — LIRAGLUTIDE 6 MG/ML
1.8 INJECTION SUBCUTANEOUS DAILY
Qty: 27 ML | Refills: 1 | Status: SHIPPED | OUTPATIENT
Start: 2021-06-23 | End: 2021-07-13

## 2021-06-23 RX ORDER — ATORVASTATIN CALCIUM 40 MG/1
40 TABLET, FILM COATED ORAL DAILY
Qty: 90 TABLET | Refills: 1 | Status: SHIPPED | OUTPATIENT
Start: 2021-06-23 | End: 2021-12-15 | Stop reason: SDUPTHER

## 2021-06-23 RX ORDER — PIOGLITAZONEHYDROCHLORIDE 30 MG/1
30 TABLET ORAL DAILY
Qty: 90 TABLET | Refills: 1 | Status: SHIPPED | OUTPATIENT
Start: 2021-06-23 | End: 2021-12-15 | Stop reason: SDUPTHER

## 2021-06-23 RX ORDER — EZETIMIBE 10 MG/1
10 TABLET ORAL DAILY
Qty: 90 TABLET | Refills: 1 | Status: SHIPPED | OUTPATIENT
Start: 2021-06-23 | End: 2021-12-15 | Stop reason: SDUPTHER

## 2021-06-23 RX ORDER — LISINOPRIL 5 MG/1
TABLET ORAL
COMMUNITY
Start: 2021-06-17 | End: 2021-06-28 | Stop reason: SDUPTHER

## 2021-06-23 RX ORDER — GLIMEPIRIDE 2 MG/1
2 TABLET ORAL
Qty: 180 TABLET | Refills: 1 | Status: SHIPPED | OUTPATIENT
Start: 2021-06-23 | End: 2021-12-15 | Stop reason: SDUPTHER

## 2021-06-28 DIAGNOSIS — E11.21 TYPE 2 DIABETES WITH NEPHROPATHY (HCC): Chronic | ICD-10-CM

## 2021-06-29 ENCOUNTER — TELEPHONE (OUTPATIENT)
Dept: GASTROENTEROLOGY | Facility: CLINIC | Age: 66
End: 2021-06-29

## 2021-06-29 RX ORDER — LISINOPRIL 5 MG/1
5 TABLET ORAL DAILY
Qty: 90 TABLET | Refills: 1 | Status: SHIPPED | OUTPATIENT
Start: 2021-06-29 | End: 2021-12-15 | Stop reason: SDUPTHER

## 2021-06-29 RX ORDER — BLOOD SUGAR DIAGNOSTIC
1 STRIP MISCELLANEOUS DAILY
Qty: 100 EACH | Refills: 3 | Status: SHIPPED | OUTPATIENT
Start: 2021-06-29 | End: 2021-12-16 | Stop reason: SDUPTHER

## 2021-06-29 RX ORDER — FERROUS SULFATE 325(65) MG
325 TABLET ORAL
Qty: 90 TABLET | Refills: 1 | Status: SHIPPED | OUTPATIENT
Start: 2021-06-29 | End: 2021-09-27

## 2021-06-30 ENCOUNTER — READMISSION MANAGEMENT (OUTPATIENT)
Dept: CALL CENTER | Facility: HOSPITAL | Age: 66
End: 2021-06-30

## 2021-06-30 NOTE — OUTREACH NOTE
Sepsis Week 3 Survey      Responses   [facility] patient discharged from?  [place]   Does the patient have one of the following disease processes/diagnoses(primary or secondary)?  Sepsis   Week 3 attempt successful?  No   Unsuccessful attempts  Attempt 1          Rehana Ornelas RN

## 2021-07-02 ENCOUNTER — READMISSION MANAGEMENT (OUTPATIENT)
Dept: CALL CENTER | Facility: HOSPITAL | Age: 66
End: 2021-07-02

## 2021-07-02 NOTE — OUTREACH NOTE
Sepsis Week 3 Survey      Responses   [facility] patient discharged from?  [place]   Does the patient have one of the following disease processes/diagnoses(primary or secondary)?  Sepsis   Week 3 attempt successful?  No   Unsuccessful attempts  Attempt 2          Yoli Gleason RN

## 2021-07-12 DIAGNOSIS — E11.21 TYPE 2 DIABETES WITH NEPHROPATHY (HCC): Chronic | ICD-10-CM

## 2021-07-13 RX ORDER — LIRAGLUTIDE 6 MG/ML
INJECTION SUBCUTANEOUS
Qty: 27 PEN | Refills: 1 | Status: SHIPPED | OUTPATIENT
Start: 2021-07-13 | End: 2021-12-15 | Stop reason: SDUPTHER

## 2021-09-01 DIAGNOSIS — E11.21 TYPE 2 DIABETES WITH NEPHROPATHY (HCC): Chronic | ICD-10-CM

## 2021-09-01 DIAGNOSIS — E78.00 HYPERCHOLESTEROLEMIA: Chronic | ICD-10-CM

## 2021-09-01 RX ORDER — FERROUS SULFATE 325(65) MG
325 TABLET ORAL
Qty: 90 TABLET | Refills: 1 | OUTPATIENT
Start: 2021-09-01 | End: 2021-11-30

## 2021-09-01 RX ORDER — EZETIMIBE 10 MG/1
10 TABLET ORAL DAILY
Qty: 90 TABLET | Refills: 1 | OUTPATIENT
Start: 2021-09-01

## 2021-09-01 RX ORDER — BLOOD-GLUCOSE METER
EACH MISCELLANEOUS
Qty: 1 KIT | Refills: 0 | OUTPATIENT
Start: 2021-09-01

## 2021-09-01 RX ORDER — LIRAGLUTIDE 6 MG/ML
INJECTION SUBCUTANEOUS
Qty: 27 PEN | Refills: 1 | OUTPATIENT
Start: 2021-09-01

## 2021-09-01 RX ORDER — GLIMEPIRIDE 2 MG/1
2 TABLET ORAL
Qty: 180 TABLET | Refills: 1 | OUTPATIENT
Start: 2021-09-01

## 2021-09-01 RX ORDER — ATORVASTATIN CALCIUM 40 MG/1
40 TABLET, FILM COATED ORAL DAILY
Qty: 90 TABLET | Refills: 1 | OUTPATIENT
Start: 2021-09-01

## 2021-09-01 RX ORDER — PIOGLITAZONEHYDROCHLORIDE 30 MG/1
30 TABLET ORAL DAILY
Qty: 90 TABLET | Refills: 1 | OUTPATIENT
Start: 2021-09-01

## 2021-09-01 RX ORDER — LISINOPRIL 5 MG/1
5 TABLET ORAL DAILY
Qty: 90 TABLET | Refills: 1 | OUTPATIENT
Start: 2021-09-01

## 2021-09-01 NOTE — TELEPHONE ENCOUNTER
Caller: Junior Pollard Jr.    Relationship: Self    Best call back number: [phone]     Medication needed:   Requested Prescriptions     Pending Prescriptions Disp Refills   • Liraglutide (Victoza) 18 MG/3ML solution pen-injector injection 27 pen 1   • Blood Glucose Monitoring Suppl (Accu-Chek Jordyn Plus) w/Device kit 1 kit 0     Sig: Use as directed to check BS daily E11.9   • pioglitazone (ACTOS) 30 MG tablet 90 tablet 1     Sig: Take 1 tablet by mouth Daily.   • atorvastatin (Lipitor) 40 MG tablet 90 tablet 1     Sig: Take 1 tablet by mouth Daily.   • ferrous sulfate 325 (65 FE) MG tablet 90 tablet 1     Sig: Take 1 tablet by mouth Daily With Breakfast for 90 days.   • lisinopril (PRINIVIL,ZESTRIL) 5 MG tablet 90 tablet 1     Sig: Take 1 tablet by mouth Daily.   • ezetimibe (Zetia) 10 MG tablet 90 tablet 1     Sig: Take 1 tablet by mouth Daily.   • glimepiride (Amaryl) 2 MG tablet 180 tablet 1     Sig: Take 1 tablet by mouth 2 (Two) Times a Day.       When do you need the refill by: BEGINNING OF January 2022    What additional details did the patient provide when requesting the medication: PATIENT WILL BE GETTING THE NEXT REFILLS FOR THESE MEDICATIONS IN THE FIRST OF October, BUT THEN AFTER THAT THE NEXT REFILL WILL BE THE FIRST WEEK OF January, BUT PATIENT WAS NOT ABLE TO GET AN APPOINTMENT UNTIL 1/12/2021 TO GET THESE REFILLED. HE WILL BE RUNNING OUT OF THE MEDICATION BEFORE THE APPOINTMENT. PATIENT WANTED TO SEE IF HE CAN GET A 90 DAY SUPPLY OF THESE MEDICATIONS WHEN HE REFILLS THEM IN January.    Does the patient have less than a 3 day supply:  [] Yes  [x] No    What is the patient's preferred pharmacy:  [facility] [facility] AT [facility] [phone] [facility] [phone] FX

## 2021-12-15 DIAGNOSIS — E78.00 HYPERCHOLESTEROLEMIA: Chronic | ICD-10-CM

## 2021-12-15 DIAGNOSIS — E11.21 TYPE 2 DIABETES WITH NEPHROPATHY (HCC): Chronic | ICD-10-CM

## 2021-12-15 RX ORDER — EZETIMIBE 10 MG/1
10 TABLET ORAL DAILY
Qty: 90 TABLET | Refills: 0 | Status: SHIPPED | OUTPATIENT
Start: 2021-12-15 | End: 2022-01-11 | Stop reason: SDUPTHER

## 2021-12-15 RX ORDER — GLIMEPIRIDE 2 MG/1
2 TABLET ORAL
Qty: 180 TABLET | Refills: 0 | Status: SHIPPED | OUTPATIENT
Start: 2021-12-15 | End: 2022-01-11 | Stop reason: SDUPTHER

## 2021-12-15 RX ORDER — BLOOD-GLUCOSE METER
EACH MISCELLANEOUS
Qty: 1 KIT | Refills: 0 | Status: SHIPPED | OUTPATIENT
Start: 2021-12-15

## 2021-12-15 RX ORDER — PIOGLITAZONEHYDROCHLORIDE 30 MG/1
30 TABLET ORAL DAILY
Qty: 90 TABLET | Refills: 0 | Status: SHIPPED | OUTPATIENT
Start: 2021-12-15 | End: 2022-01-11 | Stop reason: SDUPTHER

## 2021-12-15 RX ORDER — LISINOPRIL 5 MG/1
5 TABLET ORAL DAILY
Qty: 90 TABLET | Refills: 0 | Status: SHIPPED | OUTPATIENT
Start: 2021-12-15 | End: 2022-01-11 | Stop reason: SDUPTHER

## 2021-12-15 RX ORDER — ATORVASTATIN CALCIUM 40 MG/1
40 TABLET, FILM COATED ORAL DAILY
Qty: 90 TABLET | Refills: 0 | Status: SHIPPED | OUTPATIENT
Start: 2021-12-15 | End: 2022-01-11 | Stop reason: SDUPTHER

## 2021-12-15 RX ORDER — LIRAGLUTIDE 6 MG/ML
1.8 INJECTION SUBCUTANEOUS DAILY
Qty: 27 PEN | Refills: 0 | Status: SHIPPED | OUTPATIENT
Start: 2021-12-15 | End: 2022-01-11 | Stop reason: SDUPTHER

## 2021-12-15 NOTE — TELEPHONE ENCOUNTER
PATIENT REQUESTING 90 DAY SUPPLY - PATIENTS APPOINTMENT RESCHEDULED DUE TO PHYS OUT OF THE OFFICE TO 01/2022 . PT NEEDS REFILLS ON ALL MEDICATIONS. . PLEASE REVIEW DR MCCAIN  - 90 DAY SUPPLY

## 2021-12-16 DIAGNOSIS — E11.21 TYPE 2 DIABETES WITH NEPHROPATHY (HCC): Chronic | ICD-10-CM

## 2021-12-16 RX ORDER — FERROUS SULFATE 325(65) MG
TABLET ORAL
COMMUNITY
Start: 2021-10-04 | End: 2022-01-11 | Stop reason: SDUPTHER

## 2021-12-17 RX ORDER — FERROUS SULFATE 325(65) MG
325 TABLET ORAL
Qty: 90 TABLET | Refills: 0 | Status: SHIPPED | OUTPATIENT
Start: 2021-12-17 | End: 2022-01-11 | Stop reason: SDUPTHER

## 2021-12-17 RX ORDER — BLOOD SUGAR DIAGNOSTIC
1 STRIP MISCELLANEOUS DAILY
Qty: 100 EACH | Refills: 3 | Status: SHIPPED | OUTPATIENT
Start: 2021-12-17 | End: 2022-01-12 | Stop reason: SDUPTHER

## 2021-12-21 ENCOUNTER — PRIOR AUTHORIZATION (OUTPATIENT)
Dept: FAMILY MEDICINE CLINIC | Facility: CLINIC | Age: 66
End: 2021-12-21

## 2022-01-12 ENCOUNTER — OFFICE VISIT (OUTPATIENT)
Dept: FAMILY MEDICINE CLINIC | Facility: CLINIC | Age: 67
End: 2022-01-12

## 2022-01-12 VITALS
SYSTOLIC BLOOD PRESSURE: 124 MMHG | TEMPERATURE: 96.8 F | DIASTOLIC BLOOD PRESSURE: 62 MMHG | RESPIRATION RATE: 16 BRPM | HEIGHT: 72 IN | WEIGHT: 266 LBS | HEART RATE: 82 BPM | BODY MASS INDEX: 36.03 KG/M2

## 2022-01-12 DIAGNOSIS — E11.21 TYPE 2 DIABETES WITH NEPHROPATHY: Primary | Chronic | ICD-10-CM

## 2022-01-12 DIAGNOSIS — I10 BENIGN ESSENTIAL HYPERTENSION: ICD-10-CM

## 2022-01-12 DIAGNOSIS — E78.00 HYPERCHOLESTEROLEMIA: Chronic | ICD-10-CM

## 2022-01-12 DIAGNOSIS — D64.9 ANEMIA, UNSPECIFIED TYPE: ICD-10-CM

## 2022-01-12 DIAGNOSIS — M17.0 PRIMARY OSTEOARTHRITIS OF BOTH KNEES: ICD-10-CM

## 2022-01-12 PROCEDURE — 99214 OFFICE O/P EST MOD 30 MIN: CPT | Performed by: FAMILY MEDICINE

## 2022-01-12 RX ORDER — LISINOPRIL 5 MG/1
5 TABLET ORAL DAILY
Qty: 90 TABLET | Refills: 1 | Status: SHIPPED | OUTPATIENT
Start: 2022-01-12 | End: 2022-07-11 | Stop reason: SDUPTHER

## 2022-01-12 RX ORDER — BLOOD SUGAR DIAGNOSTIC
1 STRIP MISCELLANEOUS DAILY
Qty: 100 EACH | Refills: 3 | Status: SHIPPED | OUTPATIENT
Start: 2022-01-12 | End: 2023-01-30

## 2022-01-12 RX ORDER — FERROUS SULFATE 325(65) MG
325 TABLET ORAL
Qty: 90 TABLET | Refills: 1 | Status: SHIPPED | OUTPATIENT
Start: 2022-01-12 | End: 2022-07-12

## 2022-01-12 RX ORDER — EZETIMIBE 10 MG/1
10 TABLET ORAL DAILY
Qty: 90 TABLET | Refills: 1 | Status: SHIPPED | OUTPATIENT
Start: 2022-01-12 | End: 2022-07-11 | Stop reason: SDUPTHER

## 2022-01-12 RX ORDER — GLIMEPIRIDE 2 MG/1
2 TABLET ORAL
Qty: 180 TABLET | Refills: 1 | Status: SHIPPED | OUTPATIENT
Start: 2022-01-12 | End: 2022-07-11 | Stop reason: SDUPTHER

## 2022-01-12 RX ORDER — LIRAGLUTIDE 6 MG/ML
1.8 INJECTION SUBCUTANEOUS DAILY
Qty: 27 PEN | Refills: 1 | Status: SHIPPED | OUTPATIENT
Start: 2022-01-12 | End: 2022-07-11 | Stop reason: SDUPTHER

## 2022-01-12 RX ORDER — PIOGLITAZONEHYDROCHLORIDE 30 MG/1
30 TABLET ORAL DAILY
Qty: 90 TABLET | Refills: 1 | Status: SHIPPED | OUTPATIENT
Start: 2022-01-12 | End: 2022-07-12 | Stop reason: SINTOL

## 2022-01-12 RX ORDER — ATORVASTATIN CALCIUM 40 MG/1
40 TABLET, FILM COATED ORAL DAILY
Qty: 90 TABLET | Refills: 1 | Status: SHIPPED | OUTPATIENT
Start: 2022-01-12 | End: 2022-07-11 | Stop reason: SDUPTHER

## 2022-01-12 NOTE — PROGRESS NOTES
Subjective   Junior Pollard Jr. is a 66 y.o. male.     History of Present Illness     Chief Complaint:   Chief Complaint   Patient presents with   • Diabetes     med refill / lab results / meds reviewed with pt today   • Hypertension     [facility]    • Hyperlipidemia       Junior Pollard Jr. 66 y.o. male who presents today for Medical Management of the below listed issues and medication refills. He  has a problem list of   Patient Active Problem List   Diagnosis   • Abnormal cardiovascular stress test   • Multiple actinic keratoses   • Chronic coronary artery disease   • Benign essential hypertension   • Stage 3b chronic kidney disease (HCC)   • Diabetes mellitus with renal complications (HCC)   • Hypercholesterolemia   • Elevated creatine kinase level   • Onychomycosis of toenail   • Osteoarthritis   • Proteinuria   • Type 2 diabetes with nephropathy (HCC)   • Acute diverticulitis   • Diarrhea   • Fever   • Lower abdominal pain   • Uncontrolled type 2 diabetes mellitus with hyperglycemia (HCC)   • CORY (acute kidney injury) (HCC)   • Clostridium difficile colitis   • History of diverticulitis   • Cholelithiases   • Sepsis without acute organ dysfunction (present on admit)   • Primary osteoarthritis of both knees   .  Since the last visit, He has overall felt well.  he has been compliant with   Current Outpatient Medications:   •  aspirin (aspirin) 81 MG EC tablet, Take 81 mg by mouth Daily., Disp: , Rfl:   •  atorvastatin (Lipitor) 40 MG tablet, Take 1 tablet by mouth Daily., Disp: 90 tablet, Rfl: 1  •  Blood Glucose Monitoring Suppl (Accu-Chek Jordyn Plus) w/Device kit, Use as directed to check BS daily E11.9, Disp: 1 kit, Rfl: 0  •  ezetimibe (Zetia) 10 MG tablet, Take 1 tablet by mouth Daily., Disp: 90 tablet, Rfl: 1  •  ferrous sulfate (FeroSul) 325 (65 FE) MG tablet, Take 1 tablet by mouth Daily With Breakfast., Disp: 90 tablet, Rfl: 1  •  glimepiride (Amaryl) 2 MG tablet, Take 1 tablet by mouth 2 (Two)  "Times a Day., Disp: 180 tablet, Rfl: 1  •  glucose blood (Accu-Chek Jordyn Plus) test strip, 1 each by Other route Daily. Use as instructed, Disp: 100 each, Rfl: 3  •  Insulin Pen Needle 32G X 4 MM misc, Use QD, Disp: 100 each, Rfl: 3  •  Lancets (accu-chek soft touch) lancets, Use as directed to test once daily E11.9, Disp: 100 each, Rfl: 3  •  Liraglutide (Victoza) 18 MG/3ML solution pen-injector injection, Inject 1.8 mg under the skin into the appropriate area as directed Daily., Disp: 27 pen, Rfl: 1  •  lisinopril (PRINIVIL,ZESTRIL) 5 MG tablet, Take 1 tablet by mouth Daily., Disp: 90 tablet, Rfl: 1  •  pioglitazone (ACTOS) 30 MG tablet, Take 1 tablet by mouth Daily., Disp: 90 tablet, Rfl: 1.  He denies medication side effects.  Pt has been referred to GI since 2018 for colonoscopy and anemia w/u but has continued not to go.  All of the other chronic condition(s) listed above are stable w/o issues.    /62   Pulse 82   Temp 96.8 °F (36 °C) (Oral)   Resp 16   Ht 182.9 cm (72.01\")   Wt 121 kg (266 lb)   BMI 36.07 kg/m²     Results for orders placed or performed in visit on 06/19/21   Lipid panel    Specimen: Blood   Result Value Ref Range    Total Cholesterol 112 100 - 199 mg/dL    Triglycerides 65 0 - 149 mg/dL    HDL Cholesterol 36 (L) >39 mg/dL    VLDL Cholesterol [place] 14 5 - 40 mg/dL    LDL Chol Calc (NIH) 62 0 - 99 mg/dL   TSH    Specimen: Blood   Result Value Ref Range    TSH 2.240 0.450 - 4.500 uIU/mL   PSA    Specimen: Blood   Result Value Ref Range    PSA 0.5 0.0 - 4.0 ng/mL   Hemoglobin A1c    Specimen: Blood   Result Value Ref Range    Hemoglobin A1C 6.5 (H) 4.8 - 5.6 %   MicroAlbumin, Urine, Random - Urine, Clean Catch    Specimen: Urine, Clean Catch   Result Value Ref Range    Microalbumin, Urine 59.7 Not Estab. ug/mL             The following portions of the patient's history were reviewed and updated as appropriate: allergies, current medications, past family history, past medical history, " past social history, past surgical history, and problem list.    Review of Systems   Constitutional: Negative for activity change, chills and fever.   Respiratory: Negative for cough.    Cardiovascular: Negative for chest pain.   Musculoskeletal: Positive for arthralgias (chronic knees).   Psychiatric/Behavioral: Negative for dysphoric mood.       Objective   Physical Exam  Constitutional:       General: He is not in acute distress.     Appearance: He is well-developed.   Cardiovascular:      Rate and Rhythm: Normal rate and regular rhythm.   Pulmonary:      Effort: Pulmonary effort is normal.      Breath sounds: Normal breath sounds.   Neurological:      Mental Status: He is alert and oriented to person, place, and time.   Psychiatric:         Behavior: Behavior normal.         Thought Content: Thought content normal.     Labs reviewed with pt today during visit. All questions answered.        Assessment/Plan   Diagnoses and all orders for this visit:    1. Type 2 diabetes with nephropathy (HCC) (Primary)  -     glimepiride (Amaryl) 2 MG tablet; Take 1 tablet by mouth 2 (Two) Times a Day.  Dispense: 180 tablet; Refill: 1  -     Insulin Pen Needle 32G X 4 MM misc; Use QD  Dispense: 100 each; Refill: 3  -     Lancets (accu-chek soft touch) lancets; Use as directed to test once daily E11.9  Dispense: 100 each; Refill: 3  -     Liraglutide (Victoza) 18 MG/3ML solution pen-injector injection; Inject 1.8 mg under the skin into the appropriate area as directed Daily.  Dispense: 27 pen; Refill: 1  -     pioglitazone (ACTOS) 30 MG tablet; Take 1 tablet by mouth Daily.  Dispense: 90 tablet; Refill: 1  -     Basic Metabolic Panel; Future  -     Hemoglobin A1c; Future  -     glucose blood (Accu-Chek Jordyn Plus) test strip; 1 each by Other route Daily. Use as instructed  Dispense: 100 each; Refill: 3    2. Hypercholesterolemia  -     ezetimibe (Zetia) 10 MG tablet; Take 1 tablet by mouth Daily.  Dispense: 90 tablet; Refill:  1  -     atorvastatin (Lipitor) 40 MG tablet; Take 1 tablet by mouth Daily.  Dispense: 90 tablet; Refill: 1    3. Benign essential hypertension  -     lisinopril (PRINIVIL,ZESTRIL) 5 MG tablet; Take 1 tablet by mouth Daily.  Dispense: 90 tablet; Refill: 1  -     Hemoglobin A1c; Future    4. Anemia, unspecified type  -     ferrous sulfate (FeroSul) 325 (65 FE) MG tablet; Take 1 tablet by mouth Daily With Breakfast.  Dispense: 90 tablet; Refill: 1  -     CBC & Differential; Future    5. Primary osteoarthritis of both knees  -     Ambulatory Referral to Orthopedic Surgery

## 2022-01-17 ENCOUNTER — OFFICE VISIT (OUTPATIENT)
Dept: ORTHOPEDIC SURGERY | Facility: CLINIC | Age: 67
End: 2022-01-17

## 2022-01-17 VITALS — WEIGHT: 265 LBS | RESPIRATION RATE: 18 BRPM | BODY MASS INDEX: 35.89 KG/M2 | TEMPERATURE: 97.5 F | HEIGHT: 72 IN

## 2022-01-17 DIAGNOSIS — M17.0 BILATERAL PRIMARY OSTEOARTHRITIS OF KNEE: Primary | ICD-10-CM

## 2022-01-17 DIAGNOSIS — R52 PAIN: ICD-10-CM

## 2022-01-17 PROCEDURE — 99203 OFFICE O/P NEW LOW 30 MIN: CPT | Performed by: ORTHOPAEDIC SURGERY

## 2022-01-17 PROCEDURE — 72170 X-RAY EXAM OF PELVIS: CPT | Performed by: ORTHOPAEDIC SURGERY

## 2022-01-17 PROCEDURE — 73562 X-RAY EXAM OF KNEE 3: CPT | Performed by: ORTHOPAEDIC SURGERY

## 2022-01-17 RX ORDER — MULTIPLE VITAMINS W/ MINERALS TAB 9MG-400MCG
1 TAB ORAL DAILY
COMMUNITY

## 2022-01-18 ENCOUNTER — TELEPHONE (OUTPATIENT)
Dept: ORTHOPEDIC SURGERY | Facility: CLINIC | Age: 67
End: 2022-01-18

## 2022-01-18 NOTE — TELEPHONE ENCOUNTER
Provider: KALEB  Caller: JENN   Phone Number: [phone]  Reason for Call:  PT WANTS TO INFORM YOU THAT THE SAMPLE MEDICATION   YOU GAVE HIM WAS NOT COVERED  BUT IS ASKING IF THE GENERIC WOULD BE OKAY BECAUSE IT IS.    HUMAN ALSO INFORMED HIM THAT IF YOU CALLED YOU COULD GET IT ADDED TO A PREFERRED LIST AND THEY COULD BE COVER IT GOING FORWARD.

## 2022-01-19 NOTE — PROGRESS NOTES
"Patient ID: Junior Pollard Jr.     Chief Complaint:    No chief complaint on file.       HPI:    Junior Pollard Jr. is a 66 y.o. who presents today for evaluation of knee pain.  States he has bilateral knee pain but mainly his left is worse.  States that he has had issues for years.  Patient is retired .  He is here with his daughter.  States he cannot take anti-inflammatories as he has stage III kidney disease.  Describes pain as pain is general.  Does have history of diabetes states his last hemoglobin A1c was 6.5.  As noted above kidney disease.  States he has not seen a dentist in a while.    Social History     Socioeconomic History   • Marital status:    Tobacco Use   • Smoking status: Never Smoker   • Smokeless tobacco: Never Used   Vaping Use   • Vaping Use: Never used   Substance and Sexual Activity   • Alcohol use: No   • Drug use: No   • Sexual activity: Yes     Partners: Female     Past Medical History:   Diagnosis Date   • Arthritis    • Diabetes mellitus (HCC)    • Diabetic eye exam ([facility]) 01/24/2017    DUE   • Dislocation of finger 1972   • Hyperlipidemia    • Hypertension    • Knee swelling      Family History   Problem Relation Age of Onset   • Heart disease Mother    • Diabetes Mother    • Heart disease Father    • Hypertension Father    • Diabetes Father        ROS:    ROS:  Constitutional:  Denies fever, shaking or chills         All other pertinent positives and negatives as noted above in HPI.    Physical Exam:     Vital Signs:  Temp 97.5 °F (36.4 °C)   Resp 18   Ht 182.9 cm (72\")   Wt 120 kg (265 lb)   BMI 35.94 kg/m²   Constitutional: Awake alert and oriented x3, well developed, well nourished, no acute distress, non-toxic appearance.    Musculoskeletal:    Exam of the left  knee:    No muscle atrophy, erythema, ecchymosis, or gross deformity noted  mild knee effusion  no joint line tenderness  Active range of motion 5-90  5/5 strength flexion and extension  The knee " is stable to varus and valgus stress testing  Mild varus alignment of the limb  Lachman negative  Posterior drawer negative  Isaiah's negative  Patellofemoral grind +  Sensation grossly intact to light tough throughout the lower extremity  Skin is intact  Distal pulses are 2+  No signs or symptoms of DVT        Bilateral Hip exam:  No atrophy, erythema, ecchymosis, or gross deformity noted  No tenderness to palpation  Slightly dimished active range of motion, mild lack of IR but nonpainful  5/5 strength in hip flexion, abduction, and adduction  Anterior, lateral, and posterior hip impingement tests negative  Stinchfield and straight leg raise negative  AN negative        Diagnostic Studies:     Imaging was personally and individually reviewed and discussed at length with the patient:    4V left knee(s) were taken in the office today, including AP, flexion PA, lateral, and sunrise views to evaluate the patient's complaint:  Weight bearing views show moderate degenerative changes in all three compartments with the medial compartment being most affected which is severe and is bone-on-bone.  There is early osteophyte formation throughout all three compartments.  There is no evidence of fracture or dislocation.  No periosteal reactions or medullary lesions are seen.  Patellar height and alignment are within normal limits.     Comparison films not available    AP pelvis was taken in the office today: Mild degenerative changes bilateral hip joints no acute fracture.            Assessment:     bilateral  Knee Osteoarthritis            Plan:     Based on x-rays, history, and office evaluation, we have diagnosed Junior Pollard  with knee arthritis. At this time, we recommend starting with a conservative treatment program. This will consist of cortisone injection during significant flare-ups, NSAIDS for daily maintenance, physical therapy for strengthening and modalities as indicated, and bracing when appropriate.  These measures will continue, until symptoms are no longer relieved, become more severe or function begins to significantly deteriorate. At that point joint replacement options will be discussed.      Patient would like to try over-the-counter and possible prescription gel as he cannot take oral anti-inflammatories given the kidney disease he would like to hold off on any therapy or injections at this time.    Follow up in as needed unless symptoms return or a new issue occurs.  Patient will call the office to schedule an appointment.     All questions were answered, the patient understands and agrees with the plan.

## 2022-01-20 ENCOUNTER — PATIENT ROUNDING (BHMG ONLY) (OUTPATIENT)
Dept: ORTHOPEDIC SURGERY | Facility: CLINIC | Age: 67
End: 2022-01-20

## 2022-01-20 ENCOUNTER — TELEPHONE (OUTPATIENT)
Dept: ORTHOPEDIC SURGERY | Facility: CLINIC | Age: 67
End: 2022-01-20

## 2022-01-20 NOTE — TELEPHONE ENCOUNTER
Spoke with patient and he said that is all he needed ot know.  He does not need anything more at this point

## 2022-01-20 NOTE — TELEPHONE ENCOUNTER
Caller: Junior Pollard Jr.    Relationship: Self    Best call back number: [phone]    What medication are you requesting:  GENERIC         If a prescription is needed, what is your preferred pharmacy and phone number:  [facility] [phone]    Additional notes:PATIENT CALL INTO GET A SCRIPT FOR 90 DAY SUPPLY OF THE GENERIC MEDICATION(PENNSAID) THAT HE WAS GIVEN A SAMPLE OFF. THIS SCRIPT CAN BE SENT TO [id number] AT THE [facility]. PLEASE CONTACT THE PATIENT AT ABOVE NUMBER  WITH ANY QUESTIONS AND THEN THIS ORDER IN SENT OVER. THANK YOU.

## 2022-01-20 NOTE — TELEPHONE ENCOUNTER
Patient is going to try the Voltaren gel since his ins comp wont cover the pennsaid. He will call us back if he has any further questions or concerns.

## 2022-01-20 NOTE — PROGRESS NOTES
January 20, 2022    A [facility] message has been sent to the patient for PATIENT ROUNDING with [facility] [place]

## 2022-02-07 NOTE — TELEPHONE ENCOUNTER
Rcvd letter from [facility] in regards to Victoza. It is a covered medication but may be a higher cost.  I called Mr Pollard and spoke to his wife Annabelle about the medication to make sure it wasn't to expensive.  She said that what happened was [facility] tried to fill it early and it sent a PA to me then. They have been getting the medication at their regular copay no problems. I suggested mail order but she said their  isn't reliable so they will continue to use [facility] just get a 90 day supply from them.   Next refill due in April and they will contact me directly if there is any issues.     Nancy

## 2022-07-11 RX ORDER — FERROUS SULFATE 325(65) MG
325 TABLET ORAL
Qty: 90 TABLET | Refills: 1 | Status: CANCELLED | OUTPATIENT
Start: 2022-07-11

## 2022-07-11 RX ORDER — PIOGLITAZONEHYDROCHLORIDE 30 MG/1
30 TABLET ORAL DAILY
Qty: 90 TABLET | Refills: 1 | Status: CANCELLED | OUTPATIENT
Start: 2022-07-11

## 2022-07-11 NOTE — PROGRESS NOTES
Subjective   Junior Pollard Jr. is a 66 y.o. male.     History of Present Illness     Chief Complaint:   Chief Complaint   Patient presents with   • Diabetes     Med refill / lab results   • Hypertension     Diabetic foot exam due    • Hyperlipidemia   • MEDICARE WELLNESS     DUE        Junior Pollard Jr. 66 y.o. male who presents today for Medical Management of the below listed issues. He  has a problem list of   Patient Active Problem List   Diagnosis   • Abnormal cardiovascular stress test   • Multiple actinic keratoses   • Chronic coronary artery disease   • Benign essential hypertension   • Stage 3b chronic kidney disease (HCC)   • Diabetes mellitus with renal complications (HCC)   • Hypercholesterolemia   • Elevated creatine kinase level   • Onychomycosis of toenail   • Osteoarthritis   • Proteinuria   • Type 2 diabetes with nephropathy (HCC)   • Acute diverticulitis   • Diarrhea   • Fever   • Lower abdominal pain   • Uncontrolled type 2 diabetes mellitus with hyperglycemia (HCC)   • CORY (acute kidney injury) (HCC)   • Clostridium difficile colitis   • History of diverticulitis   • Cholelithiases   • Sepsis without acute organ dysfunction (present on admit)   • Primary osteoarthritis of both knees   .  Since the last visit, He has overall felt well.  he has been compliant with   Current Outpatient Medications:   •  atorvastatin (Lipitor) 40 MG tablet, Take 1 tablet by mouth Daily., Disp: 90 tablet, Rfl: 1  •  ezetimibe (Zetia) 10 MG tablet, Take 1 tablet by mouth Daily., Disp: 90 tablet, Rfl: 1  •  glimepiride (Amaryl) 2 MG tablet, Take 1 tablet by mouth 2 (Two) Times a Day., Disp: 180 tablet, Rfl: 1  •  Liraglutide (Victoza) 18 MG/3ML solution pen-injector injection, Inject 1.8 mg under the skin into the appropriate area as directed Daily., Disp: 27 pen, Rfl: 1  •  lisinopril (PRINIVIL,ZESTRIL) 5 MG tablet, Take 1 tablet by mouth Daily., Disp: 90 tablet, Rfl: 1  •  aspirin (aspirin) 81 MG EC tablet,  "Take 81 mg by mouth Daily., Disp: , Rfl:   •  Blood Glucose Monitoring Suppl (Accu-Chek Jordyn Plus) w/Device kit, Use as directed to check BS daily E11.9, Disp: 1 kit, Rfl: 0  •  dapagliflozin (Farxiga) 5 MG tablet tablet, Take 1 tablet by mouth Daily. For DM and CKD, Disp: 90 tablet, Rfl: 1  •  glucose blood (Accu-Chek Jordyn Plus) test strip, 1 each by Other route Daily. Use as instructed, Disp: 100 each, Rfl: 3  •  Insulin Pen Needle 32G X 4 MM misc, Use QD, Disp: 100 each, Rfl: 3  •  Lancets (accu-chek soft touch) lancets, Use as directed to test once daily E11.9, Disp: 100 each, Rfl: 3  •  multivitamin with minerals (MULTIVITAMIN ADULTS 50+ PO), Take 1 tablet by mouth Daily., Disp: , Rfl: .  He denies medication side effects.    All of the other chronic condition(s) listed above are stable w/o issues.    /68   Pulse 76   Temp 97.8 °F (36.6 °C) (Oral)   Ht 182.9 cm (72\")   Wt 125 kg (275 lb)   BMI 37.30 kg/m²     Results for orders placed or performed in visit on 06/11/22   Basic Metabolic Panel    Specimen: Blood   Result Value Ref Range    Glucose 84 65 - 99 mg/dL    BUN 49 (H) 8 - 27 mg/dL    Creatinine 2.27 (H) 0.76 - 1.27 mg/dL    EGFR Result 31 (L) >59 mL/min/1.73    BUN/Creatinine Ratio 22 10 - 24    Sodium 141 134 - 144 mmol/L    Potassium 5.0 3.5 - 5.2 mmol/L    Chloride 105 96 - 106 mmol/L    Total CO2 22 20 - 29 mmol/L    Calcium 9.5 8.6 - 10.2 mg/dL   Hemoglobin A1c    Specimen: Blood   Result Value Ref Range    Hemoglobin A1C 7.0 (H) 4.8 - 5.6 %   CBC & Differential    Specimen: Blood   Result Value Ref Range    WBC 7.2 3.4 - 10.8 x10E3/uL    RBC 3.97 (L) 4.14 - 5.80 x10E6/uL    Hemoglobin 12.2 (L) 13.0 - 17.7 g/dL    Hematocrit 37.1 (L) 37.5 - 51.0 %    MCV 94 79 - 97 fL    MCH 30.7 26.6 - 33.0 pg    MCHC 32.9 31.5 - 35.7 g/dL    RDW 11.9 11.6 - 15.4 %    Platelets 238 150 - 450 x10E3/uL    Neutrophil Rel % 41 Not Estab. %    Lymphocyte Rel % 48 Not Estab. %    Monocyte Rel % 8 Not Estab. " %    Eosinophil Rel % 2 Not Estab. %    Basophil Rel % 1 Not Estab. %    Neutrophils Absolute 3.0 1.4 - 7.0 x10E3/uL    Lymphocytes Absolute 3.4 (H) 0.7 - 3.1 x10E3/uL    Monocytes Absolute 0.6 0.1 - 0.9 x10E3/uL    Eosinophils Absolute 0.2 0.0 - 0.4 x10E3/uL    Basophils Absolute 0.1 0.0 - 0.2 x10E3/uL    Immature Granulocyte Rel % 0 Not Estab. %    Immature Grans Absolute 0.0 0.0 - 0.1 x10E3/uL             The following portions of the patient's history were reviewed and updated as appropriate: allergies, current medications, past family history, past medical history, past social history, past surgical history, and problem list.    Review of Systems   Constitutional: Negative for activity change, chills and fever.   Respiratory: Negative for cough.    Cardiovascular: Negative for chest pain.   Psychiatric/Behavioral: Negative for dysphoric mood.       Objective   Physical Exam  Constitutional:       General: He is not in acute distress.     Appearance: He is well-developed.   Cardiovascular:      Rate and Rhythm: Normal rate and regular rhythm.   Pulmonary:      Effort: Pulmonary effort is normal.      Breath sounds: Normal breath sounds.   Neurological:      Mental Status: He is alert and oriented to person, place, and time.   Psychiatric:         Behavior: Behavior normal.         Thought Content: Thought content normal.     Labs reviewed with pt today during visit. All questions answered.          Diagnoses and all orders for this visit:    1. Medicare annual wellness visit, subsequent (Primary)    2. Type 2 diabetes with nephropathy (HCC)  Comments:  active management changes at today's visit  Orders:  -     glimepiride (Amaryl) 2 MG tablet; Take 1 tablet by mouth 2 (Two) Times a Day.  Dispense: 180 tablet; Refill: 1  -     Liraglutide (Victoza) 18 MG/3ML solution pen-injector injection; Inject 1.8 mg under the skin into the appropriate area as directed Daily.  Dispense: 27 pen; Refill: 1  -     Comprehensive  metabolic panel; Future  -     Lipid panel; Future  -     Hemoglobin A1c; Future  -     MicroAlbumin, Urine, Random - Urine, Clean Catch; Future  -     dapagliflozin (Farxiga) 5 MG tablet tablet; Take 1 tablet by mouth Daily. For DM and CKD  Dispense: 90 tablet; Refill: 1    3. Hypercholesterolemia  -     ezetimibe (Zetia) 10 MG tablet; Take 1 tablet by mouth Daily.  Dispense: 90 tablet; Refill: 1  -     atorvastatin (Lipitor) 40 MG tablet; Take 1 tablet by mouth Daily.  Dispense: 90 tablet; Refill: 1  -     Lipid panel; Future    4. Benign essential hypertension  -     lisinopril (PRINIVIL,ZESTRIL) 5 MG tablet; Take 1 tablet by mouth Daily.  Dispense: 90 tablet; Refill: 1  -     Comprehensive metabolic panel; Future  -     Lipid panel; Future  -     CBC and Differential; Future  -     TSH; Future    5. Stage 3b chronic kidney disease (HCC)  Comments:  worsening, addressed at today's visit  Orders:  -     dapagliflozin (Farxiga) 5 MG tablet tablet; Take 1 tablet by mouth Daily. For DM and CKD  Dispense: 90 tablet; Refill: 1    6. Special screening for malignant neoplasm of prostate  -     PSA; Future    Pt has a nephrologist and saw him yesterday where his Actos was d/c. Pt's A1C creeping upward and, since now withdrawing one of his DM medications, would be expected to rise further. Will attempt to get Farxiga to replace this with, due to good efficacy as well as indication for Renal Protective effect. Will stick with the lower 5mg dose as already on Victoza and Amaryl. Pt f/u with Nephrology in 3 months for further labs at that time.

## 2022-07-12 ENCOUNTER — OFFICE VISIT (OUTPATIENT)
Dept: FAMILY MEDICINE CLINIC | Facility: CLINIC | Age: 67
End: 2022-07-12

## 2022-07-12 VITALS
SYSTOLIC BLOOD PRESSURE: 124 MMHG | WEIGHT: 275 LBS | HEIGHT: 72 IN | DIASTOLIC BLOOD PRESSURE: 68 MMHG | BODY MASS INDEX: 37.25 KG/M2 | HEART RATE: 76 BPM | TEMPERATURE: 97.8 F

## 2022-07-12 DIAGNOSIS — I10 BENIGN ESSENTIAL HYPERTENSION: ICD-10-CM

## 2022-07-12 DIAGNOSIS — N18.32 STAGE 3B CHRONIC KIDNEY DISEASE: ICD-10-CM

## 2022-07-12 DIAGNOSIS — E11.21 TYPE 2 DIABETES WITH NEPHROPATHY: Chronic | ICD-10-CM

## 2022-07-12 DIAGNOSIS — Z00.00 MEDICARE ANNUAL WELLNESS VISIT, SUBSEQUENT: Primary | ICD-10-CM

## 2022-07-12 DIAGNOSIS — E78.00 HYPERCHOLESTEROLEMIA: Chronic | ICD-10-CM

## 2022-07-12 DIAGNOSIS — Z12.5 SPECIAL SCREENING FOR MALIGNANT NEOPLASM OF PROSTATE: ICD-10-CM

## 2022-07-12 PROCEDURE — 1170F FXNL STATUS ASSESSED: CPT | Performed by: FAMILY MEDICINE

## 2022-07-12 PROCEDURE — 96160 PT-FOCUSED HLTH RISK ASSMT: CPT | Performed by: FAMILY MEDICINE

## 2022-07-12 PROCEDURE — 99214 OFFICE O/P EST MOD 30 MIN: CPT | Performed by: FAMILY MEDICINE

## 2022-07-12 PROCEDURE — G0439 PPPS, SUBSEQ VISIT: HCPCS | Performed by: FAMILY MEDICINE

## 2022-07-12 PROCEDURE — 1159F MED LIST DOCD IN RCRD: CPT | Performed by: FAMILY MEDICINE

## 2022-07-12 RX ORDER — ATORVASTATIN CALCIUM 40 MG/1
40 TABLET, FILM COATED ORAL DAILY
Qty: 90 TABLET | Refills: 1 | Status: SHIPPED | OUTPATIENT
Start: 2022-07-12 | End: 2023-01-19 | Stop reason: SDUPTHER

## 2022-07-12 RX ORDER — LISINOPRIL 5 MG/1
5 TABLET ORAL DAILY
Qty: 90 TABLET | Refills: 1 | Status: SHIPPED | OUTPATIENT
Start: 2022-07-12 | End: 2023-01-19 | Stop reason: SDUPTHER

## 2022-07-12 RX ORDER — GLIMEPIRIDE 2 MG/1
2 TABLET ORAL
Qty: 180 TABLET | Refills: 1 | Status: SHIPPED | OUTPATIENT
Start: 2022-07-12 | End: 2023-01-19 | Stop reason: SDUPTHER

## 2022-07-12 RX ORDER — DAPAGLIFLOZIN 5 MG/1
5 TABLET, FILM COATED ORAL DAILY
Qty: 90 TABLET | Refills: 1 | Status: SHIPPED | OUTPATIENT
Start: 2022-07-12 | End: 2022-10-20 | Stop reason: SDUPTHER

## 2022-07-12 RX ORDER — LIRAGLUTIDE 6 MG/ML
1.8 INJECTION SUBCUTANEOUS DAILY
Qty: 27 PEN | Refills: 1 | Status: SHIPPED | OUTPATIENT
Start: 2022-07-12 | End: 2023-01-19 | Stop reason: SDUPTHER

## 2022-07-12 RX ORDER — EZETIMIBE 10 MG/1
10 TABLET ORAL DAILY
Qty: 90 TABLET | Refills: 1 | Status: SHIPPED | OUTPATIENT
Start: 2022-07-12 | End: 2023-01-19 | Stop reason: SDUPTHER

## 2022-07-12 NOTE — PATIENT INSTRUCTIONS
Medicare Wellness  Personal Prevention Plan of Service     Date of Office Visit:    Encounter Provider:  Boogie Interiano MD  Place of Service:  [facility]  Patient Name: Junior Pollard Jr.  :  1955    As part of the Medicare Wellness portion of your visit today, we are providing you with this personalized preventive plan of services (PPPS). This plan is based upon recommendations of the [place] Preventive Services Task Force (USPSTF) and the Advisory Committee on Immunization Practices (ACIP).    This lists the preventive care services that should be considered, and provides dates of when you are due. Items listed as completed are up-to-date and do not require any further intervention.    Health Maintenance   Topic Date Due    Pneumococcal Vaccine 65+ (1 - PCV) Never done    DIABETIC FOOT EXAM  01/15/2021    ZOSTER VACCINE (2 of 2) 2022 (Originally 2014)    COVID-19 Vaccine (2 - Moderna series) 2022 (Originally 2022)    DIABETIC EYE EXAM  2022 (Originally 7/15/2021)    INFLUENZA VACCINE  10/01/2022    HEMOGLOBIN A1C  2022    PROSTATE CANCER SCREENING  2023    LIPID PANEL  2023    URINE MICROALBUMIN  2023    ANNUAL WELLNESS VISIT  2023    TDAP/TD VACCINES (3 - Td or Tdap) 2028    HEPATITIS C SCREENING  Discontinued    COLORECTAL CANCER SCREENING  Discontinued       No orders of the defined types were placed in this encounter.      Return in about 6 months (around 2023) for Recheck.

## 2022-07-12 NOTE — PROGRESS NOTES
The ABCs of the Annual Wellness Visit  Subsequent Medicare Wellness Visit    Chief Complaint   Patient presents with   • Diabetes     Med refill / lab results   • Hypertension     Diabetic foot exam due    • Hyperlipidemia   • MEDICARE WELLNESS     DUE       Subjective    History of Present Illness:  Junior Pollard Jr. is a 66 y.o. male who presents for a Subsequent Medicare Wellness Visit.    The following portions of the patient's history were reviewed and   updated as appropriate: allergies, current medications, past family history, past medical history, past social history, past surgical history and problem list.    Compared to one year ago, the patient feels his physical   health is the same.    Compared to one year ago, the patient feels his mental   health is the same.    Recent Hospitalizations:  He was not admitted to the hospital during the last year.       Current Medical Providers:  Patient Care Team:  Boogie Interiano MD as PCP - General (Family Medicine)  Gerald Bravo MD as Consulting Physician (Nephrology)    Outpatient Medications Prior to Visit   Medication Sig Dispense Refill   • ferrous sulfate (FeroSul) 325 (65 FE) MG tablet Take 1 tablet by mouth Daily With Breakfast. 90 tablet 1   • pioglitazone (ACTOS) 30 MG tablet Take 1 tablet by mouth Daily. 90 tablet 1   • aspirin (aspirin) 81 MG EC tablet Take 81 mg by mouth Daily.     • Blood Glucose Monitoring Suppl (Accu-Chek Jordyn Plus) w/Device kit Use as directed to check BS daily E11.9 1 kit 0   • glucose blood (Accu-Chek Jordyn Plus) test strip 1 each by Other route Daily. Use as instructed 100 each 3   • Insulin Pen Needle 32G X 4 MM misc Use  each 3   • Lancets (accu-chek soft touch) lancets Use as directed to test once daily E11.9 100 each 3   • multivitamin with minerals (MULTIVITAMIN ADULTS 50+ PO) Take 1 tablet by mouth Daily.     • atorvastatin (Lipitor) 40 MG tablet Take 1 tablet by mouth Daily. 90 tablet 1   • ezetimibe  (Zetia) 10 MG tablet Take 1 tablet by mouth Daily. 90 tablet 1   • glimepiride (Amaryl) 2 MG tablet Take 1 tablet by mouth 2 (Two) Times a Day. 180 tablet 1   • Liraglutide (Victoza) 18 MG/3ML solution pen-injector injection Inject 1.8 mg under the skin into the appropriate area as directed Daily. 27 pen 1   • lisinopril (PRINIVIL,ZESTRIL) 5 MG tablet Take 1 tablet by mouth Daily. 90 tablet 1     No facility-administered medications prior to visit.       No opioid medication identified on active medication list. I have reviewed chart for other potential  high risk medication/s and harmful drug interactions in the elderly.          Aspirin is on active medication list. Aspirin use is indicated based on review of current medical condition/s. Pros and cons of this therapy have been discussed today. Benefits of this medication outweigh potential harm.  Patient has been encouraged to continue taking this medication.  .      Patient Active Problem List   Diagnosis   • Abnormal cardiovascular stress test   • Multiple actinic keratoses   • Chronic coronary artery disease   • Benign essential hypertension   • Stage 3b chronic kidney disease (HCC)   • Diabetes mellitus with renal complications (HCC)   • Hypercholesterolemia   • Elevated creatine kinase level   • Onychomycosis of toenail   • Osteoarthritis   • Proteinuria   • Type 2 diabetes with nephropathy (HCC)   • Acute diverticulitis   • Diarrhea   • Fever   • Lower abdominal pain   • Uncontrolled type 2 diabetes mellitus with hyperglycemia (HCC)   • CORY (acute kidney injury) (HCC)   • Clostridium difficile colitis   • History of diverticulitis   • Cholelithiases   • Sepsis without acute organ dysfunction (present on admit)   • Primary osteoarthritis of both knees     Advance Care Planning  Advance Directive is not on file.  ACP discussion was held with the patient during this visit. Patient does not have an advance directive, declines further assistance.         "  Objective    Vitals:    07/11/22 1136   BP: 124/68   Pulse: 76   Temp: 97.8 °F (36.6 °C)   TempSrc: Oral   Weight: 125 kg (275 lb)   Height: 182.9 cm (72\")     Estimated body mass index is 37.3 kg/m² as calculated from the following:    Height as of this encounter: 182.9 cm (72\").    Weight as of this encounter: 125 kg (275 lb).    Class 2 Severe Obesity (BMI >=35 and <=39.9). Obesity-related health conditions include the following: diabetes mellitus and dyslipidemias. Obesity is unchanged. BMI is is above average; BMI management plan is completed. We discussed portion control and increasing exercise.      Does the patient have evidence of cognitive impairment? No    Physical Exam  Lab Results   Component Value Date    HGBA1C 7.0 (H) 06/28/2022            HEALTH RISK ASSESSMENT    Smoking Status:  Social History     Tobacco Use   Smoking Status Never Smoker   Smokeless Tobacco Never Used     Alcohol Consumption:  Social History     Substance and Sexual Activity   Alcohol Use No     Fall Risk Screen:    AGAPITOADI Fall Risk Assessment has not been completed.    Depression Screening:  PHQ-2/PHQ-9 Depression Screening 7/12/2022   Retired PHQ-9 Total Score -   Retired Total Score -   Little Interest or Pleasure in Doing Things 0-->not at all   Feeling Down, Depressed or Hopeless 0-->not at all   PHQ-9: Brief Depression Severity Measure Score 0       Health Habits and Functional and Cognitive Screening:  Functional & Cognitive Status 7/12/2022   Do you have difficulty preparing food and eating? No   Do you have difficulty bathing yourself, getting dressed or grooming yourself? No   Do you have difficulty using the toilet? No   Do you have difficulty moving around from place to place? No   Do you have trouble with steps or getting out of a bed or a chair? No   Current Diet Well Balanced Diet   Dental Exam Up to date   Eye Exam Not up to date   Exercise (times per week) 4 times per week   Current Exercises Include Walking "   Current Exercise Activities Include -   Do you need help using the phone?  No   Are you deaf or do you have serious difficulty hearing?  Yes   Do you need help with transportation? No   Do you need help shopping? No   Do you need help preparing meals?  No   Do you need help with housework?  No   Do you need help with laundry? No   Do you need help taking your medications? No   Do you need help managing money? No   Do you ever drive or ride in a car without wearing a seat belt? No   Have you felt unusual stress, anger or loneliness in the last month? No   Who do you live with? Spouse   If you need help, do you have trouble finding someone available to you? Yes   Have you been bothered in the last four weeks by sexual problems? No   Do you have difficulty concentrating, remembering or making decisions? No       Age-appropriate Screening Schedule:  Refer to the list below for future screening recommendations based on patient's age, sex and/or medical conditions. Orders for these recommended tests are listed in the plan section. The patient has been provided with a written plan.    Health Maintenance   Topic Date Due   • DIABETIC FOOT EXAM  01/15/2021   • ZOSTER VACCINE (2 of 2) 07/12/2022 (Originally 2/26/2014)   • DIABETIC EYE EXAM  08/30/2022 (Originally 7/15/2021)   • INFLUENZA VACCINE  10/01/2022   • HEMOGLOBIN A1C  12/28/2022   • PROSTATE CANCER SCREENING  01/03/2023   • LIPID PANEL  01/03/2023   • URINE MICROALBUMIN  01/03/2023   • TDAP/TD VACCINES (3 - Td or Tdap) 07/30/2028              Assessment & Plan   CMS Preventative Services Quick Reference  Risk Factors Identified During Encounter  Cardiovascular Disease  The above risks/problems have been discussed with the patient.  Follow up actions/plans if indicated are seen below in the Assessment/Plan Section.  Pertinent information has been shared with the patient in the After Visit Summary.    Diagnoses and all orders for this visit:    1. Medicare annual  wellness visit, subsequent (Primary)    2. Type 2 diabetes with nephropathy (HCC)  -     glimepiride (Amaryl) 2 MG tablet; Take 1 tablet by mouth 2 (Two) Times a Day.  Dispense: 180 tablet; Refill: 1  -     Liraglutide (Victoza) 18 MG/3ML solution pen-injector injection; Inject 1.8 mg under the skin into the appropriate area as directed Daily.  Dispense: 27 pen; Refill: 1  -     Comprehensive metabolic panel; Future  -     Lipid panel; Future  -     Hemoglobin A1c; Future  -     MicroAlbumin, Urine, Random - Urine, Clean Catch; Future  -     dapagliflozin (Farxiga) 5 MG tablet tablet; Take 1 tablet by mouth Daily. For DM and CKD  Dispense: 90 tablet; Refill: 1    3. Hypercholesterolemia  -     ezetimibe (Zetia) 10 MG tablet; Take 1 tablet by mouth Daily.  Dispense: 90 tablet; Refill: 1  -     atorvastatin (Lipitor) 40 MG tablet; Take 1 tablet by mouth Daily.  Dispense: 90 tablet; Refill: 1  -     Lipid panel; Future    4. Benign essential hypertension  -     lisinopril (PRINIVIL,ZESTRIL) 5 MG tablet; Take 1 tablet by mouth Daily.  Dispense: 90 tablet; Refill: 1  -     Comprehensive metabolic panel; Future  -     Lipid panel; Future  -     CBC and Differential; Future  -     TSH; Future    5. Stage 3b chronic kidney disease (HCC)  Comments:  worsening  Orders:  -     dapagliflozin (Farxiga) 5 MG tablet tablet; Take 1 tablet by mouth Daily. For DM and CKD  Dispense: 90 tablet; Refill: 1    6. Special screening for malignant neoplasm of prostate  -     PSA; Future        Follow Up:   Return in about 6 months (around 1/12/2023) for Recheck.     An After Visit Summary and PPPS were made available to the patient.          I spent 10 minutes caring for Junior on this date of service. This time includes time spent by me in the following activities:preparing for the visit and reviewing tests

## 2022-10-20 DIAGNOSIS — E11.21 TYPE 2 DIABETES WITH NEPHROPATHY: Chronic | ICD-10-CM

## 2022-10-20 DIAGNOSIS — N18.32 STAGE 3B CHRONIC KIDNEY DISEASE: ICD-10-CM

## 2022-10-20 RX ORDER — DAPAGLIFLOZIN 5 MG/1
5 TABLET, FILM COATED ORAL DAILY
Qty: 90 TABLET | Refills: 0 | Status: SHIPPED | OUTPATIENT
Start: 2022-10-20 | End: 2023-01-22

## 2022-10-20 NOTE — TELEPHONE ENCOUNTER
PATIENT REQUESTING A 90 DAY SUPPLY FOR FARXIGA  5 MG TABLET . PT LAST SEEN 7/12/2022 FOR WELLNESS EXAM  . [facility]

## 2023-01-19 RX ORDER — DAPAGLIFLOZIN 5 MG/1
5 TABLET, FILM COATED ORAL DAILY
Qty: 90 TABLET | Refills: 0 | Status: CANCELLED | OUTPATIENT
Start: 2023-01-19

## 2023-01-22 NOTE — PROGRESS NOTES
Subjective   Junior Pollard Jr. is a 67 y.o. male.     History of Present Illness     Chief Complaint:   Chief Complaint   Patient presents with   • Diabetes     [facility] / diabetic foot exam due    • Hypertension   • Hyperlipidemia   • Arthritis     MED REFILL DUE  / LAB RESULTS       Junior Pollard Jr. 67 y.o. male who presents today for Medical Management of the below listed issues. He  has a problem list of   Patient Active Problem List   Diagnosis   • Abnormal cardiovascular stress test   • Multiple actinic keratoses   • Chronic coronary artery disease   • Benign essential hypertension   • Stage 3b chronic kidney disease (HCC)   • Diabetes mellitus with renal complications (HCC)   • Hypercholesterolemia   • Elevated creatine kinase level   • Onychomycosis of toenail   • Osteoarthritis   • Proteinuria   • Type 2 diabetes with nephropathy (HCC)   • Acute diverticulitis   • Diarrhea   • Fever   • Lower abdominal pain   • Uncontrolled type 2 diabetes mellitus with hyperglycemia (HCC)   • CORY (acute kidney injury) (HCC)   • Clostridium difficile colitis   • History of diverticulitis   • Cholelithiases   • Sepsis without acute organ dysfunction (present on admit)   • Primary osteoarthritis of both knees   .  Since the last visit, He has overall felt well.  he has been compliant with   Current Outpatient Medications:   •  atorvastatin (Lipitor) 40 MG tablet, Take 1 tablet by mouth Daily., Disp: 90 tablet, Rfl: 1  •  ezetimibe (Zetia) 10 MG tablet, Take 1 tablet by mouth Daily., Disp: 90 tablet, Rfl: 1  •  glimepiride (Amaryl) 2 MG tablet, Take 1 tablet by mouth 2 (Two) Times a Day., Disp: 180 tablet, Rfl: 1  •  Liraglutide (Victoza) 18 MG/3ML solution pen-injector injection, Inject 1.8 mg under the skin into the appropriate area as directed Daily., Disp: 9 mL, Rfl: 1  •  lisinopril (PRINIVIL,ZESTRIL) 5 MG tablet, Take 1 tablet by mouth Daily., Disp: 90 tablet, Rfl: 1  •  aspirin (aspirin) 81 MG EC tablet,  "Take 81 mg by mouth Daily., Disp: , Rfl:   •  Blood Glucose Monitoring Suppl (Accu-Chek Jordyn Plus) w/Device kit, Use as directed to check BS daily E11.9, Disp: 1 kit, Rfl: 0  •  dapagliflozin Propanediol (Farxiga) 10 MG tablet, Take 10 mg by mouth Daily., Disp: 90 tablet, Rfl: 1  •  glucose blood (Accu-Chek Jordyn Plus) test strip, 1 each by Other route Daily. Use as instructed, Disp: 100 each, Rfl: 3  •  Insulin Pen Needle 32G X 4 MM misc, Use QD, Disp: 100 each, Rfl: 3  •  Lancets (accu-chek soft touch) lancets, Use as directed to test once daily E11.9, Disp: 100 each, Rfl: 3  •  multivitamin with minerals (MULTIVITAMIN ADULTS 50+ PO), Take 1 tablet by mouth Daily., Disp: , Rfl: .  He denies medication side effects.    All of the other chronic condition(s) listed above are stable w/o issues.    /68   Pulse 70   Temp 97.5 °F (36.4 °C) (Oral)   Resp 16   Ht 182.9 cm (72\")   Wt 115 kg (254 lb)   SpO2 98%   BMI 34.45 kg/m²     Results for orders placed or performed in visit on 12/11/22   Comprehensive metabolic panel    Specimen: Blood   Result Value Ref Range    Glucose 110 (H) 65 - 99 mg/dL    BUN 41 (H) 8 - 23 mg/dL    Creatinine 1.73 (H) 0.76 - 1.27 mg/dL    EGFR Result 42.2 (L) >60.0 mL/min/1.73    BUN/Creatinine Ratio 23.7 7.0 - 25.0    Sodium 141 136 - 145 mmol/L    Potassium 4.4 3.5 - 5.2 mmol/L    Chloride 104 98 - 107 mmol/L    Total CO2 25.4 22.0 - 29.0 mmol/L    Calcium 9.6 8.6 - 10.5 mg/dL    Total Protein 7.0 6.0 - 8.5 g/dL    Albumin 4.2 3.5 - 5.2 g/dL    Globulin 2.8 gm/dL    A/G Ratio 1.5 g/dL    Total Bilirubin 0.6 0.0 - 1.2 mg/dL    Alkaline Phosphatase 77 39 - 117 U/L    AST (SGOT) 18 1 - 40 U/L    ALT (SGPT) 22 1 - 41 U/L   Lipid panel    Specimen: Blood   Result Value Ref Range    Total Cholesterol 118 0 - 200 mg/dL    Triglycerides 91 0 - 150 mg/dL    HDL Cholesterol 33 (L) 40 - 60 mg/dL    VLDL Cholesterol Segundo 18 5 - 40 mg/dL    LDL Chol Calc (NIH) 67 0 - 100 mg/dL   TSH    " Specimen: Blood   Result Value Ref Range    TSH 2.940 0.270 - 4.200 uIU/mL   PSA    Specimen: Blood   Result Value Ref Range    PSA 0.545 0.000 - 4.000 ng/mL   Hemoglobin A1c    Specimen: Blood   Result Value Ref Range    Hemoglobin A1C 7.80 (H) 4.80 - 5.60 %   MicroAlbumin, Urine, Random - Urine, Clean Catch    Specimen: Urine, Clean Catch   Result Value Ref Range    Microalbumin, Urine 10.6 Not Estab. ug/mL   CBC and Differential    Specimen: Blood   Result Value Ref Range    WBC 7.08 3.40 - 10.80 10*3/mm3    RBC 4.54 4.14 - 5.80 10*6/mm3    Hemoglobin 13.9 13.0 - 17.7 g/dL    Hematocrit 39.6 37.5 - 51.0 %    MCV 87.2 79.0 - 97.0 fL    MCH 30.6 26.6 - 33.0 pg    MCHC 35.1 31.5 - 35.7 g/dL    RDW 11.7 (L) 12.3 - 15.4 %    Platelets 241 140 - 450 10*3/mm3    Neutrophil Rel % 47.1 42.7 - 76.0 %    Lymphocyte Rel % 42.5 19.6 - 45.3 %    Monocyte Rel % 6.8 5.0 - 12.0 %    Eosinophil Rel % 2.0 0.3 - 6.2 %    Basophil Rel % 1.3 0.0 - 1.5 %    Neutrophils Absolute 3.34 1.70 - 7.00 10*3/mm3    Lymphocytes Absolute 3.01 0.70 - 3.10 10*3/mm3    Monocytes Absolute 0.48 0.10 - 0.90 10*3/mm3    Eosinophils Absolute 0.14 0.00 - 0.40 10*3/mm3    Basophils Absolute 0.09 0.00 - 0.20 10*3/mm3    Immature Granulocyte Rel % 0.3 0.0 - 0.5 %    Immature Grans Absolute 0.02 0.00 - 0.05 10*3/mm3    nRBC 0.0 0.0 - 0.2 /100 WBC             The following portions of the patient's history were reviewed and updated as appropriate: allergies, current medications, past family history, past medical history, past social history, past surgical history, and problem list.    Review of Systems   Constitutional: Negative for activity change, chills, fatigue and fever.   Respiratory: Negative for cough.    Cardiovascular: Negative for chest pain.   Endocrine: Negative for polydipsia, polyphagia and polyuria.   Skin: Negative for pallor.   Neurological: Negative for dizziness, tremors, seizures, speech difficulty, weakness and headaches.    Psychiatric/Behavioral: Negative for confusion and dysphoric mood. The patient is not nervous/anxious.        Objective   Physical Exam  Constitutional:       General: He is not in acute distress.     Appearance: He is well-developed.   Cardiovascular:      Rate and Rhythm: Normal rate and regular rhythm.   Pulmonary:      Effort: Pulmonary effort is normal.      Breath sounds: Normal breath sounds.   Neurological:      Mental Status: He is alert and oriented to person, place, and time.   Psychiatric:         Behavior: Behavior normal.         Thought Content: Thought content normal.     Labs reviewed with pt today during visit. All questions answered.          Diagnoses and all orders for this visit:    1. Type 2 diabetes with nephropathy (HCC)  Comments:  Worsening; active management changes at today's visit (medication adjusted)  Orders:  -     glimepiride (Amaryl) 2 MG tablet; Take 1 tablet by mouth 2 (Two) Times a Day.  Dispense: 180 tablet; Refill: 1  -     Liraglutide (Victoza) 18 MG/3ML solution pen-injector injection; Inject 1.8 mg under the skin into the appropriate area as directed Daily.  Dispense: 9 mL; Refill: 1  -     dapagliflozin Propanediol (Farxiga) 10 MG tablet; Take 10 mg by mouth Daily.  Dispense: 90 tablet; Refill: 1  -     Basic Metabolic Panel; Future  -     Hemoglobin A1c; Future    2. Stage 3b chronic kidney disease (HCC)  -     CBC & Differential; Future    3. Hypercholesterolemia  -     ezetimibe (Zetia) 10 MG tablet; Take 1 tablet by mouth Daily.  Dispense: 90 tablet; Refill: 1  -     atorvastatin (Lipitor) 40 MG tablet; Take 1 tablet by mouth Daily.  Dispense: 90 tablet; Refill: 1    4. Benign essential hypertension  -     lisinopril (PRINIVIL,ZESTRIL) 5 MG tablet; Take 1 tablet by mouth Daily.  Dispense: 90 tablet; Refill: 1  -     Basic Metabolic Panel; Future  -     CBC & Differential; Future    Diet/exercise/weight management to treat the glucose discussed.  Pt to keep his  Nephrology appts.

## 2023-01-23 ENCOUNTER — OFFICE VISIT (OUTPATIENT)
Dept: FAMILY MEDICINE CLINIC | Facility: CLINIC | Age: 68
End: 2023-01-23
Payer: MEDICARE

## 2023-01-23 VITALS
HEIGHT: 72 IN | HEART RATE: 70 BPM | WEIGHT: 254 LBS | TEMPERATURE: 97.5 F | OXYGEN SATURATION: 98 % | DIASTOLIC BLOOD PRESSURE: 68 MMHG | BODY MASS INDEX: 34.4 KG/M2 | RESPIRATION RATE: 16 BRPM | SYSTOLIC BLOOD PRESSURE: 129 MMHG

## 2023-01-23 DIAGNOSIS — I10 BENIGN ESSENTIAL HYPERTENSION: ICD-10-CM

## 2023-01-23 DIAGNOSIS — N18.32 STAGE 3B CHRONIC KIDNEY DISEASE: Chronic | ICD-10-CM

## 2023-01-23 DIAGNOSIS — E11.21 TYPE 2 DIABETES WITH NEPHROPATHY: Chronic | ICD-10-CM

## 2023-01-23 DIAGNOSIS — E78.00 HYPERCHOLESTEROLEMIA: Chronic | ICD-10-CM

## 2023-01-23 PROCEDURE — 99214 OFFICE O/P EST MOD 30 MIN: CPT | Performed by: FAMILY MEDICINE

## 2023-01-23 PROCEDURE — 3051F HG A1C>EQUAL 7.0%<8.0%: CPT | Performed by: FAMILY MEDICINE

## 2023-01-23 RX ORDER — DAPAGLIFLOZIN 10 MG/1
10 TABLET, FILM COATED ORAL DAILY
Qty: 90 TABLET | Refills: 1 | Status: SHIPPED | OUTPATIENT
Start: 2023-01-23

## 2023-01-23 RX ORDER — EZETIMIBE 10 MG/1
10 TABLET ORAL DAILY
Qty: 90 TABLET | Refills: 1 | Status: SHIPPED | OUTPATIENT
Start: 2023-01-23

## 2023-01-23 RX ORDER — LISINOPRIL 5 MG/1
5 TABLET ORAL DAILY
Qty: 90 TABLET | Refills: 1 | Status: SHIPPED | OUTPATIENT
Start: 2023-01-23

## 2023-01-23 RX ORDER — ATORVASTATIN CALCIUM 40 MG/1
40 TABLET, FILM COATED ORAL DAILY
Qty: 90 TABLET | Refills: 1 | Status: SHIPPED | OUTPATIENT
Start: 2023-01-23

## 2023-01-23 RX ORDER — GLIMEPIRIDE 2 MG/1
2 TABLET ORAL
Qty: 180 TABLET | Refills: 1 | Status: SHIPPED | OUTPATIENT
Start: 2023-01-23

## 2023-01-23 RX ORDER — LIRAGLUTIDE 6 MG/ML
1.8 INJECTION SUBCUTANEOUS DAILY
Qty: 9 ML | Refills: 1 | Status: SHIPPED | OUTPATIENT
Start: 2023-01-23

## 2023-02-02 ENCOUNTER — E-VISIT (OUTPATIENT)
Dept: FAMILY MEDICINE CLINIC | Facility: TELEHEALTH | Age: 68
End: 2023-02-02
Payer: MEDICARE

## 2023-02-02 PROCEDURE — BRIGHTMDVISIT: Performed by: NURSE PRACTITIONER

## 2023-02-02 NOTE — EXTERNAL PATIENT INSTRUCTIONS
Diagnosis   Bacterial sinusitis   My name is Julianna Neves, and I'm a healthcare provider at [facility]. I reviewed your interview, and I see that you have bacterial sinusitis.   To prevent the spread of illness to others, I recommend that you stay home and away from other people as much as possible while you're sick. When you need to be around other people, consider wearing a face mask.   Medications   Your pharmacy   [facility] [id number] [id number] [phone]     Prescription   Benzonatate (200mg): Take 1 capsule by mouth 3 times a day as needed for cough. Do not chew or cut these capsules.   Amoxicillin-clavulanate (875mg/125mg): Take 1 tablet by mouth twice a day for 7 days for infection. This medication is an antibiotic. Take it exactly as directed. You must finish the entire course of medication, even if you feel better after the first few days of treatment.    Start taking the antibiotics I've prescribed right away. You need to finish the entire course of antibiotics, even if you start to feel better before the pills run out.   About your diagnosis   The sinuses are hollow spaces connected to the nasal passages. Sinusitis occurs when the sinuses swell and block the drainage of fluid and mucus from the nose, causing pain, pressure, and congestion. Fatigue, difficulty sleeping, or decreased appetite may accompany your symptoms.   More than 90% of sinus infections are caused by viruses. However, in certain cases, a sinus infection may be caused by bacteria. Bacterial sinus infections usually look like one of the following cases:    Severe sinus symptoms with a fever over 102F.    Sinus symptoms that have not improved at all after 10 days.    Cold symptoms that slowly improve but then worsen again after 5 or 6 days, usually with a high fever, headache, or nasal discharge.   What to expect   If you follow this treatment plan, you should start to feel better  within a few days.   When to seek care   Call us at [phone]   with any sudden or unexpected symptoms.    Symptoms that last longer than 10 to 14 days.    Symptoms that get better for a few days, and then suddenly get worse.    Fever that measures over 103F or continues for more than 3 days.    Any vision changes.    Your headache worsens.    Stiff neck.    Swelling of your forehead or eyes.    Coughing up red or bloody mucus.    Swallowing becomes extremely difficult or impossible.    More than 5 episodes of diarrhea in a day.    More than 5 episodes of vomiting in a day.    Severe shortness of breath.   You mentioned having chest pain. If you develop any of the following, call 911 or go immediately to your nearest emergency room:    A feeling of pressure on your chest    Pain that gets worse with physical activity    Pain that feels like it is radiating to the shoulder, neck, arm, or jaw    Your treatment plan isn't working. Your immune condition may mean that you need more care.   Other treatment    Rest! Your body needs rest to recover and fight infection.    Drink plenty of water to stay hydrated.    Use steam to soothe your sinuses: Breathe it in from a shower or a bowl of hot water. Placing a warm, moist washcloth over your nose and forehead may help relieve the sinus pain and pressure.    Try non-prescription saline nasal sprays to help your nasal symptoms. Try using a Neti Pot to flush out your stuffy nose and sinuses. Neti Pots are available at any drugstore without a prescription.    Avoid smoke and air pollution. Smoke can make infections worse.   Prevention    Avoid close contact with other people when you're sick.    Cover your mouth and nose when you cough or sneeze. Use a tissue or cough into your elbow. Make sure that used tissues go directly into the trash.    Avoid touching your eyes, nose, or mouth while you're sick.    Wash your hands often, especially after coughing, sneezing, or  blowing your nose. If soap and water are not available, use an alcohol-based hand .    If you or someone in your home or workplace is sick, disinfect commonly used items. This includes door handles, tables, computers, remotes, and pens.    Coronavirus (COVID-19) information   Common symptoms of COVID-19 include fever, cough, shortness of breath, fatigue, muscle or body aches, headaches, new loss of sense of taste or smell, sore throat, stuffy or runny nose, nausea or vomiting, and diarrhea. Most people who get COVID-19 have mild symptoms and can rest at home until they get better. Elderly people and those with chronic medical problems may be at risk for more serious complications.   FAQs about the COVID-19 vaccine   There are four authorized COVID-19 vaccines: Josr & Josr's Gaye Vaccine (J&J/Gaye), Moderna, Novavax, and Pfizer-Keko (Pfizer). The J&J/Gaye and Novavax vaccines are approved for use in people aged 18 and older. The Moderna and Pfizer vaccines are approved for those aged 6 months and older. All four are available at no cost. Even if you don't have health insurance, you can still get the COVID-19 vaccine for free.   Which vaccine is the best? Which vaccine should I get?   All four vaccines are highly effective. Even if you get COVID-19 after being vaccinated, all of the vaccines help prevent severe disease, hospitalization, and complications.   Most people should get whichever vaccine is first available to them. However, women younger than 50 years old should consider the rare risk of blood clots with low platelets after vaccination with the J&J/Gaye vaccine. This risk hasn't been seen with the other three vaccines.   Are the vaccines safe?   Yes. Hundreds of millions of people in the [place] have already safely received COVID-19 vaccines under the most intense safety monitoring in the history of the [place].   Do I need the vaccine if I've already had COVID?   Yes. Vaccination helps  protect you even if you've already had COVID.   If you had COVID-19 and had symptoms, wait to get vaccinated until you've recovered and completed your isolation period.   If you tested positive for COVID-19 but did not have symptoms, you can get vaccinated after 5 full days have passed since you had a positive test, as long as you don't develop symptoms.   How many doses of the vaccine do I need?   Visit www.cdc.gov/coronavirus/2019-ncov/vaccines/stay-up-to-date.html   to find out how to stay up to date with your COVID-19 vaccines.   I'm immunocompromised. How many doses of the vaccine do I need?   For information on how immunocompromised people can stay up to date with their COVID-19 vaccines, visit www.cdc.gov/coronavirus/2019-ncov/vaccines/recommendations/immuno.html  .   What are the common side effects of the vaccine?   A sore arm, tiredness, headache, and muscle pain may occur within two days of getting the vaccine and last a day or two. For the Moderna or Pfizer vaccines, side effects are more common after the second dose. People over the age of 55 are less likely to have side effects than younger people.   After I'm up to date on vaccines, can I still get or spread COVID?   Yes, you can still get COVID, but your disease should be milder. And your risk of serious illness, hospitalization, and complications will be much lower, especially if you're up to date. Unfortunately, you can still spread COVID if you've been vaccinated. That's why it's important to follow isolation guidelines if you get sick or test positive.   After I'm up to date on vaccines, can I go back to normal?   You should still wear a mask indoors in public if:    It's required by laws, rules, regulations, or local guidance.    You have a weakened immune system.    Your age puts you at increased risk of severe disease.    You have a medical condition that puts you at increased risk of severe disease.    Someone in your household has a weakened  immune system, is at increased risk for severe disease, or is unvaccinated.    You're in an area of high transmission.   Where can I get a COVID-19 vaccine?   Visit [facility]'s website for more information. To find a COVID-19 vaccination site near you, visit www.videoNEXT.gov/  , call [phone]  , or text your zip code to [id number] ([address]). Message and data rates may apply.   What about travel?   Travel increases your risk of exposure to COVID-19. For more information, see www.cdc.gov/coronavirus/2019-ncov/travelers/index.html  .   I've had close contact with someone who has COVID. Do I need to quarantine, and if so, for how long?   For the most current answer, including a calculator to determine whether you need to stay home and for how long, visit www.cdc.gov/coronavirus/2019-ncov/your-health/quarantine-isolation.html  .   I've tested positive for COVID. How long do I need to isolate?   For the latest recommendations, including a calculator to determine how long you need to stay home, visit www.cdc.gov/coronavirus/2019-ncov/your-health/quarantine-isolation.html  .   What if I develop symptoms that might be from COVID?   For the latest recommendations on what to do if you're sick, including when to seek emergency care, visit www.cdc.gov/coronavirus/2019-ncov/if-you-are-sick/steps-when-sick.html  .    Flu vaccine information   Who should get a flu vaccine?   Everyone 6 months of age and older should get a yearly flu vaccine.   When should I get vaccinated?   You should get a flu vaccine by the end of October. Once you're vaccinated, it takes about two weeks for antibodies to develop and protect you against the flu. That's why it's important to get vaccinated as soon as possible.   After October, is it too late to get vaccinated?   No. You should still get vaccinated. As long as the flu viruses are still in your community, flu vaccines will remain available, even into January of next year or later.   Why do I  need a flu vaccine EVERY year?   Flu viruses are constantly changing, so flu vaccines are usually updated from one season to the next. Your protection from the flu vaccine also lessens over time.   Is the flu vaccine safe?   Yes. Over the last 50 years, hundreds of millions of Americans have safely received the flu vaccines.   What are the side effects of flu vaccines?   You CANNOT get the flu from a flu vaccine. Common side effects of the flu shot include soreness, redness and/or swelling where the shot was given, low grade fever, and aches. Common side effects of the nasal spray flu vaccine for adults include runny nose, headaches, sore throat, and cough. For children, side effects include wheezing, vomiting, muscle aches, and fever.   Does the flu vaccine increase your risk of getting COVID-19?   No. There is no evidence that getting a flu vaccine increases your risk of getting COVID-19.   Is it safe to get the flu vaccine along with a COVID-19 vaccine?   Yes. It's safe to get the flu vaccine with a COVID-19 vaccine or booster.   Contact your healthcare provider TODAY for details on when and where to get your flu vaccine.   Your provider   Your diagnosis was provided by Julianna Neves, a member of your trusted care team at [facility].   If you have any questions, call us at [phone]  .

## 2023-02-02 NOTE — E-VISIT TREATED
Chief Complaint: Cold, flu, COVID, sinus, hay fever, or seasonal allergies   Patient introduction   Patient is 67-year-old male with cough, congestion, nasal discharge, itchy nose or sneezing, and headache that started 10 to 14 days ago. Regarding date of symptom onset, patient writes: 01/22/2023.   COVID-19 exposure, testing history, and vaccination status:    No known exposure to a person with a confirmed or suspected case of COVID-19.    No recent travel outside of their local community.    Patient did a self-test within the last 24 hours. Test result was negative.    Received 5 doses of the COVID-19 vaccine (Moderna, Moderna, Moderna, Moderna, Pfizer).    Received their most recent dose of the vaccine more than 14 days ago.   Risk factors for severe disease from COVID-19 infection:    Age 65 or older.    BMI >= 25.    Diabetes.    Chronic kidney disease.   Warning. The following may warrant further investigation:    Hypertension.    Chronic kidney disease; reduced dosing of certain medications may be required depending on patient's kidney function.   Patient did not request an excuse note.   General presentation   No improvement of symptoms. Symptoms came on gradually.   Fever:    No fever.   Sinus and nasal symptoms:    Nasal discharge.    Itchy nose or sneezing.    Yellow nasal drainage.    Nasal drainage is thick.    Postnasal drip.    Congestion with sinus pain or pressure on or around the forehead, eyes, nose, and cheeks.    Patient first noticed sinus pain 10 to 14 days ago.    Sinus pain is worse with Valsalva.    No history of unhealed nasal septal ulcer/nasal wound.    No history of antibiotic treatment for sinus infection in the last year.    No history of deviated septum or nasal polyps.   Throat symptoms:    No sore throat.   Head and body aches:    Headache described as moderate (4 to 6 on a scale of 1 to 10).    No sweats.    No chills.    No myalgia.    No fatigue.   Cough:    Cough is worse in  the morning and at night/while sleeping.    Cough is mildly productive of sputum.    Describes color of sputum as white/frothy.   Wheezing and shortness of breath:    No COPD diagnosis.    No asthma diagnosis.    No wheezing.    No shortness of breath.    No previous albuterol inhaler use for URIs, bronchitis, or pneumonia.    No previous steroid inhaler use for URIs, bronchitis, or pneumonia.   Chest pain:    Has chest pain, but only when coughing.    Chest pain is not the patient's chief complaint.    [place] Heart Score (MHS): 1, low risk of CAD. Assigning 1 point to each of 5 criteria (female >= 65 years old or male >= 55 years, known CAD, pain worse with exercise, pain not reproducible with palpation, and patient assumes pain is cardiogenic), the MHS is a validated clinical decision rule used to rule out coronary artery disease in primary care patients with chest pain.   Ear symptoms:    Current symptoms include fullness and crackling or popping in the ear(s).   Dizziness:    No dizziness.   Allergies:    No history of allergies.   Flu exposure:    No recent known exposure to a person with a confirmed flu diagnosis.    Received a flu vaccine more than 6 months ago.   Patient is taking over-the-counter medications for current symptoms, including acetaminophen and diphenhydramine.   Review of red flags/alarm symptoms:    No changes in alertness or awareness.    No symptoms suggesting intracranial hemorrhage.    No decreased urination.   Risk factors for antibiotic resistance:    Diabetes.    Antibiotic use for similar symptoms within the last 30 days.   Self-exam:    Height: 182 centimeters    Weight: 114.3 kilograms    No difficulty moving their chin toward their chest.    Swollen/painful neck lymph nodes.    Has taken antibiotics for similar symptoms within the past month.   Current medications   Currently taking Victoza 18 MG/3ML solution pen-injector injection, Accu-Chek Jordyn Plus test strip, Accu-Chek Jordyn  Plus w/Device kit, multivitamin with minerals tablet tablet, glimepiride 2 MG tablet, lisinopril 5 MG tablet, Insulin Pen Needle 32G X 4 MM misc, accu-chek soft touch lancets, atorvastatin 40 MG tablet, Farxiga 10 MG tablet, ezetimibe 10 MG tablet, and aspirin 81 MG EC tablet.   Medication allergies   None.   Medication contraindication review   Patient has history of hypertension and diabetes. Therefore, the following medication(s) will not be prescribed:    Metoclopramide.    Acetaminophen-diphenhydramine-phenylephrine.    Pseudoephedrine.    Aspirin-chlorpheniramine-phenylephrine.   No history of metoclopramide-associated dystonic reaction and tardive dyskinesia.   Because of a history of chronic kidney disease, amoxicillin-clavulanate will not be prescribed.   No known history of amoxicillin-clavulanate-associated cholestatic jaundice or hepatic impairment.   No known history of azithromycin-associated cholestatic jaundice or hepatic impairment.   Past medical history   Immune conditions: chronic kidney disease. Patient takes medications regularly for their condition(s). No history of cancer.   Social history   High-risk household contacts: Patient's household includes one or more members of a group with risk factors for influenza complications, including a person 65 years or older.   Never smoked tobacco.   Assessment   Bacterial sinusitis. Ruled out: Traumatic laryngitis.   This is the likely diagnosis based on patient's interview responses and symptoms, including:    Congestion or sinus pressure.    Thick nasal discharge.    Yellow or green mucus.    Duration of symptoms longer than 10 days.    Sinus pressure lasting longer than 10 days.    Symptoms have not improved.   Plan   Medications:    benzonatate 200 mg capsule RX 200mg 1 cap PO tid PRN 7d for cough. Do not chew or cut these capsules. Amount is 21 cap.    amoxicillin 875 mg-potassium clavulanate 125 mg tablet RX 875mg/125mg 1 tab PO bid 7d for  infection. This medication is an antibiotic. Take it exactly as directed. You must finish the entire course of medication, even if you feel better after the first few days of treatment. Amount is 14 tab.   The patient's prescriptions will be sent to:   [facility] [id number]   [address] [address]   Phone: [phone]     Fax: [phone]   Education:    Condition and causes    Prevention    Treatment and self-care    When to call provider   ----------   Electronically signed by BERNADETTE Macias on 2023-02-02 at 10:10AM   ----------   Patient Interview Transcript:   Please carefully consider each question and answer as best you can. This helps your provider give you the best care. Which of these symptoms are bothering you? Select all that apply.    Cough    Stuffed-up nose or sinuses    Runny nose    Itchy nose or sneezing    Headache   Not selected:    Shortness of breath    Fever    Itchy or watery eyes    Loss of smell or taste    Sore throat    Hoarse voice or loss of voice    Sweats    Chills    Muscle or body aches    Fatigue or tiredness    Nausea or vomiting    Diarrhea    I don't have any of these symptoms   Since your current symptoms started, have you had a viral test for COVID-19? - This includes home self-tests as well as nose swab or saliva tests done at a doctor's office, lab, or testing site. - It does NOT include antibody tests, which use a blood sample to test for past infection. Select one.    Yes   Not selected:    No   When was your most recent COVID-19 test? Select one.    Within the last 24 hours   Not selected:    Within the last week (specify date as MM/DD/YY)    7 to 14 days ago    15 to 30 days ago    More than 1 month ago   What type of COVID-19 test did you most recently have? Select one.    Viral self-test or home test   Not selected:    Viral test at a doctor's office, lab, or testing site   What was the result of your most recent COVID-19 test?  "Select one.    Negative   Not selected:    Positive   Have you gotten the COVID-19 vaccine? Select one.    Yes   Not selected:    No   How many total doses of the COVID-19 vaccine have you gotten? This includes boosters as well as additional doses for those who are immunocompromised. Select one.    5 doses   Not selected:    1 dose    2 doses    3 doses    4 doses   1st dose    Moderna   Not selected:    J&J/Gaye    Novavax    Pfizer   2nd dose    Moderna   Not selected:    J&J/Gaye    Novavax    Pfizer   3rd dose    Moderna   Not selected:    J&J/Gaye    Novavax    Pfizer   4th dose    Moderna   Not selected:    J&J/Gaye    Novavax    Pfizer   5th dose    Pfizer   Not selected:    J&J/Gaye    Moderna    Novavax   When did you get your most recent dose of the COVID-19 vaccine?    More than 14 days ago   Not selected:    Less than 48 hours (2 days) ago    48 to 72 hours (3 days) ago    3 to 5 days ago    5 to 7 days ago    7 to 14 days ago   In the last 14 days, have you traveled outside of your local community? This includes travel by car, RV, bus, train, or plane. Travel increases your chances of getting and spreading COVID-19. Select one.    No   Not selected:    Yes   In the last 14 days, have you had close contact with someone who has coronavirus (COVID-19)? \"Close contact\" means any of these: - Living in the same household as someone with COVID-19. - Caring for someone with COVID-19. - Being within 6 feet of someone with COVID-19 for a total of at least 15 minutes over a 24-hour period. For example, three 5-minute exposures for a total of 15 minutes. - Being in direct contact with respiratory droplets from someone with COVID-19 (being coughed on, kissing, sharing utensils). Select one.    No, not that I know of   Not selected:    Yes, a confirmed case    Yes, a suspected case   When did your current symptoms start? Select one.    10 to 14 days ago   Not selected:    Less than 48 hours ago    3 to " 5 days ago    6 to 9 days ago    2 to 3 weeks ago    3 to 4 weeks ago    More than a month ago   Do you know the exact date your symptoms started? If so, enter the date as MM/DD/YY. Select one.    Yes (specify): 01/22/2023   Not selected:    No   Did your symptoms come on suddenly or gradually? Select one.    Gradually   Not selected:    Suddenly    I'm not sure   Have your symptoms improved at all since they began? Select one.    No   Not selected:    Yes, but they haven't gone away completely    Yes, but then they came back worse than before    I'm not sure   You mentioned having a headache. On a scale of 1 to 10, how severe is your headache pain? Select one.    Moderate (4 to 6)   Not selected:    Mild (1 to 3)    Severe (7 to 9)    Unbearable (10)    The worst headache of my life (10+)   Do you cough so hard that it's made you gag or vomit? By gag, we mean has your coughing made you choke or dry heave? Select all that apply.    Yes, my coughing has made me gag   Not selected:    Yes, my coughing has made me vomit    No   When is your cough the worst? Select all that apply.    In the morning, or when I wake up    At nighttime, or while I'm sleeping   Not selected:    During the day    I haven't noticed a difference depending on time of day   Are you coughing up mucus or phlegm? Select one.    Yes, a little   Not selected:    No, my cough is dry    Yes, a lot   What color is most of the mucus or phlegm that you're coughing up? Select one.    White/frothy   Not selected:    Clear    Yellow    Green    Red or pink    I'm not sure   You mentioned having a stuffy nose or sinus congestion. Do you feel pain or pressure in your sinuses?    Yes   Not selected:    No   Where do you feel sinus pain or pressure?    In my forehead    Around my eyes    Behind my nose    In my cheeks   Not selected:    In my upper teeth or jaw    I'm not sure   When did you first notice your sinus pain or pressure? Select one.    10 to 14 days  "ago   Not selected:    Less than 5 days ago    5 to 9 days ago    2 to 4 weeks ago    1 month ago or longer   Does coughing, sneezing, or leaning forward make your sinuses feel worse? Select one.    Yes   Not selected:    No   What color is your nasal drainage? Select one.    Yellow   Not selected:    Clear    White    Green    My nose is stuffed but not draining or running    I'm not sure   Is your nasal drainage thick or thin? Select one.    Thick   Not selected:    Thin   Is there any drainage (mucus) going down the back of your throat? This kind of drainage is also called \"postnasal drip.\" Select one.    Yes   Not selected:    No, not that I know of   Since your symptoms started, have you felt dizzy? Select one.    No   Not selected:    Yes, but I can continue with my regular daily activities    Yes, and it makes it hard to stand, walk, or do daily activities   Do you have chest pain? You might also feel it as discomfort, aching, tightness, or squeezing in the chest. Select one.    Yes   Not selected:    No   Which of these is true of your chest pain? Select one.    My chest hurts only when I cough   Not selected:    My chest hurts even when I'm not coughing   Have you urinated at least 3 times in the last 24 hours? Select one.    Yes   Not selected:    No   Changes in alertness or awareness may mean you need emergency care. Since your symptoms started, have you had any of these? Select all that apply.    None of the above   Not selected:    Confusion    Slurred speech    Not knowing where you are or what day it is    Difficulty staying conscious    Fainting or passing out   Do your symptoms include a whistling sound, or wheezing, when you breathe? Select one.    No   Not selected:    Yes    I'm not sure   Do you have any of these symptoms in your ear(s)? Select all that apply.    Fullness    Crackling or popping   Not selected:    Pain    Pressure    Plugged or blocked sensation    None of the above   Can you " move your chin toward your chest?    Yes   Not selected:    No, my neck is too stiff   Are your glands/lymph nodes swollen, or does it hurt when you touch them?    Yes   Not selected:    No, not that I can tell   In the past week, has anyone around you (such as at school, work, or home) had a confirmed diagnosis of the flu? A confirmed diagnosis means that a nose swab was done to verify a flu infection. Select all that apply.    No, not that I know of   Not selected:    I live with someone who has the flu    I've been within touching distance of someone who has the flu    I've walked by, or sat about 3 feet away from, someone who has the flu    I've been in the same building as someone who has the flu   Have you ever been diagnosed with asthma? Select one.    No   Not selected:    Yes   Have you ever been prescribed albuterol to use for wheezing, cough, or shortness of breath caused by a cold, bronchitis, or pneumonia? Albuterol (ProAir, Proventil, Ventolin) is prescribed as an inhaler or a solution to be used with a nebulizer machine. Select one.    No, not that I know of   Not selected:    Yes   Have you ever been prescribed a steroid inhaler to use for wheezing, cough, or shortness of breath caused by a cold, bronchitis, or pneumonia? Some examples of steroid inhalers include Pulmicort, Flovent, Qvar, and Alvesco. Select one.    No, not that I know of   Not selected:    Yes   Have you ever been diagnosed with chronic obstructive pulmonary disease (COPD)? Select one.    No, not that I know of   Not selected:    Yes   In the past month, have you taken antibiotics for similar symptoms? Examples of antibiotics include amoxicillin, amoxicillin-clavulanate (Augmentin), penicillin, cefdinir (Omnicef), doxycycline, and clindamycin (Cleocin). Select one.    Yes   Not selected:    No   In the last year, how many times were you treated with antibiotics for a sinus infection? Select one.    None   Not selected:    1 to 3  times    4 or more times   Have you been diagnosed with a deviated septum or nasal polyps? The nose is divided into two nostrils by the septum. A crooked septum is called a deviated septum. Nasal polyps are growths inside the nose or sinuses. Select one.    No, not that I know of   Not selected:    Yes, but I had surgery to treat them    Yes, I have a deviated septum    Yes, I have nasal polyps    Yes, I have a deviated septum and nasal polyps   Do you have a sore inside your nose that won't heal? Select one.    No, not that I know of   Not selected:    Yes   Do you have allergies (pollen, dust mites, mold, animal dander)? Select one.    No, not that I know of   Not selected:    Yes   Have you had a flu shot this season? Select one.    Yes, more than 6 months ago   Not selected:    Yes, less than 2 weeks ago    Yes, 2 to 4 weeks ago    Yes, 1 to 3 months ago    Yes, 3 to 6 months ago    No   The flu and COVID-19 can be more serious for people with certain conditions or characteristics. These questions help us figure out if you or anyone you live with is at higher risk for complications from these infections. Do either of these statements apply to you? Select all that apply.    None of the above   Not selected:    I'm  or Native [place]    I'm a healthcare worker   Do you smoke tobacco? Select one.    No   Not selected:    Yes, every day    Yes, some days    No, I quit   Do you have any of these conditions? Select all that apply.    None of the above   Not selected:    Chronic lung disease, such as cystic fibrosis or interstitial fibrosis    Heart disease, such as congenital heart disease, congestive heart failure, or coronary artery disease    Disorder of the brain, spinal cord, or nerves and muscles, such as dementia, cerebral palsy, epilepsy, muscular dystrophy, or developmental delay    Metabolic disorder or mitochondrial disease    Cerebrovascular disease, such as stroke or another condition  affecting the blood vessels or blood supply to the brain    Down syndrome    Mood disorder, including depression or schizophrenia spectrum disorders    Substance use disorder, such as alcohol, opioid, or cocaine use disorder    Tuberculosis   Do you live in a group care setting? Examples include: - Nursing home - Residential care - Psychiatric treatment facility - Group home - Dormitory - Board and care home - Homeless shelter - Foster care setting Select one.    No   Not selected:    Yes   Are you a healthcare worker? Select one.    No   Not selected:    Yes   People with a very high body mass index (BMI) are at higher risk for developing complications from the flu and severe illness from COVID-19. To determine your BMI, we need to know your weight and height. Please enter your weight (in pounds).    Weight   Please enter your height.    Height   Do you have any of these conditions that can affect the immune system? Scroll to see all options. Select all that apply.    Chronic kidney disease   Not selected:    History of bone marrow transplant    Chronic liver disease (including cirrhosis)    HIV/AIDS    Inflammatory bowel disease (Crohn's disease or ulcerative colitis)    Lupus    Moderate to severe plaque psoriasis    Multiple sclerosis    Rheumatoid arthritis    Sickle cell anemia    Alpha or beta thalassemia    History of solid organ transplant (kidney, liver, or heart)    History of spleen removal    An autoimmune disorder not listed here    A condition requiring treatment with long-term use of oral steroids (such as prednisone, prednisolone, or dexamethasone)    None of these   Do you take medications for your condition? This includes oral and injectable medications that are taken daily, weekly, or monthly. Select one.    Yes, regularly   Not selected:    Yes, for flare-ups only    No   Have you ever been diagnosed with cancer? Select one.    No   Not selected:    Yes, I have cancer now    Yes, but I'm in  remission   Do any of these apply to you? Select all that apply.    I have diabetes   Not selected:    I've been hospitalized within the last 5 days    I'm in close contact with a child in     None of the above   Do any of these apply to the people who live with you? Select all that apply.    An adult 65 or older   Not selected:    A child under the age of 5    A person who is pregnant    A person who has given birth, had a miscarriage, had a pregnancy loss, or had an  in the last 2 weeks    An  or Native [place]    None of the above   Does any member of your household have any of these medical conditions? Select all that apply.    None of the above   Not selected:    Asthma    Disorders of the brain, spinal cord, or nerves and muscles, such as dementia, cerebral palsy, epilepsy, muscular dystrophy, or developmental delay    Chronic lung disease, such as COPD or cystic fibrosis    Heart disease, such as congenital heart disease, congestive heart failure, or coronary artery disease    Cerebrovascular disease, such as stroke or another condition affecting the blood vessels or blood supply to the brain    Blood disorders, such as sickle cell disease    Diabetes    Metabolic disorders such as inherited metabolic disorders or mitochondrial disease    Kidney disorders    Liver disorders    Weakened immune system due to illness or medications such as chemotherapy or steroids    Children under the age of 19 who are on long-term aspirin therapy    Extreme obesity (BMI > 40)   Do you have any of these conditions? Scroll to see all options. Select all that apply.    High blood pressure   Not selected:    Aspirin triad (also known as Samter's triad or ASA triad)    Asthma or hives from taking aspirin or other NSAIDs, such as ibuprofen or naproxen    Blockage or narrowing of the blood vessels of the heart    Blood clotting disorder    Blood dyscrasia, such anemia, leukemia, lymphoma, or myeloma     Bone marrow depression    Catecholamine-releasing paraganglioma    Congenital long QT syndrome    Depression    Difficulty urinating or completely emptying your bladder    Uncorrected electrolyte abnormalities    Fungal infection    Gastrointestinal (GI) bleeding    Gastrointestinal (GI) obstruction    G6PD deficiency    Recent heart attack    Irregular heartbeat or heart rhythm    Mononucleosis (mono)    Myasthenia gravis    Parkinson's disease    Pheochromocytoma    Reye syndrome    Seizure disorder    Thyroid disease    Ulcerative colitis    None of the above   Have you ever had either of these conditions? Select all that apply.    No   Not selected:    Metoclopramide-associated dystonic reaction    Tardive dyskinesia   Just a few more questions about medications, and then you're finished. Have you used any non-prescription medications or nasal sprays for your current symptoms? Examples include saline sprays, decongestants, NyQuil, and Tylenol. Select one.    Yes   Not selected:    No   Which of these non-prescription medications have you tried? Scroll to see all options. Select all that apply.    Acetaminophen (Tylenol)    Diphenhydramine (Benadryl)   Not selected:    Budesonide (Rhinocort)    Cetirizine (Zyrtec)    Chlorpheniramine (Aller-chlor, Chlor-Trimeton)    Cromolyn (NasalCrom)    Dextromethorphan (Delsym, Robitussin, Vicks DayQuil Cough)    Fexofenadine (Allegra)    Fluticasone (Flonase)    Guaifenesin (Mucinex)    Guaifenesin/dextromethorphan (Delsym DM, Mucinex DM, Robitussin DM)    Ibuprofen (Advil, Motrin, Midol)    Ketotifen (Alaway, Zaditor)    Loratadine (Alavert, Claritin)    Naphazoline-pheniramine (Naphcon-A, Opcon-A, Visine-A)    Omeprazole (Prilosec)    Oxymetazoline (Afrin)    Phenylephrine (Sudafed PE)    Triamcinolone (Nasacort)    None of the above   Have you taken any monoamine oxidase inhibitor (MAOI) medications in the last 14 days? Examples include rasagiline (Azilect), selegiline  (Eldepryl, Zelapar), isocarboxazid (Marplan), phenelzine (Nardil), and tranylcypromine (Parnate). Select one.    No, not that I know of   Not selected:    Yes   Do you take Kynmobi or Apokyn (apomorphine)? Select one.    No   Not selected:    Yes   Are you still taking these medications listed in your medical record? If you're not taking any of these, click Next. Select all that apply.    Victoza 18 MG/3ML solution pen-injector injection    Accu-Chek Jordyn Plus test strip    Accu-Chek Jordyn Plus w/Device kit    multivitamin with minerals tablet tablet    glimepiride 2 MG tablet    lisinopril 5 MG tablet    Insulin Pen Needle 32G X 4 MM misc    accu-chek soft touch lancets    atorvastatin 40 MG tablet    Farxiga 10 MG tablet    ezetimibe 10 MG tablet    aspirin 81 MG EC tablet   Are you taking any other medications, vitamins, or supplements? Select one.    No   Not selected:    Yes   Have you ever had an allergic or bad reaction to any medication? Select one.    No   Not selected:    Yes   Are you allergic to milk or to the proteins found in milk (for example, whey or casein)? A milk allergy is different from lactose intolerance. Select one.    No, not that I know of   Not selected:    Yes   Have you ever had jaundice or liver problems as a result of taking amoxicillin-clavulanate (Augmentin)? Jaundice is a condition in which the skin and the whites of the eyes turn yellow. Select all that apply.    No, not that I know of   Not selected:    Yes, jaundice    Yes, liver problems   Have you ever had jaundice or liver problems as a result of taking azithromycin (Zithromax, Zmax)? Jaundice is a condition in which the skin and the whites of the eyes turn yellow. Select all that apply.    No, not that I know of   Not selected:    Yes, jaundice    Yes, liver problems   Do you need a doctor's note? A doctor's note confirms that you received care today and states when you can return to school or work. It does not contain  information about your diagnosis or treatment plan. Your provider will make the final decision on whether to give you a doctor's note and for how long. Doctor's notes CANNOT be backdated. We can't provide medical leave paperwork through this type of visit. If more paperwork is needed to request time off, contact your primary care provider. Select one.    No   Not selected:    Today only (1 day)    Today and tomorrow (2 days)    3 days    5 days    7 days    10 days    14 days   Is there anything else you'd like to tell us about your symptoms?   The patient did not enter any additional information.   ----------   Medical history   Medical history data does not currently exist for this patient.

## 2023-02-09 DIAGNOSIS — E11.21 TYPE 2 DIABETES WITH NEPHROPATHY: Chronic | ICD-10-CM

## 2023-02-09 RX ORDER — LIRAGLUTIDE 6 MG/ML
1.8 INJECTION SUBCUTANEOUS DAILY
Qty: 9 ML | Refills: 1 | OUTPATIENT
Start: 2023-02-09

## 2023-02-09 NOTE — TELEPHONE ENCOUNTER
Caller: Junior Pollard Jr.    Relationship: Self    Best call back number: [phone]    Requested Prescriptions:   Requested Prescriptions     Pending Prescriptions Disp Refills   • Liraglutide (Victoza) 18 MG/3ML solution pen-injector injection 9 mL 1     Sig: Inject 1.8 mg under the skin into the appropriate area as directed Daily.        Pharmacy where request should be sent: [facility] [id number] [address] AT [facility] [phone] [facility] [phone] FX     Additional details provided by patient: PATIENT STATES THAT THE PHARMACY HAS A ONE MONTH SUPPLY (ONE BOX) READY FOR . PATIENT STATES THAT THIS MEDICATION IS USUALLY A 90 DAY SUPPLY (3 BOXES) PATIENT DID NOT PICK THE PRESCRIPTION UP. PLEASE SEND A NEW PRESCRIPTION FOR QUANTITY OF 90 DAYS WITH ONE ADDITIONAL REFILL    Does the patient have less than a 3 day supply:  [] Yes  [x] No    Would you like a call back once the refill request has been completed: [x] Yes [] No    PLEASE CALL ONCE PRESCRIPTION HAS BEEN SENT.      Dilcia Hdz Rep   02/09/23 10:19 EST

## 2023-04-26 DIAGNOSIS — E11.21 TYPE 2 DIABETES WITH NEPHROPATHY: Chronic | ICD-10-CM

## 2023-04-26 RX ORDER — BLOOD SUGAR DIAGNOSTIC
STRIP MISCELLANEOUS
Qty: 100 EACH | Refills: 2 | Status: SHIPPED | OUTPATIENT
Start: 2023-04-26

## 2023-07-21 NOTE — PROGRESS NOTES
The ABCs of the Annual Wellness Visit  Subsequent Medicare Wellness Visit    Subjective    Junior Pollard Jr. is a 67 y.o. male who presents for a Subsequent Medicare Wellness Visit.    The following portions of the patient's history were reviewed and   updated as appropriate: allergies, current medications, past family history, past medical history, past social history, past surgical history, and problem list.    Compared to one year ago, the patient feels his physical   health is the same.    Compared to one year ago, the patient feels his mental   health is the same.    Recent Hospitalizations:  He was not admitted to the hospital during the last year.       Current Medical Providers:  Patient Care Team:  Boogie Interiano MD as PCP - General (Family Medicine)  Gerald Bravo MD as Consulting Physician (Nephrology)    Outpatient Medications Prior to Visit   Medication Sig Dispense Refill    Accu-Chek Jordyn Plus test strip USE TO TEST DAILY AS DIRECTED 100 each 2    aspirin (aspirin) 81 MG EC tablet Take 81 mg by mouth Daily.      Blood Glucose Monitoring Suppl (Accu-Chek Jordyn Plus) w/Device kit Use as directed to check BS daily E11.9 1 kit 0    Insulin Pen Needle 32G X 4 MM misc Use  each 3    Lancets (accu-chek soft touch) lancets Use as directed to test once daily E11.9 100 each 3    multivitamin with minerals (MULTIVITAMIN ADULTS 50+ PO) Take 1 tablet by mouth Daily.      atorvastatin (Lipitor) 40 MG tablet Take 1 tablet by mouth Daily. 90 tablet 1    dapagliflozin Propanediol (Farxiga) 10 MG tablet Take 10 mg by mouth Daily. 90 tablet 1    ezetimibe (Zetia) 10 MG tablet Take 1 tablet by mouth Daily. 90 tablet 1    glimepiride (Amaryl) 2 MG tablet Take 1 tablet by mouth 2 (Two) Times a Day. 180 tablet 1    Liraglutide (Victoza) 18 MG/3ML solution pen-injector injection Inject 1.8 mg under the skin into the appropriate area as directed Daily. 9 mL 1    lisinopril (PRINIVIL,ZESTRIL) 5 MG tablet  "Take 1 tablet by mouth Daily. 90 tablet 1     No facility-administered medications prior to visit.       No opioid medication identified on active medication list. I have reviewed chart for other potential  high risk medication/s and harmful drug interactions in the elderly.        Aspirin is on active medication list. Aspirin use is indicated based on review of current medical condition/s. Pros and cons of this therapy have been discussed today. Benefits of this medication outweigh potential harm.  Patient has been encouraged to continue taking this medication.  .      Patient Active Problem List   Diagnosis    Abnormal cardiovascular stress test    Multiple actinic keratoses    Chronic coronary artery disease    Benign essential hypertension    Stage 3b chronic kidney disease    Diabetes mellitus with renal complications    Hypercholesterolemia    Elevated creatine kinase level    Onychomycosis of toenail    Osteoarthritis    Proteinuria    Type 2 diabetes with nephropathy    Acute diverticulitis    Diarrhea    Fever    Lower abdominal pain    Uncontrolled type 2 diabetes mellitus with hyperglycemia    CORY (acute kidney injury)    Clostridium difficile colitis    History of diverticulitis    Cholelithiases    Sepsis without acute organ dysfunction (present on admit)    Primary osteoarthritis of both knees     Advance Care Planning   Advance Care Planning     Advance Directive is not on file.  ACP discussion was held with the patient during this visit. Patient does not have an advance directive, declines further assistance.     Objective    Vitals:    07/24/23 0743   BP: 135/60   Pulse: 68   Resp: 16   Temp: 97.6 °F (36.4 °C)   TempSrc: Oral   SpO2: 99%   Weight: 115 kg (253 lb)   Height: 182.9 cm (72\")   PainSc: 0-No pain     Estimated body mass index is 34.31 kg/m² as calculated from the following:    Height as of this encounter: 182.9 cm (72\").    Weight as of this encounter: 115 kg (253 lb).    BMI is >= 30 and " <35. (Class 1 Obesity). The following options were offered after discussion;: exercise counseling/recommendations and nutrition counseling/recommendations      Does the patient have evidence of cognitive impairment? No    Lab Results   Component Value Date    HGBA1C 7.30 (H) 2023        HEALTH RISK ASSESSMENT    Smoking Status:  Social History     Tobacco Use   Smoking Status Never   Smokeless Tobacco Never     Alcohol Consumption:  Social History     Substance and Sexual Activity   Alcohol Use No     Fall Risk Screen:    ERIN Fall Risk Assessment has not been completed.    Depression Screenin/24/2023     7:45 AM   PHQ-2/PHQ-9 Depression Screening   Little Interest or Pleasure in Doing Things 0-->not at all   Feeling Down, Depressed or Hopeless 0-->not at all   PHQ-9: Brief Depression Severity Measure Score 0       Health Habits and Functional and Cognitive Screenin/24/2023     7:00 AM   Functional & Cognitive Status   Do you have difficulty preparing food and eating? No   Do you have difficulty bathing yourself, getting dressed or grooming yourself? No   Do you have difficulty using the toilet? No   Do you have difficulty moving around from place to place? No   Do you have trouble with steps or getting out of a bed or a chair? No   Current Diet Well Balanced Diet   Dental Exam Not up to date   Eye Exam Not up to date   Exercise (times per week) 3 times per week   Current Exercises Include Walking   Do you need help using the phone?  No   Are you deaf or do you have serious difficulty hearing?  Yes   Do you need help to go to places out of walking distance? No   Do you need help shopping? No   Do you need help preparing meals?  No   Do you need help with housework?  No   Do you need help with laundry? No   Do you need help taking your medications? No   Do you need help managing money? No   Do you ever drive or ride in a car without wearing a seat belt? No   Have you felt unusual stress,  anger or loneliness in the last month? No   Who do you live with? Spouse   If you need help, do you have trouble finding someone available to you? Yes   Have you been bothered in the last four weeks by sexual problems? No   Do you have difficulty concentrating, remembering or making decisions? No       Age-appropriate Screening Schedule:  Refer to the list below for future screening recommendations based on patient's age, sex and/or medical conditions. Orders for these recommended tests are listed in the plan section. The patient has been provided with a written plan.    Health Maintenance   Topic Date Due    Pneumococcal Vaccine 65+ (1 - PCV) Never done    DIABETIC FOOT EXAM  01/15/2021    COVID-19 Vaccine (6 - Moderna series) 09/02/2023 (Originally 2/27/2023)    ZOSTER VACCINE (2 of 2) 07/24/2024 (Originally 2/26/2014)    DIABETIC EYE EXAM  08/01/2023    INFLUENZA VACCINE  10/01/2023    PROSTATE CANCER SCREENING  01/16/2024    LIPID PANEL  01/16/2024    URINE MICROALBUMIN  01/16/2024    HEMOGLOBIN A1C  01/17/2024    ANNUAL WELLNESS VISIT  07/24/2024    TDAP/TD VACCINES (4 - Td or Tdap) 07/30/2028    HEPATITIS C SCREENING  Discontinued    COLORECTAL CANCER SCREENING  Discontinued                  CMS Preventative Services Quick Reference  Risk Factors Identified During Encounter  None Identified  The above risks/problems have been discussed with the patient.  Pertinent information has been shared with the patient in the After Visit Summary.  An After Visit Summary and PPPS were made available to the patient.    Follow Up:   Next Medicare Wellness visit to be scheduled in 1 year.       Additional E&M Note during same encounter follows:  Patient has multiple medical problems which are significant and separately identifiable that require additional work above and beyond the Medicare Wellness Visit.      Chief Complaint  Diabetes (MED REFILL / LAB RESULTS ), Hypertension, Hyperlipidemia, Arthritis (/), and medicare  "wellness  (Due )    Subjective          Junior oPllard Jr. is also being seen today for Medication Management.    Pt doing well on the medication(s) w/o SEs, and is due refill today.      Review of Systems   Constitutional:  Negative for chills and fever.   HENT:  Negative for congestion, ear pain and sinus pressure.    Eyes:  Negative for pain and visual disturbance.   Respiratory:  Negative for cough and shortness of breath.    Cardiovascular:  Negative for chest pain.   Gastrointestinal:  Negative for abdominal pain.   Genitourinary:  Negative for difficulty urinating and dysuria.   Musculoskeletal:  Positive for arthritis.   Skin: Negative.    Neurological: Negative.    Psychiatric/Behavioral:  Negative for dysphoric mood.      Objective   Vital Signs:  /60   Pulse 68   Temp 97.6 °F (36.4 °C) (Oral)   Resp 16   Ht 182.9 cm (72\")   Wt 115 kg (253 lb)   SpO2 99%   BMI 34.31 kg/m²     Physical Exam  Constitutional:       General: He is not in acute distress.     Appearance: He is well-developed.   Cardiovascular:      Rate and Rhythm: Normal rate and regular rhythm.   Pulmonary:      Effort: Pulmonary effort is normal.      Breath sounds: Normal breath sounds.   Neurological:      Mental Status: He is alert and oriented to person, place, and time.   Psychiatric:         Behavior: Behavior normal.         Thought Content: Thought content normal.    Labs reviewed with pt today during visit. All questions answered.  Pt sees nephrology and is UTD.    The following data was reviewed by: Boogie Interiano MD on 07/24/2023:  Common labs          1/16/2023    08:19 7/17/2023    08:28   Common Labs   Glucose 110  101    BUN 41  35    Creatinine 1.73  1.92    Sodium 141  140    Potassium 4.4  4.6    Chloride 104  106    Calcium 9.6  10.0    Total Protein 7.0     Albumin 4.2     Total Bilirubin 0.6     Alkaline Phosphatase 77     AST (SGOT) 18     ALT (SGPT) 22     WBC 7.08  7.27    Hemoglobin 13.9  14.3  "   Hematocrit 39.6  41.6    Platelets 241  237    Total Cholesterol 118     Triglycerides 91     HDL Cholesterol 33     LDL Cholesterol  67     Hemoglobin A1C 7.80  7.30    Microalbumin, Urine 10.6     PSA 0.545                  Assessment and Plan   Diagnoses and all orders for this visit:    1. Medicare annual wellness visit, subsequent (Primary)    2. Type 2 diabetes with nephropathy  Comments:  Improving  Orders:  -     glimepiride (Amaryl) 2 MG tablet; Take 1 tablet by mouth 2 (Two) Times a Day.  Dispense: 180 tablet; Refill: 1  -     dapagliflozin Propanediol (Farxiga) 10 MG tablet; Take 10 mg by mouth Daily.  Dispense: 90 tablet; Refill: 1  -     Liraglutide (Victoza) 18 MG/3ML solution pen-injector injection; Inject 1.8 mg under the skin into the appropriate area as directed Daily.  Dispense: 9 mL; Refill: 1  -     Comprehensive metabolic panel; Future  -     Lipid panel; Future  -     Hemoglobin A1c; Future  -     MicroAlbumin, Urine, Random - Urine, Clean Catch; Future    3. Hypercholesterolemia  -     ezetimibe (Zetia) 10 MG tablet; Take 1 tablet by mouth Daily.  Dispense: 90 tablet; Refill: 1  -     atorvastatin (Lipitor) 40 MG tablet; Take 1 tablet by mouth Daily.  Dispense: 90 tablet; Refill: 1  -     Lipid panel; Future    4. Benign essential hypertension  -     lisinopril (PRINIVIL,ZESTRIL) 5 MG tablet; Take 1 tablet by mouth Daily.  Dispense: 90 tablet; Refill: 1  -     Comprehensive metabolic panel; Future  -     Lipid panel; Future  -     CBC and Differential; Future  -     TSH; Future    5. Special screening for malignant neoplasm of prostate  -     PSA; Future           I spent 15 minutes caring for Junior on this date of service. This time includes time spent by me in the following activities:preparing for the visit, reviewing tests, performing a medically appropriate examination and/or evaluation , ordering medications, tests, or procedures, and documenting information in the medical  record  Follow Up   Return in about 6 months (around 1/24/2024) for Recheck.  Patient was given instructions and counseling regarding his condition or for health maintenance advice. Please see specific information pulled into the AVS if appropriate.

## 2023-07-24 ENCOUNTER — OFFICE VISIT (OUTPATIENT)
Dept: FAMILY MEDICINE CLINIC | Facility: CLINIC | Age: 68
End: 2023-07-24
Payer: MEDICARE

## 2023-07-24 VITALS
WEIGHT: 253 LBS | TEMPERATURE: 97.6 F | HEIGHT: 72 IN | SYSTOLIC BLOOD PRESSURE: 135 MMHG | BODY MASS INDEX: 34.27 KG/M2 | RESPIRATION RATE: 16 BRPM | DIASTOLIC BLOOD PRESSURE: 60 MMHG | OXYGEN SATURATION: 99 % | HEART RATE: 68 BPM

## 2023-07-24 DIAGNOSIS — Z12.5 SPECIAL SCREENING FOR MALIGNANT NEOPLASM OF PROSTATE: ICD-10-CM

## 2023-07-24 DIAGNOSIS — E78.00 HYPERCHOLESTEROLEMIA: Chronic | ICD-10-CM

## 2023-07-24 DIAGNOSIS — I10 BENIGN ESSENTIAL HYPERTENSION: Chronic | ICD-10-CM

## 2023-07-24 DIAGNOSIS — Z00.00 MEDICARE ANNUAL WELLNESS VISIT, SUBSEQUENT: Primary | ICD-10-CM

## 2023-07-24 DIAGNOSIS — E11.21 TYPE 2 DIABETES WITH NEPHROPATHY: Chronic | ICD-10-CM

## 2023-07-24 PROCEDURE — 1170F FXNL STATUS ASSESSED: CPT | Performed by: FAMILY MEDICINE

## 2023-07-24 PROCEDURE — 3051F HG A1C>EQUAL 7.0%<8.0%: CPT | Performed by: FAMILY MEDICINE

## 2023-07-24 PROCEDURE — 3075F SYST BP GE 130 - 139MM HG: CPT | Performed by: FAMILY MEDICINE

## 2023-07-24 PROCEDURE — 99214 OFFICE O/P EST MOD 30 MIN: CPT | Performed by: FAMILY MEDICINE

## 2023-07-24 PROCEDURE — 1126F AMNT PAIN NOTED NONE PRSNT: CPT | Performed by: FAMILY MEDICINE

## 2023-07-24 PROCEDURE — G0439 PPPS, SUBSEQ VISIT: HCPCS | Performed by: FAMILY MEDICINE

## 2023-07-24 PROCEDURE — 1160F RVW MEDS BY RX/DR IN RCRD: CPT | Performed by: FAMILY MEDICINE

## 2023-07-24 PROCEDURE — 3078F DIAST BP <80 MM HG: CPT | Performed by: FAMILY MEDICINE

## 2023-07-24 PROCEDURE — 96160 PT-FOCUSED HLTH RISK ASSMT: CPT | Performed by: FAMILY MEDICINE

## 2023-07-24 RX ORDER — LIRAGLUTIDE 6 MG/ML
1.8 INJECTION SUBCUTANEOUS DAILY
Qty: 9 ML | Refills: 1 | Status: SHIPPED | OUTPATIENT
Start: 2023-07-24

## 2023-07-24 RX ORDER — LISINOPRIL 5 MG/1
5 TABLET ORAL DAILY
Qty: 90 TABLET | Refills: 1 | Status: SHIPPED | OUTPATIENT
Start: 2023-07-24

## 2023-07-24 RX ORDER — EZETIMIBE 10 MG/1
10 TABLET ORAL DAILY
Qty: 90 TABLET | Refills: 1 | Status: SHIPPED | OUTPATIENT
Start: 2023-07-24

## 2023-07-24 RX ORDER — ATORVASTATIN CALCIUM 40 MG/1
40 TABLET, FILM COATED ORAL DAILY
Qty: 90 TABLET | Refills: 1 | Status: SHIPPED | OUTPATIENT
Start: 2023-07-24

## 2023-07-24 RX ORDER — GLIMEPIRIDE 2 MG/1
2 TABLET ORAL
Qty: 180 TABLET | Refills: 1 | Status: SHIPPED | OUTPATIENT
Start: 2023-07-24

## 2023-07-24 RX ORDER — DAPAGLIFLOZIN 10 MG/1
10 TABLET, FILM COATED ORAL DAILY
Qty: 90 TABLET | Refills: 1 | Status: SHIPPED | OUTPATIENT
Start: 2023-07-24

## 2023-07-24 RX ORDER — BLOOD SUGAR DIAGNOSTIC
1 STRIP MISCELLANEOUS DAILY
Qty: 100 EACH | Refills: 2 | Status: SHIPPED | OUTPATIENT
Start: 2023-07-24

## 2023-07-24 NOTE — PATIENT INSTRUCTIONS
Medicare Wellness  Personal Prevention Plan of Service     Date of Office Visit:    Encounter Provider:  Boogie Interiano MD  Place of Service:  [facility]  Patient Name: Junior Pollard Jr.  :  1955    As part of the Medicare Wellness portion of your visit today, we are providing you with this personalized preventive plan of services (PPPS). This plan is based upon recommendations of the [place] Preventive Services Task Force (USPSTF) and the Advisory Committee on Immunization Practices (ACIP).    This lists the preventive care services that should be considered, and provides dates of when you are due. Items listed as completed are up-to-date and do not require any further intervention.    Health Maintenance   Topic Date Due    Pneumococcal Vaccine 65+ (1 - PCV) Never done    DIABETIC FOOT EXAM  01/15/2021    COVID-19 Vaccine (6 - Moderna series) 2023 (Originally 2023)    ZOSTER VACCINE (2 of 2) 2024 (Originally 2014)    DIABETIC EYE EXAM  2023    INFLUENZA VACCINE  10/01/2023    PROSTATE CANCER SCREENING  2024    LIPID PANEL  2024    URINE MICROALBUMIN  2024    HEMOGLOBIN A1C  2024    ANNUAL WELLNESS VISIT  2024    TDAP/TD VACCINES (4 - Td or Tdap) 2028    HEPATITIS C SCREENING  Discontinued    COLORECTAL CANCER SCREENING  Discontinued       No orders of the defined types were placed in this encounter.      Return in about 6 months (around 2024) for Recheck.

## 2023-07-24 NOTE — TELEPHONE ENCOUNTER
"Caller: LatriceJunior shearer Jr. \"Gumaro\"    Relationship: Self    Best call back number: [phone]     Requested Prescriptions:   Requested Prescriptions     Pending Prescriptions Disp Refills    glucose blood (Accu-Chek Jordyn Plus) test strip 100 each 2     Si each by Other route Daily. use to test daily as directed    Insulin Pen Needle 32G X 4 MM misc 100 each 3     Sig: Use QD        Pharmacy where request should be sent: [facility] [id number] [address] AT [facility] [phone] [facility] [phone] FX     Last office visit with prescribing clinician: 2023   Last telemedicine visit with prescribing clinician: Visit date not found   Next office visit with prescribing clinician: 2024     Additional details provided by patient: ALSO SEND OVER Lancets (accu-chek soft touch) lancets . SEND ON PROFILE. DO NOT FILL YET.    Does the patient have less than a 3 day supply:  [] Yes  [x] No    Would you like a call back once the refill request has been completed: [] Yes [] No    If the office needs to give you a call back, can they leave a voicemail: [] Yes [] No    Dilcia To   23 11:51 EDT         "

## 2023-07-31 DIAGNOSIS — E11.21 TYPE 2 DIABETES WITH NEPHROPATHY: Chronic | ICD-10-CM

## 2023-07-31 RX ORDER — LANCETS
EACH MISCELLANEOUS
Qty: 100 EACH | Refills: 3 | Status: SHIPPED | OUTPATIENT
Start: 2023-07-31

## 2023-08-02 ENCOUNTER — TELEPHONE (OUTPATIENT)
Dept: FAMILY MEDICINE CLINIC | Facility: CLINIC | Age: 68
End: 2023-08-02

## 2023-08-02 NOTE — TELEPHONE ENCOUNTER
This pt stop by regarding his  medication Liraglutide (Victoza) 18 MG/3ML solution pen-injector injection he stating the prescription is written wrong. It should say 3-jakob 18 mg/3ML it should be a quantity of 27.  He received a quantity of 6 pen in the box. They way the prescription is written they gave what was on the prescription        Please advise!    Requesting a call back.

## 2023-08-03 ENCOUNTER — TELEPHONE (OUTPATIENT)
Dept: FAMILY MEDICINE CLINIC | Facility: CLINIC | Age: 68
End: 2023-08-03

## 2023-08-03 NOTE — TELEPHONE ENCOUNTER
PATIENT IS HAVING CATARACT SURGERY ON AUG 16TH BUT HE WANTS TO MAKE SURE NONE OF HIS MEDICATIONS NEED TO BE CHANGED PRIOR. PLEASE CONTACT PATIENT AND INFORM HIM.

## 2023-08-09 ENCOUNTER — TELEPHONE (OUTPATIENT)
Dept: FAMILY MEDICINE CLINIC | Facility: CLINIC | Age: 68
End: 2023-08-09
Payer: MEDICARE

## 2023-08-09 DIAGNOSIS — E11.21 TYPE 2 DIABETES WITH NEPHROPATHY: Chronic | ICD-10-CM

## 2023-08-09 RX ORDER — LIRAGLUTIDE 6 MG/ML
1.8 INJECTION SUBCUTANEOUS DAILY
Qty: 90 ML | Refills: 1 | Status: SHIPPED | OUTPATIENT
Start: 2023-08-09

## 2023-08-09 NOTE — TELEPHONE ENCOUNTER
This pt stop by regarding his med Liraglutide (Victoza) 18 MG/3ML solution pen-injector injection. His refill is wrong. Med quantity of 27    Please Advise      Requesting a call back

## 2023-08-09 NOTE — TELEPHONE ENCOUNTER
Outpatient Medication Detail    Liraglutide (Victoza) 18 MG/3ML solution pen-injector injection        Sig: Inject 1.8 mg under the skin into the appropriate area as directed Daily.        Sent to pharmacy as: Victoza 18 MG/3ML Subcutaneous Solution Pen-injector (Liraglutide)        Class: Normal        Notes to Pharmacy: Don't fill until pt calls.        Route: Subcutaneous        E-Prescribing Status: Receipt confirmed by pharmacy (7/24/2023  7:54 AM EDT)

## 2023-10-26 ENCOUNTER — TELEPHONE (OUTPATIENT)
Dept: PEDIATRICS | Facility: OTHER | Age: 68
End: 2023-10-26

## 2023-10-26 NOTE — TELEPHONE ENCOUNTER
"  Caller: Junior Pollard Jr. \"Gumaro\"    Relationship: Self    Best call back number: [phone]     What is the best time to reach you: ANY TIME    What was the call regarding: PATIENT IS REQUESTING A CALL BACK FROM DR. MCCAIN'S NURSE TO DISCUSS HIS MEDICATION ISSUE. HE STATES THE MEDICATION IS CALLED VICTOSA.     "

## 2023-10-27 NOTE — PROGRESS NOTES
Chief Complaint:   Chief Complaint   Patient presents with    Diabetes       Junior Pollard Jr. 67 y.o. male who presents today for Medical Management of the below listed issues. He  has a problem list of   Patient Active Problem List   Diagnosis    Abnormal cardiovascular stress test    Multiple actinic keratoses    Chronic coronary artery disease    Benign essential hypertension    Stage 3b chronic kidney disease    Diabetes mellitus with renal complications    Hypercholesterolemia    Elevated creatine kinase level    Onychomycosis of toenail    Osteoarthritis    Proteinuria    Type 2 diabetes with nephropathy    Acute diverticulitis    Diarrhea    Fever    Lower abdominal pain    Uncontrolled type 2 diabetes mellitus with hyperglycemia    CORY (acute kidney injury)    Clostridium difficile colitis    History of diverticulitis    Cholelithiases    Sepsis without acute organ dysfunction (present on admit)    Primary osteoarthritis of both knees   .  Since the last visit, He has not been able to get his Victoza medication due to supply chain issues and is here today to discuss options to replace.  he has been compliant with   Current Outpatient Medications:     Accu-Chek Softclix Lancets lancets, USE AS DIRECTED TO TEST DAILY, Disp: 100 each, Rfl: 3    aspirin (aspirin) 81 MG EC tablet, Take 81 mg by mouth Daily., Disp: , Rfl:     atorvastatin (Lipitor) 40 MG tablet, Take 1 tablet by mouth Daily., Disp: 90 tablet, Rfl: 1    Blood Glucose Monitoring Suppl (Accu-Chek Jordyn Plus) w/Device kit, Use as directed to check BS daily E11.9, Disp: 1 kit, Rfl: 0    dapagliflozin Propanediol (Farxiga) 10 MG tablet, Take 10 mg by mouth Daily., Disp: 90 tablet, Rfl: 1    ezetimibe (Zetia) 10 MG tablet, Take 1 tablet by mouth Daily., Disp: 90 tablet, Rfl: 1    glimepiride (Amaryl) 2 MG tablet, Take 1 tablet by mouth 2 (Two) Times a Day., Disp: 180 tablet, Rfl: 1    glucose blood (Accu-Chek Jordyn Plus) test strip, 1 each by  "Other route Daily. use to test daily as directed, Disp: 100 each, Rfl: 2    Insulin Pen Needle 32G X 4 MM misc, Use QD, Disp: 100 each, Rfl: 3    lisinopril (PRINIVIL,ZESTRIL) 5 MG tablet, Take 1 tablet by mouth Daily., Disp: 90 tablet, Rfl: 1    multivitamin with minerals (MULTIVITAMIN ADULTS 50+ PO), Take 1 tablet by mouth Daily., Disp: , Rfl:     Semaglutide (Rybelsus) 7 MG tablet, Take 7 mg by mouth Daily., Disp: 90 tablet, Rfl: 1.  He denies medication side effects.    All of the other chronic condition(s) listed above are stable w/o issues.    Resp 16   Ht 182.9 cm (72\")   Wt 115 kg (253 lb)   BMI 34.31 kg/m²     Results for orders placed or performed in visit on 06/22/23   Basic Metabolic Panel    Specimen: Blood   Result Value Ref Range    Glucose 101 (H) 65 - 99 mg/dL    BUN 35 (H) 8 - 23 mg/dL    Creatinine 1.92 (H) 0.76 - 1.27 mg/dL    EGFR Result 37.2 (L) >60.0 mL/min/1.73    BUN/Creatinine Ratio 18.2 7.0 - 25.0    Sodium 140 136 - 145 mmol/L    Potassium 4.6 3.5 - 5.2 mmol/L    Chloride 106 98 - 107 mmol/L    Total CO2 21.4 (L) 22.0 - 29.0 mmol/L    Calcium 10.0 8.6 - 10.5 mg/dL   Hemoglobin A1c    Specimen: Blood   Result Value Ref Range    Hemoglobin A1C 7.30 (H) 4.80 - 5.60 %   CBC & Differential    Specimen: Blood   Result Value Ref Range    WBC 7.27 3.40 - 10.80 10*3/mm3    RBC 4.66 4.14 - 5.80 10*6/mm3    Hemoglobin 14.3 13.0 - 17.7 g/dL    Hematocrit 41.6 37.5 - 51.0 %    MCV 89.3 79.0 - 97.0 fL    MCH 30.7 26.6 - 33.0 pg    MCHC 34.4 31.5 - 35.7 g/dL    RDW 12.6 12.3 - 15.4 %    Platelets 237 140 - 450 10*3/mm3    Neutrophil Rel % 49.4 42.7 - 76.0 %    Lymphocyte Rel % 39.2 19.6 - 45.3 %    Monocyte Rel % 7.0 5.0 - 12.0 %    Eosinophil Rel % 2.9 0.3 - 6.2 %    Basophil Rel % 1.2 0.0 - 1.5 %    Neutrophils Absolute 3.59 1.70 - 7.00 10*3/mm3    Lymphocytes Absolute 2.85 0.70 - 3.10 10*3/mm3    Monocytes Absolute 0.51 0.10 - 0.90 10*3/mm3    Eosinophils Absolute 0.21 0.00 - 0.40 10*3/mm3    " Basophils Absolute 0.09 0.00 - 0.20 10*3/mm3    Immature Granulocyte Rel % 0.3 0.0 - 0.5 %    Immature Grans Absolute 0.02 0.00 - 0.05 10*3/mm3    nRBC 0.1 0.0 - 0.2 /100 WBC             The following portions of the patient's history were reviewed and updated as appropriate: allergies, current medications, past family history, past medical history, past social history, past surgical history, and problem list.    Review of Systems   Constitutional:  Negative for activity change, chills and fever.   Respiratory:  Negative for cough.    Cardiovascular:  Negative for chest pain.   Psychiatric/Behavioral:  Negative for dysphoric mood.        Objective              Physical Exam  Constitutional:       General: He is not in acute distress.     Appearance: He is well-developed.   Pulmonary:      Effort: Pulmonary effort is normal.   Neurological:      Mental Status: He is alert and oriented to person, place, and time.   Psychiatric:         Behavior: Behavior normal.         Thought Content: Thought content normal.             Diagnoses and all orders for this visit:    1. Type 2 diabetes with nephropathy (Primary)  -     Semaglutide (Rybelsus) 7 MG tablet; Take 7 mg by mouth Daily.  Dispense: 90 tablet; Refill: 1

## 2023-10-30 ENCOUNTER — OFFICE VISIT (OUTPATIENT)
Dept: FAMILY MEDICINE CLINIC | Facility: CLINIC | Age: 68
End: 2023-10-30
Payer: MEDICARE

## 2023-10-30 VITALS — WEIGHT: 253 LBS | RESPIRATION RATE: 16 BRPM | HEIGHT: 72 IN | BODY MASS INDEX: 34.27 KG/M2

## 2023-10-30 DIAGNOSIS — E11.21 TYPE 2 DIABETES WITH NEPHROPATHY: Primary | Chronic | ICD-10-CM

## 2023-10-30 PROCEDURE — 3051F HG A1C>EQUAL 7.0%<8.0%: CPT | Performed by: FAMILY MEDICINE

## 2023-10-30 PROCEDURE — 1159F MED LIST DOCD IN RCRD: CPT | Performed by: FAMILY MEDICINE

## 2023-10-30 PROCEDURE — 99213 OFFICE O/P EST LOW 20 MIN: CPT | Performed by: FAMILY MEDICINE

## 2023-10-30 PROCEDURE — 1160F RVW MEDS BY RX/DR IN RCRD: CPT | Performed by: FAMILY MEDICINE

## 2023-10-30 RX ORDER — ORAL SEMAGLUTIDE 7 MG/1
7 TABLET ORAL DAILY
Qty: 90 TABLET | Refills: 1 | Status: SHIPPED | OUTPATIENT
Start: 2023-10-30

## 2024-01-18 RX ORDER — ORAL SEMAGLUTIDE 7 MG/1
7 TABLET ORAL DAILY
Qty: 90 TABLET | Refills: 1 | Status: CANCELLED | OUTPATIENT
Start: 2024-01-18

## 2024-01-23 NOTE — PROGRESS NOTES
Chief Complaint:   Chief Complaint   Patient presents with    Diabetes     Lab results / meds reviewed with pt today / [facility]     Hypertension    Hyperlipidemia    Arthritis     MED REFILL DUE        Junior Pollard Jr. 68 y.o. male who presents today for Medical Management of the below listed issues. He  has a problem list of   Patient Active Problem List   Diagnosis    Abnormal cardiovascular stress test    Multiple actinic keratoses    Chronic coronary artery disease    Benign essential hypertension    Stage 3b chronic kidney disease    Diabetes mellitus with renal complications    Hypercholesterolemia    Elevated creatine kinase level    Onychomycosis of toenail    Osteoarthritis    Proteinuria    Type 2 diabetes with nephropathy    Acute diverticulitis    Diarrhea    Fever    Lower abdominal pain    Uncontrolled type 2 diabetes mellitus with hyperglycemia    CORY (acute kidney injury)    Clostridium difficile colitis    History of diverticulitis    Cholelithiases    Sepsis without acute organ dysfunction (present on admit)    Primary osteoarthritis of both knees   .  Since the last visit, He has overall felt well.  he has been compliant with   Current Outpatient Medications:     atorvastatin (Lipitor) 40 MG tablet, Take 1 tablet by mouth Daily., Disp: 90 tablet, Rfl: 1    dapagliflozin Propanediol (Farxiga) 10 MG tablet, Take 10 mg by mouth Daily., Disp: 90 tablet, Rfl: 1    ezetimibe (Zetia) 10 MG tablet, Take 1 tablet by mouth Daily., Disp: 90 tablet, Rfl: 1    glimepiride (Amaryl) 2 MG tablet, Take 1 tablet by mouth 2 (Two) Times a Day., Disp: 180 tablet, Rfl: 1    lisinopril (PRINIVIL,ZESTRIL) 5 MG tablet, Take 1 tablet by mouth Daily., Disp: 90 tablet, Rfl: 1    Accu-Chek Softclix Lancets lancets, USE AS DIRECTED TO TEST DAILY, Disp: 100 each, Rfl: 3    aspirin (aspirin) 81 MG EC tablet, Take 81 mg by mouth Daily., Disp: , Rfl:     Blood Glucose Monitoring Suppl (Accu-Chek Jordyn Plus) w/Device  "kit, Use as directed to check BS daily E11.9, Disp: 1 kit, Rfl: 0    glucose blood (Accu-Chek Jordyn Plus) test strip, 1 each by Other route Daily. use to test daily as directed, Disp: 100 each, Rfl: 2    Insulin Pen Needle 32G X 4 MM misc, Use QD, Disp: 100 each, Rfl: 3    multivitamin with minerals (MULTIVITAMIN ADULTS 50+ PO), Take 1 tablet by mouth Daily., Disp: , Rfl:     Semaglutide (Rybelsus) 14 MG tablet, Take 1 tablet by mouth Daily., Disp: 90 tablet, Rfl: 1.  He denies medication side effects.    All of the other chronic condition(s) listed above are stable w/o issues.    /66   Pulse 77   Temp 97.7 °F (36.5 °C) (Oral)   Resp 16   Ht 182.9 cm (72\")   Wt 113 kg (250 lb)   SpO2 96%   BMI 33.91 kg/m²     Results for orders placed or performed in visit on 12/21/23   Comprehensive metabolic panel    Specimen: Blood   Result Value Ref Range    Glucose 170 (H) 65 - 99 mg/dL    BUN 35 (H) 8 - 23 mg/dL    Creatinine 1.84 (H) 0.76 - 1.27 mg/dL    EGFR Result 39.0 (L) >60.0 mL/min/1.73    BUN/Creatinine Ratio 19.0 7.0 - 25.0    Sodium 137 136 - 145 mmol/L    Potassium 4.9 3.5 - 5.2 mmol/L    Chloride 104 98 - 107 mmol/L    Total CO2 22.3 22.0 - 29.0 mmol/L    Calcium 9.7 8.6 - 10.5 mg/dL    Total Protein 6.8 6.0 - 8.5 g/dL    Albumin 4.6 3.5 - 5.2 g/dL    Globulin 2.2 gm/dL    A/G Ratio 2.1 g/dL    Total Bilirubin 0.4 0.0 - 1.2 mg/dL    Alkaline Phosphatase 92 39 - 117 U/L    AST (SGOT) 17 1 - 40 U/L    ALT (SGPT) 19 1 - 41 U/L   Lipid panel    Specimen: Blood   Result Value Ref Range    Total Cholesterol 109 0 - 200 mg/dL    Triglycerides 90 0 - 150 mg/dL    HDL Cholesterol 31 (L) 40 - 60 mg/dL    VLDL Cholesterol Seg[place] 18 5 - 40 mg/dL    LDL Chol Calc (NIH) 60 0 - 100 mg/dL   TSH    Specimen: Blood   Result Value Ref Range    TSH 2.610 0.270 - 4.200 uIU/mL   PSA    Specimen: Blood   Result Value Ref Range    PSA 0.545 0.000 - 4.000 ng/mL   Hemoglobin A1c    Specimen: Blood   Result Value Ref Range    " Hemoglobin A1C 8.40 (H) 4.80 - 5.60 %   MicroAlbumin, Urine, Random - Urine, Clean Catch    Specimen: Urine, Clean Catch   Result Value Ref Range    Microalbumin, Urine 8.2 Not Estab. ug/mL   CBC and Differential    Specimen: Blood   Result Value Ref Range    WBC 8.81 3.40 - 10.80 10*3/mm3    RBC 4.63 4.14 - 5.80 10*6/mm3    Hemoglobin 14.5 13.0 - 17.7 g/dL    Hematocrit 41.7 37.5 - 51.0 %    MCV 90.1 79.0 - 97.0 fL    MCH 31.3 26.6 - 33.0 pg    MCHC 34.8 31.5 - 35.7 g/dL    RDW 12.3 12.3 - 15.4 %    Platelets 254 140 - 450 10*3/mm3    Neutrophil Rel % 54.0 42.7 - 76.0 %    Lymphocyte Rel % 36.5 19.6 - 45.3 %    Monocyte Rel % 6.4 5.0 - 12.0 %    Eosinophil Rel % 1.8 0.3 - 6.2 %    Basophil Rel % 1.0 0.0 - 1.5 %    Neutrophils Absolute 4.75 1.70 - 7.00 10*3/mm3    Lymphocytes Absolute 3.22 (H) 0.70 - 3.10 10*3/mm3    Monocytes Absolute 0.56 0.10 - 0.90 10*3/mm3    Eosinophils Absolute 0.16 0.00 - 0.40 10*3/mm3    Basophils Absolute 0.09 0.00 - 0.20 10*3/mm3    Immature Granulocyte Rel % 0.3 0.0 - 0.5 %    Immature Grans Absolute 0.03 0.00 - 0.05 10*3/mm3    nRBC 0.0 0.0 - 0.2 /100 WBC             The following portions of the patient's history were reviewed and updated as appropriate: allergies, current medications, past family history, past medical history, past social history, past surgical history, and problem list.    Review of Systems   Constitutional:  Negative for activity change, chills and fever.   Respiratory:  Negative for cough.    Cardiovascular:  Negative for chest pain.   Psychiatric/Behavioral:  Negative for dysphoric mood.        Objective              Physical Exam  Constitutional:       General: He is not in acute distress.     Appearance: He is well-developed.   Cardiovascular:      Rate and Rhythm: Normal rate and regular rhythm.   Pulmonary:      Effort: Pulmonary effort is normal.      Breath sounds: Normal breath sounds.   Neurological:      Mental Status: He is alert and oriented to person,  place, and time.   Psychiatric:         Behavior: Behavior normal.         Thought Content: Thought content normal.     Labs reviewed with pt today during visit. All questions answered.          Diagnoses and all orders for this visit:    1. Type 2 diabetes with nephropathy (Primary)  Comments:  Worsening; medication adjusted at visit; diet and exercise discussed  Orders:  -     dapagliflozin Propanediol (Farxiga) 10 MG tablet; Take 10 mg by mouth Daily.  Dispense: 90 tablet; Refill: 1  -     glimepiride (Amaryl) 2 MG tablet; Take 1 tablet by mouth 2 (Two) Times a Day.  Dispense: 180 tablet; Refill: 1  -     Semaglutide (Rybelsus) 14 MG tablet; Take 1 tablet by mouth Daily.  Dispense: 90 tablet; Refill: 1  -     Basic Metabolic Panel; Future  -     Hemoglobin A1c; Future    2. Hypercholesterolemia  -     ezetimibe (Zetia) 10 MG tablet; Take 1 tablet by mouth Daily.  Dispense: 90 tablet; Refill: 1  -     atorvastatin (Lipitor) 40 MG tablet; Take 1 tablet by mouth Daily.  Dispense: 90 tablet; Refill: 1    3. Benign essential hypertension  -     lisinopril (PRINIVIL,ZESTRIL) 5 MG tablet; Take 1 tablet by mouth Daily.  Dispense: 90 tablet; Refill: 1

## 2024-01-24 ENCOUNTER — OFFICE VISIT (OUTPATIENT)
Dept: FAMILY MEDICINE CLINIC | Facility: CLINIC | Age: 69
End: 2024-01-24
Payer: MEDICARE

## 2024-01-24 VITALS
WEIGHT: 250 LBS | TEMPERATURE: 97.7 F | RESPIRATION RATE: 16 BRPM | SYSTOLIC BLOOD PRESSURE: 133 MMHG | HEIGHT: 72 IN | BODY MASS INDEX: 33.86 KG/M2 | HEART RATE: 77 BPM | DIASTOLIC BLOOD PRESSURE: 66 MMHG | OXYGEN SATURATION: 96 %

## 2024-01-24 DIAGNOSIS — E11.21 TYPE 2 DIABETES WITH NEPHROPATHY: Primary | Chronic | ICD-10-CM

## 2024-01-24 DIAGNOSIS — E78.00 HYPERCHOLESTEROLEMIA: Chronic | ICD-10-CM

## 2024-01-24 DIAGNOSIS — I10 BENIGN ESSENTIAL HYPERTENSION: Chronic | ICD-10-CM

## 2024-01-24 PROCEDURE — 1159F MED LIST DOCD IN RCRD: CPT | Performed by: FAMILY MEDICINE

## 2024-01-24 PROCEDURE — 3075F SYST BP GE 130 - 139MM HG: CPT | Performed by: FAMILY MEDICINE

## 2024-01-24 PROCEDURE — 3078F DIAST BP <80 MM HG: CPT | Performed by: FAMILY MEDICINE

## 2024-01-24 PROCEDURE — 99214 OFFICE O/P EST MOD 30 MIN: CPT | Performed by: FAMILY MEDICINE

## 2024-01-24 PROCEDURE — 3052F HG A1C>EQUAL 8.0%<EQUAL 9.0%: CPT | Performed by: FAMILY MEDICINE

## 2024-01-24 PROCEDURE — 1160F RVW MEDS BY RX/DR IN RCRD: CPT | Performed by: FAMILY MEDICINE

## 2024-01-24 RX ORDER — ORAL SEMAGLUTIDE 14 MG/1
14 TABLET ORAL DAILY
Qty: 90 TABLET | Refills: 1 | Status: SHIPPED | OUTPATIENT
Start: 2024-01-24

## 2024-01-24 RX ORDER — ORAL SEMAGLUTIDE 7 MG/1
7 TABLET ORAL DAILY
COMMUNITY
Start: 2023-10-30 | End: 2024-01-24

## 2024-01-24 RX ORDER — EZETIMIBE 10 MG/1
10 TABLET ORAL DAILY
Qty: 90 TABLET | Refills: 1 | Status: SHIPPED | OUTPATIENT
Start: 2024-01-24

## 2024-01-24 RX ORDER — ATORVASTATIN CALCIUM 40 MG/1
40 TABLET, FILM COATED ORAL DAILY
Qty: 90 TABLET | Refills: 1 | Status: SHIPPED | OUTPATIENT
Start: 2024-01-24

## 2024-01-24 RX ORDER — GLIMEPIRIDE 2 MG/1
2 TABLET ORAL
Qty: 180 TABLET | Refills: 1 | Status: SHIPPED | OUTPATIENT
Start: 2024-01-24

## 2024-01-24 RX ORDER — LISINOPRIL 5 MG/1
5 TABLET ORAL DAILY
Qty: 90 TABLET | Refills: 1 | Status: SHIPPED | OUTPATIENT
Start: 2024-01-24

## 2024-01-25 DIAGNOSIS — E11.21 TYPE 2 DIABETES WITH NEPHROPATHY: Chronic | ICD-10-CM

## 2024-01-25 RX ORDER — BLOOD SUGAR DIAGNOSTIC
1 STRIP MISCELLANEOUS DAILY
Qty: 100 EACH | Refills: 2 | Status: CANCELLED | OUTPATIENT
Start: 2024-01-25

## 2024-01-25 RX ORDER — BLOOD SUGAR DIAGNOSTIC
1 STRIP MISCELLANEOUS DAILY
Qty: 100 EACH | Refills: 3 | Status: SHIPPED | OUTPATIENT
Start: 2024-01-25

## 2024-01-25 NOTE — TELEPHONE ENCOUNTER
"  Caller: Junior Pollard Jr. \"Gumaro\"    Relationship: Self    Best call back number: [phone]    Requested Prescriptions:   Requested Prescriptions     Pending Prescriptions Disp Refills    glucose blood (Accu-Chek Jordyn Plus) test strip 100 each 2     Si each by Other route Daily. use to test daily as directed        Pharmacy where request should be sent: [facility] [id number] [address] AT [facility] [phone] [facility] [phone] FX     Last office visit with prescribing clinician: 2024   Last telemedicine visit with prescribing clinician: Visit date not found   Next office visit with prescribing clinician: 2024     Additional details provided by patient: HE ALSO WOULD LIKE TO KNOW IF HE CAN GET THE GENERIC FARXIGA NEXT TIME HE NEEDS A REFILL.  PLEASE CALL AND ADVISE.    Does the patient have less than a 3 day supply:  [] Yes  [x] No    Would you like a call back once the refill request has been completed: [] Yes [x] No    If the office needs to give you a call back, can they leave a voicemail: [] Yes [x] No    Dilcia Vidal Rep   24 10:48 EST         "

## 2024-05-15 ENCOUNTER — APPOINTMENT (OUTPATIENT)
Dept: ULTRASOUND IMAGING | Facility: HOSPITAL | Age: 69
End: 2024-05-15
Payer: MEDICARE

## 2024-05-15 ENCOUNTER — APPOINTMENT (OUTPATIENT)
Dept: GENERAL RADIOLOGY | Facility: HOSPITAL | Age: 69
End: 2024-05-15
Payer: MEDICARE

## 2024-05-15 ENCOUNTER — HOSPITAL ENCOUNTER (OUTPATIENT)
Facility: HOSPITAL | Age: 69
Setting detail: OBSERVATION
Discharge: HOME | End: 2024-05-16
Attending: EMERGENCY MEDICINE | Admitting: INTERNAL MEDICINE
Payer: MEDICARE

## 2024-05-15 ENCOUNTER — APPOINTMENT (OUTPATIENT)
Dept: CT IMAGING | Facility: HOSPITAL | Age: 69
End: 2024-05-15
Payer: MEDICARE

## 2024-05-15 DIAGNOSIS — K85.90 ACUTE PANCREATITIS WITHOUT INFECTION OR NECROSIS, UNSPECIFIED PANCREATITIS TYPE: Primary | ICD-10-CM

## 2024-05-15 DIAGNOSIS — K80.20 CALCULUS OF GALLBLADDER WITHOUT CHOLECYSTITIS WITHOUT OBSTRUCTION: ICD-10-CM

## 2024-05-15 DIAGNOSIS — R19.7 DIARRHEA, UNSPECIFIED TYPE: ICD-10-CM

## 2024-05-15 LAB
ALBUMIN SERPL-MCNC: 4.4 G/DL (ref 3.5–5.2)
ALBUMIN/GLOB SERPL: 1.6 G/DL
ALP SERPL-CCNC: 79 U/L (ref 39–117)
ALT SERPL W P-5'-P-CCNC: 18 U/L (ref 1–41)
ANION GAP SERPL CALCULATED.3IONS-SCNC: 11.3 MMOL/L (ref 5–15)
AST SERPL-CCNC: 14 U/L (ref 1–40)
BASOPHILS # BLD AUTO: 0.03 10*3/MM3 (ref 0–0.2)
BASOPHILS NFR BLD AUTO: 0.3 % (ref 0–1.5)
BILIRUB SERPL-MCNC: 0.6 MG/DL (ref 0–1.2)
BILIRUB UR QL STRIP: NEGATIVE
BUN SERPL-MCNC: 29 MG/DL (ref 8–23)
BUN/CREAT SERPL: 16.4 (ref 7–25)
CALCIUM SPEC-SCNC: 10.1 MG/DL (ref 8.6–10.5)
CHLORIDE SERPL-SCNC: 107 MMOL/L (ref 98–107)
CLARITY UR: CLEAR
CO2 SERPL-SCNC: 21.7 MMOL/L (ref 22–29)
COLOR UR: YELLOW
CREAT SERPL-MCNC: 1.77 MG/DL (ref 0.76–1.27)
D-LACTATE SERPL-SCNC: 0.8 MMOL/L (ref 0.5–2)
DEPRECATED RDW RBC AUTO: 43 FL (ref 37–54)
EGFRCR SERPLBLD CKD-EPI 2021: 40.8 ML/MIN/1.73
EOSINOPHIL # BLD AUTO: 0.15 10*3/MM3 (ref 0–0.4)
EOSINOPHIL NFR BLD AUTO: 1.7 % (ref 0.3–6.2)
ERYTHROCYTE [DISTWIDTH] IN BLOOD BY AUTOMATED COUNT: 12.5 % (ref 12.3–15.4)
GLOBULIN UR ELPH-MCNC: 2.7 GM/DL
GLUCOSE SERPL-MCNC: 103 MG/DL (ref 65–99)
GLUCOSE UR STRIP-MCNC: ABNORMAL MG/DL
HCT VFR BLD AUTO: 41.5 % (ref 37.5–51)
HGB BLD-MCNC: 13.8 G/DL (ref 13–17.7)
HGB UR QL STRIP.AUTO: NEGATIVE
IMM GRANULOCYTES # BLD AUTO: 0.01 10*3/MM3 (ref 0–0.05)
IMM GRANULOCYTES NFR BLD AUTO: 0.1 % (ref 0–0.5)
KETONES UR QL STRIP: NEGATIVE
LEUKOCYTE ESTERASE UR QL STRIP.AUTO: NEGATIVE
LIPASE SERPL-CCNC: 188 U/L (ref 13–60)
LYMPHOCYTES # BLD AUTO: 2.33 10*3/MM3 (ref 0.7–3.1)
LYMPHOCYTES NFR BLD AUTO: 26.3 % (ref 19.6–45.3)
MCH RBC QN AUTO: 30.7 PG (ref 26.6–33)
MCHC RBC AUTO-ENTMCNC: 33.3 G/DL (ref 31.5–35.7)
MCV RBC AUTO: 92.4 FL (ref 79–97)
MONOCYTES # BLD AUTO: 0.56 10*3/MM3 (ref 0.1–0.9)
MONOCYTES NFR BLD AUTO: 6.3 % (ref 5–12)
NEUTROPHILS NFR BLD AUTO: 5.77 10*3/MM3 (ref 1.7–7)
NEUTROPHILS NFR BLD AUTO: 65.3 % (ref 42.7–76)
NITRITE UR QL STRIP: NEGATIVE
PH UR STRIP.AUTO: <=5 [PH] (ref 5–8)
PLATELET # BLD AUTO: 270 10*3/MM3 (ref 140–450)
PMV BLD AUTO: 9.9 FL (ref 6–12)
POTASSIUM SERPL-SCNC: 4.1 MMOL/L (ref 3.5–5.2)
PROT SERPL-MCNC: 7.1 G/DL (ref 6–8.5)
PROT UR QL STRIP: NEGATIVE
RBC # BLD AUTO: 4.49 10*6/MM3 (ref 4.14–5.8)
SODIUM SERPL-SCNC: 140 MMOL/L (ref 136–145)
SP GR UR STRIP: 1.01 (ref 1–1.03)
UROBILINOGEN UR QL STRIP: ABNORMAL
WBC NRBC COR # BLD AUTO: 8.85 10*3/MM3 (ref 3.4–10.8)

## 2024-05-15 PROCEDURE — 85025 COMPLETE CBC W/AUTO DIFF WBC: CPT

## 2024-05-15 PROCEDURE — 83615 LACTATE (LD) (LDH) ENZYME: CPT | Performed by: EMERGENCY MEDICINE

## 2024-05-15 PROCEDURE — 96374 THER/PROPH/DIAG INJ IV PUSH: CPT

## 2024-05-15 PROCEDURE — 93005 ELECTROCARDIOGRAM TRACING: CPT | Performed by: EMERGENCY MEDICINE

## 2024-05-15 PROCEDURE — 76705 ECHO EXAM OF ABDOMEN: CPT

## 2024-05-15 PROCEDURE — 83605 ASSAY OF LACTIC ACID: CPT

## 2024-05-15 PROCEDURE — 80053 COMPREHEN METABOLIC PANEL: CPT

## 2024-05-15 PROCEDURE — 74176 CT ABD & PELVIS W/O CONTRAST: CPT

## 2024-05-15 PROCEDURE — 99285 EMERGENCY DEPT VISIT HI MDM: CPT

## 2024-05-15 PROCEDURE — 83690 ASSAY OF LIPASE: CPT

## 2024-05-15 PROCEDURE — 99284 EMERGENCY DEPT VISIT MOD MDM: CPT | Performed by: EMERGENCY MEDICINE

## 2024-05-15 PROCEDURE — 71045 X-RAY EXAM CHEST 1 VIEW: CPT

## 2024-05-15 PROCEDURE — 81003 URINALYSIS AUTO W/O SCOPE: CPT | Performed by: EMERGENCY MEDICINE

## 2024-05-15 RX ORDER — PANTOPRAZOLE SODIUM 40 MG/10ML
40 INJECTION, POWDER, LYOPHILIZED, FOR SOLUTION INTRAVENOUS ONCE
Status: COMPLETED | OUTPATIENT
Start: 2024-05-15 | End: 2024-05-15

## 2024-05-15 RX ORDER — SODIUM CHLORIDE 0.9 % (FLUSH) 0.9 %
10 SYRINGE (ML) INJECTION AS NEEDED
Status: DISCONTINUED | OUTPATIENT
Start: 2024-05-15 | End: 2024-05-16 | Stop reason: HOSPADM

## 2024-05-15 RX ADMIN — PANTOPRAZOLE SODIUM 40 MG: 40 INJECTION, POWDER, FOR SOLUTION INTRAVENOUS at 21:31

## 2024-05-16 ENCOUNTER — TELEPHONE (OUTPATIENT)
Dept: SURGERY | Facility: CLINIC | Age: 69
End: 2024-05-16
Payer: MEDICARE

## 2024-05-16 ENCOUNTER — READMISSION MANAGEMENT (OUTPATIENT)
Dept: CALL CENTER | Facility: HOSPITAL | Age: 69
End: 2024-05-16
Payer: MEDICARE

## 2024-05-16 ENCOUNTER — PREP FOR SURGERY (OUTPATIENT)
Dept: OTHER | Facility: HOSPITAL | Age: 69
End: 2024-05-16
Payer: MEDICARE

## 2024-05-16 VITALS
HEART RATE: 60 BPM | SYSTOLIC BLOOD PRESSURE: 128 MMHG | OXYGEN SATURATION: 99 % | RESPIRATION RATE: 16 BRPM | TEMPERATURE: 98.4 F | HEIGHT: 72 IN | WEIGHT: 248 LBS | BODY MASS INDEX: 33.59 KG/M2 | DIASTOLIC BLOOD PRESSURE: 87 MMHG

## 2024-05-16 DIAGNOSIS — K85.10 ACUTE BILIARY PANCREATITIS WITHOUT INFECTION OR NECROSIS: Primary | ICD-10-CM

## 2024-05-16 PROBLEM — K85.90 ACUTE PANCREATITIS: Status: ACTIVE | Noted: 2024-05-16

## 2024-05-16 PROBLEM — R10.9 ABDOMINAL PAIN: Status: ACTIVE | Noted: 2020-10-09

## 2024-05-16 LAB
ADV 40+41 DNA STL QL NAA+NON-PROBE: NOT DETECTED
ALBUMIN SERPL-MCNC: 3.9 G/DL (ref 3.5–5.2)
ALBUMIN/GLOB SERPL: 1.4 G/DL
ALP SERPL-CCNC: 72 U/L (ref 39–117)
ALT SERPL W P-5'-P-CCNC: 16 U/L (ref 1–41)
ANION GAP SERPL CALCULATED.3IONS-SCNC: 8.9 MMOL/L (ref 5–15)
AST SERPL-CCNC: 8 U/L (ref 1–40)
ASTRO TYP 1-8 RNA STL QL NAA+NON-PROBE: NOT DETECTED
BILIRUB SERPL-MCNC: 0.7 MG/DL (ref 0–1.2)
BUN SERPL-MCNC: 26 MG/DL (ref 8–23)
BUN/CREAT SERPL: 17.2 (ref 7–25)
C CAYETANENSIS DNA STL QL NAA+NON-PROBE: NOT DETECTED
C COLI+JEJ+UPSA DNA STL QL NAA+NON-PROBE: NOT DETECTED
C DIFF TOX GENS STL QL NAA+PROBE: NEGATIVE
CALCIUM SPEC-SCNC: 9.4 MG/DL (ref 8.6–10.5)
CHLORIDE SERPL-SCNC: 111 MMOL/L (ref 98–107)
CO2 SERPL-SCNC: 20.1 MMOL/L (ref 22–29)
CREAT SERPL-MCNC: 1.51 MG/DL (ref 0.76–1.27)
CRYPTOSP DNA STL QL NAA+NON-PROBE: NOT DETECTED
DEPRECATED RDW RBC AUTO: 40.1 FL (ref 37–54)
E HISTOLYT DNA STL QL NAA+NON-PROBE: NOT DETECTED
EAEC PAA PLAS AGGR+AATA ST NAA+NON-PRB: NOT DETECTED
EC STX1+STX2 GENES STL QL NAA+NON-PROBE: NOT DETECTED
EGFRCR SERPLBLD CKD-EPI 2021: 49.4 ML/MIN/1.73
EPEC EAE GENE STL QL NAA+NON-PROBE: NOT DETECTED
ERYTHROCYTE [DISTWIDTH] IN BLOOD BY AUTOMATED COUNT: 12.2 % (ref 12.3–15.4)
ETEC LTA+ST1A+ST1B TOX ST NAA+NON-PROBE: NOT DETECTED
G LAMBLIA DNA STL QL NAA+NON-PROBE: NOT DETECTED
GLOBULIN UR ELPH-MCNC: 2.8 GM/DL
GLUCOSE BLDC GLUCOMTR-MCNC: 126 MG/DL (ref 70–130)
GLUCOSE BLDC GLUCOMTR-MCNC: 57 MG/DL (ref 70–130)
GLUCOSE BLDC GLUCOMTR-MCNC: 93 MG/DL (ref 70–130)
GLUCOSE SERPL-MCNC: 59 MG/DL (ref 65–99)
HCT VFR BLD AUTO: 38.3 % (ref 37.5–51)
HGB BLD-MCNC: 12.7 G/DL (ref 13–17.7)
LDH SERPL-CCNC: 165 U/L (ref 135–225)
LIPASE SERPL-CCNC: 85 U/L (ref 13–60)
MCH RBC QN AUTO: 30.4 PG (ref 26.6–33)
MCHC RBC AUTO-ENTMCNC: 33.2 G/DL (ref 31.5–35.7)
MCV RBC AUTO: 91.6 FL (ref 79–97)
NOROVIRUS GI+II RNA STL QL NAA+NON-PROBE: NOT DETECTED
P SHIGELLOIDES DNA STL QL NAA+NON-PROBE: NOT DETECTED
PLATELET # BLD AUTO: 225 10*3/MM3 (ref 140–450)
PMV BLD AUTO: 9.9 FL (ref 6–12)
POTASSIUM SERPL-SCNC: 3.5 MMOL/L (ref 3.5–5.2)
PROT SERPL-MCNC: 6.7 G/DL (ref 6–8.5)
QT INTERVAL: 392 MS
QTC INTERVAL: 398 MS
RBC # BLD AUTO: 4.18 10*6/MM3 (ref 4.14–5.8)
RVA RNA STL QL NAA+NON-PROBE: NOT DETECTED
S ENT+BONG DNA STL QL NAA+NON-PROBE: NOT DETECTED
SAPO I+II+IV+V RNA STL QL NAA+NON-PROBE: NOT DETECTED
SHIGELLA SP+EIEC IPAH ST NAA+NON-PROBE: NOT DETECTED
SODIUM SERPL-SCNC: 140 MMOL/L (ref 136–145)
V CHOL+PARA+VUL DNA STL QL NAA+NON-PROBE: NOT DETECTED
V CHOLERAE DNA STL QL NAA+NON-PROBE: NOT DETECTED
WBC NRBC COR # BLD AUTO: 8.16 10*3/MM3 (ref 3.4–10.8)
Y ENTEROCOL DNA STL QL NAA+NON-PROBE: NOT DETECTED

## 2024-05-16 PROCEDURE — 83690 ASSAY OF LIPASE: CPT | Performed by: INTERNAL MEDICINE

## 2024-05-16 PROCEDURE — 80053 COMPREHEN METABOLIC PANEL: CPT | Performed by: NURSE PRACTITIONER

## 2024-05-16 PROCEDURE — 87493 C DIFF AMPLIFIED PROBE: CPT | Performed by: NURSE PRACTITIONER

## 2024-05-16 PROCEDURE — 99204 OFFICE O/P NEW MOD 45 MIN: CPT | Performed by: SURGERY

## 2024-05-16 PROCEDURE — 82948 REAGENT STRIP/BLOOD GLUCOSE: CPT

## 2024-05-16 PROCEDURE — G0378 HOSPITAL OBSERVATION PER HR: HCPCS

## 2024-05-16 PROCEDURE — 96361 HYDRATE IV INFUSION ADD-ON: CPT

## 2024-05-16 PROCEDURE — 96375 TX/PRO/DX INJ NEW DRUG ADDON: CPT

## 2024-05-16 PROCEDURE — 85027 COMPLETE CBC AUTOMATED: CPT | Performed by: NURSE PRACTITIONER

## 2024-05-16 PROCEDURE — 25810000003 LACTATED RINGERS PER 1000 ML: Performed by: NURSE PRACTITIONER

## 2024-05-16 PROCEDURE — 87507 IADNA-DNA/RNA PROBE TQ 12-25: CPT | Performed by: NURSE PRACTITIONER

## 2024-05-16 RX ORDER — ACETAMINOPHEN 500 MG
1000 TABLET ORAL ONCE
OUTPATIENT
Start: 2024-05-16 | End: 2024-05-16

## 2024-05-16 RX ORDER — MELATONIN
2000 DAILY
Status: DISCONTINUED | OUTPATIENT
Start: 2024-05-16 | End: 2024-05-16 | Stop reason: HOSPADM

## 2024-05-16 RX ORDER — FAMOTIDINE 10 MG/ML
20 INJECTION, SOLUTION INTRAVENOUS EVERY 12 HOURS SCHEDULED
Status: DISCONTINUED | OUTPATIENT
Start: 2024-05-16 | End: 2024-05-16 | Stop reason: HOSPADM

## 2024-05-16 RX ORDER — ACETAMINOPHEN 325 MG/1
650 TABLET ORAL EVERY 6 HOURS PRN
Start: 2024-05-16

## 2024-05-16 RX ORDER — ACETAMINOPHEN 160 MG/5ML
650 SOLUTION ORAL EVERY 4 HOURS PRN
Status: DISCONTINUED | OUTPATIENT
Start: 2024-05-16 | End: 2024-05-16 | Stop reason: HOSPADM

## 2024-05-16 RX ORDER — ONDANSETRON 4 MG/1
4 TABLET, ORALLY DISINTEGRATING ORAL EVERY 8 HOURS PRN
Qty: 10 TABLET | Refills: 0 | Status: SHIPPED | OUTPATIENT
Start: 2024-05-16

## 2024-05-16 RX ORDER — ONDANSETRON 4 MG/1
4 TABLET, ORALLY DISINTEGRATING ORAL EVERY 6 HOURS PRN
Status: DISCONTINUED | OUTPATIENT
Start: 2024-05-16 | End: 2024-05-16 | Stop reason: HOSPADM

## 2024-05-16 RX ORDER — POLYETHYLENE GLYCOL 3350 17 G/17G
17 POWDER, FOR SOLUTION ORAL DAILY PRN
Status: DISCONTINUED | OUTPATIENT
Start: 2024-05-16 | End: 2024-05-16 | Stop reason: HOSPADM

## 2024-05-16 RX ORDER — ASPIRIN 81 MG/1
81 TABLET ORAL DAILY
Status: DISCONTINUED | OUTPATIENT
Start: 2024-05-16 | End: 2024-05-16 | Stop reason: HOSPADM

## 2024-05-16 RX ORDER — AMOXICILLIN 250 MG
2 CAPSULE ORAL 2 TIMES DAILY PRN
Status: DISCONTINUED | OUTPATIENT
Start: 2024-05-16 | End: 2024-05-16 | Stop reason: HOSPADM

## 2024-05-16 RX ORDER — ONDANSETRON 2 MG/ML
4 INJECTION INTRAMUSCULAR; INTRAVENOUS EVERY 6 HOURS PRN
Status: DISCONTINUED | OUTPATIENT
Start: 2024-05-16 | End: 2024-05-16 | Stop reason: HOSPADM

## 2024-05-16 RX ORDER — BISACODYL 5 MG/1
5 TABLET, DELAYED RELEASE ORAL DAILY PRN
Status: DISCONTINUED | OUTPATIENT
Start: 2024-05-16 | End: 2024-05-16 | Stop reason: HOSPADM

## 2024-05-16 RX ORDER — ACETAMINOPHEN 325 MG/1
650 TABLET ORAL EVERY 4 HOURS PRN
Status: DISCONTINUED | OUTPATIENT
Start: 2024-05-16 | End: 2024-05-16 | Stop reason: HOSPADM

## 2024-05-16 RX ORDER — SODIUM CHLORIDE 9 MG/ML
40 INJECTION, SOLUTION INTRAVENOUS AS NEEDED
Status: DISCONTINUED | OUTPATIENT
Start: 2024-05-16 | End: 2024-05-16 | Stop reason: HOSPADM

## 2024-05-16 RX ORDER — SODIUM CHLORIDE, SODIUM LACTATE, POTASSIUM CHLORIDE, CALCIUM CHLORIDE 600; 310; 30; 20 MG/100ML; MG/100ML; MG/100ML; MG/100ML
125 INJECTION, SOLUTION INTRAVENOUS CONTINUOUS
Status: DISCONTINUED | OUTPATIENT
Start: 2024-05-16 | End: 2024-05-16

## 2024-05-16 RX ORDER — MELATONIN
2000 DAILY
COMMUNITY

## 2024-05-16 RX ORDER — IBUPROFEN 600 MG/1
1 TABLET ORAL
Status: DISCONTINUED | OUTPATIENT
Start: 2024-05-16 | End: 2024-05-16 | Stop reason: HOSPADM

## 2024-05-16 RX ORDER — CALCIUM CARBONATE 500 MG/1
2 TABLET, CHEWABLE ORAL 2 TIMES DAILY PRN
Status: DISCONTINUED | OUTPATIENT
Start: 2024-05-16 | End: 2024-05-16 | Stop reason: HOSPADM

## 2024-05-16 RX ORDER — SODIUM CHLORIDE 0.9 % (FLUSH) 0.9 %
10 SYRINGE (ML) INJECTION EVERY 12 HOURS SCHEDULED
Status: DISCONTINUED | OUTPATIENT
Start: 2024-05-16 | End: 2024-05-16 | Stop reason: HOSPADM

## 2024-05-16 RX ORDER — SODIUM CHLORIDE 0.9 % (FLUSH) 0.9 %
10 SYRINGE (ML) INJECTION AS NEEDED
Status: DISCONTINUED | OUTPATIENT
Start: 2024-05-16 | End: 2024-05-16 | Stop reason: HOSPADM

## 2024-05-16 RX ORDER — BISACODYL 10 MG
10 SUPPOSITORY, RECTAL RECTAL DAILY PRN
Status: DISCONTINUED | OUTPATIENT
Start: 2024-05-16 | End: 2024-05-16 | Stop reason: HOSPADM

## 2024-05-16 RX ORDER — NICOTINE POLACRILEX 4 MG
15 LOZENGE BUCCAL
Status: DISCONTINUED | OUTPATIENT
Start: 2024-05-16 | End: 2024-05-16 | Stop reason: HOSPADM

## 2024-05-16 RX ORDER — INSULIN LISPRO 100 [IU]/ML
2-7 INJECTION, SOLUTION INTRAVENOUS; SUBCUTANEOUS
Status: DISCONTINUED | OUTPATIENT
Start: 2024-05-16 | End: 2024-05-16 | Stop reason: HOSPADM

## 2024-05-16 RX ORDER — DEXTROSE MONOHYDRATE 25 G/50ML
25 INJECTION, SOLUTION INTRAVENOUS
Status: DISCONTINUED | OUTPATIENT
Start: 2024-05-16 | End: 2024-05-16 | Stop reason: HOSPADM

## 2024-05-16 RX ORDER — INDOCYANINE GREEN AND WATER 25 MG
2.5 KIT INJECTION ONCE
OUTPATIENT
Start: 2024-05-16 | End: 2024-05-16

## 2024-05-16 RX ORDER — MORPHINE SULFATE 2 MG/ML
2 INJECTION, SOLUTION INTRAMUSCULAR; INTRAVENOUS
Status: DISCONTINUED | OUTPATIENT
Start: 2024-05-16 | End: 2024-05-16 | Stop reason: HOSPADM

## 2024-05-16 RX ORDER — ATORVASTATIN CALCIUM 20 MG/1
40 TABLET, FILM COATED ORAL DAILY
Status: DISCONTINUED | OUTPATIENT
Start: 2024-05-16 | End: 2024-05-16 | Stop reason: HOSPADM

## 2024-05-16 RX ORDER — ACETAMINOPHEN 650 MG/1
650 SUPPOSITORY RECTAL EVERY 4 HOURS PRN
Status: DISCONTINUED | OUTPATIENT
Start: 2024-05-16 | End: 2024-05-16 | Stop reason: HOSPADM

## 2024-05-16 RX ADMIN — Medication 10 ML: at 09:56

## 2024-05-16 RX ADMIN — Medication 2000 UNITS: at 09:39

## 2024-05-16 RX ADMIN — ATORVASTATIN CALCIUM 40 MG: 20 TABLET, FILM COATED ORAL at 09:39

## 2024-05-16 RX ADMIN — ASPIRIN 81 MG: 81 TABLET, COATED ORAL at 09:39

## 2024-05-16 RX ADMIN — FAMOTIDINE 20 MG: 10 INJECTION INTRAVENOUS at 09:40

## 2024-05-16 RX ADMIN — SODIUM CHLORIDE, POTASSIUM CHLORIDE, SODIUM LACTATE AND CALCIUM CHLORIDE 125 ML/HR: 600; 310; 30; 20 INJECTION, SOLUTION INTRAVENOUS at 04:29

## 2024-05-16 NOTE — PLAN OF CARE
Problem: Adult Inpatient Plan of Care  Goal: Plan of Care Review  Outcome: Ongoing, Progressing  Goal: Patient-Specific Goal (Individualized)  Outcome: Ongoing, Progressing  Goal: Absence of Hospital-Acquired Illness or Injury  Outcome: Ongoing, Progressing  Intervention: Identify and Manage Fall Risk  Recent Flowsheet Documentation  Taken 5/16/2024 0959 by May Pringle, RN  Safety Promotion/Fall Prevention:   fall prevention program maintained   assistive device/personal items within reach   clutter free environment maintained   safety round/check completed   room organization consistent   nonskid shoes/slippers when out of bed  Taken 5/16/2024 0900 by May Pringle, RN  Safety Promotion/Fall Prevention:   fall prevention program maintained   assistive device/personal items within reach   clutter free environment maintained   safety round/check completed   room organization consistent   nonskid shoes/slippers when out of bed  Goal: Optimal Comfort and Wellbeing  Outcome: Ongoing, Progressing  Goal: Readiness for Transition of Care  Outcome: Ongoing, Progressing   Goal Outcome Evaluation:                                               What Type Of Note Output Would You Prefer (Optional)?: Standard Output What Is The Reason For Today's Visit?: Full Body Skin Examination What Is The Reason For Today's Visit? (Being Monitored For X): the development of new lesions

## 2024-05-16 NOTE — CASE MANAGEMENT/SOCIAL WORK
Continued Stay Note  [facility]     Patient Name: Junior Pollard Jr.  MRN: [id number]  Today's Date: 5/16/2024    Admit Date: 5/15/2024    Plan: plans home- CCP will follow   Discharge Plan       Row Name 05/16/24 1047       Plan    Plan plans home- CCP will follow    Patient/Family in Agreement with Plan yes    Plan Comments Spoke with patient at bedside. Introduced self and explained role. Facesheet verified. Patient lives with his wife, Annabelle Pollard [phone], and is [place]. He does drive. His only DME is a glucometer and he does not have a HH or SNF history. At [place], he plans to return home and does not anticipate any needs. CCP will follow.                   Discharge Codes    No documentation.                       Kimmy Mojica RN

## 2024-05-16 NOTE — PLAN OF CARE
Goal Outcome Evaluation:  Plan of Care Reviewed With: patient        Progress: no change     New admission this shift. AO x 4, VSS. Up adlib,Iv fluids infusing. No complains of pain. Stool sp for c diff still pending, All needs met. WCTM.

## 2024-05-16 NOTE — NURSING NOTE
Pt A/O x4, vitals stable, C-diff negative. Surgery came to see pt. Sending home for now and pt will follow up. All belongings with pt, AVS gone over with pt and daughter.     Pt left unit at 1530.

## 2024-05-16 NOTE — CONSULTS
General Surgery H&P/Consultation      Impression/Plan: 68-year-old gentleman admitted to the hospital for abdominal pain.  Symptoms have fully resolved today.  Planning discharge home.  He had mild elevation of his lipase with associated pain.  Ultrasound did demonstrate gallstones.  I discussed with him that this likely represented a very mild episode of gallstone pancreatitis.  Recommended proceeding with cholecystectomy.  He would like to wait a couple weeks as he has a lot of events coming up and does not want to undergo surgery prior to them.  I discussed with him risk of recurrent pancreatitis in the future.  He understands need to proceed with cholecystectomy and we will schedule him as an outpatient.  Recommend low-fat diet on discharge.    I discussed with him the risk and rationale of the procedure.  Plan for robotic cholecystectomy with intraoperative cholangiogram.    CC: Abdominal pain    HPI:   Mr. Junior Pollard Jr. is a 68 y.o. male that presented to the hospital with abdominal pain.  He reports a prior episode of C. difficile and when he developed periumbilical to upper abdominal pain he was concerned that he could be having this again.  He presented to the [facility] emergency room and underwent evaluation.  He was noted to have mildly elevated lipase.  He was admitted for observation.  Today he is tolerating a regular diet without pain.    Past Medical History:   Past Medical History:   Diagnosis Date    Arthritis     Diabetes mellitus     Diabetic eye exam 01/24/2017    DUE    Dislocation of finger 1972    Hyperlipidemia     Hypertension     Knee swelling     Osteoarthritis 03/28/2016       Past Surgical History:   Past Surgical History:   Procedure Laterality Date    FINGER SURGERY Right     Fourth Digit.    HAND SURGERY  1972       Medications:  Medications Prior to Admission   Medication Sig Dispense Refill Last Dose    aspirin (aspirin) 81 MG EC tablet Take 1 tablet by mouth Daily.    5/15/2024    atorvastatin (Lipitor) 40 MG tablet Take 1 tablet by mouth Daily. 90 tablet 1 5/15/2024    dapagliflozin Propanediol (Farxiga) 10 MG tablet Take 10 mg by mouth Daily. 90 tablet 1 5/15/2024    ezetimibe (Zetia) 10 MG tablet Take 1 tablet by mouth Daily. 90 tablet 1 5/15/2024    glimepiride (Amaryl) 2 MG tablet Take 1 tablet by mouth 2 (Two) Times a Day. 180 tablet 1 5/15/2024    lisinopril (PRINIVIL,ZESTRIL) 5 MG tablet Take 1 tablet by mouth Daily. 90 tablet 1 5/15/2024    multivitamin with minerals (MULTIVITAMIN ADULTS 50+ PO) Take 1 tablet by mouth Daily.   5/15/2024    Semaglutide (Rybelsus) 14 MG tablet Take 1 tablet by mouth Daily. 90 tablet 1 5/15/2024    Accu-Chek Softclix Lancets lancets USE AS DIRECTED TO TEST DAILY 100 each 3     Blood Glucose Monitoring Suppl (Accu-Chek Jordyn Plus) w/Device kit Use as directed to check BS daily E11.9 1 kit 0     Cholecalciferol 25 MCG (1000 UT) tablet Take 2 tablets by mouth Daily.       glucose blood (Accu-Chek Jordyn Plus) test strip 1 each by Other route Daily. use to test daily as directed 100 each 3     Insulin Pen Needle 32G X 4 MM misc Use  each 3          Current Facility-Administered Medications:     acetaminophen (TYLENOL) tablet 650 mg, 650 mg, Oral, Q4H PRN **OR** acetaminophen (TYLENOL) 160 MG/5ML oral solution 650 mg, 650 mg, Oral, Q4H PRN **OR** acetaminophen (TYLENOL) suppository 650 mg, 650 mg, Rectal, Q4H PRN, Tierra Pearson APRN    aspirin EC tablet 81 mg, 81 mg, Oral, Daily, Gee Tapia MD, 81 mg at 05/16/24 0939    atorvastatin (LIPITOR) tablet 40 mg, 40 mg, Oral, Daily, Gee Tapia MD, 40 mg at 05/16/24 0939    sennosides-docusate (PERICOLACE) 8.6-50 MG per tablet 2 tablet, 2 tablet, Oral, BID PRN **AND** polyethylene glycol (MIRALAX) packet 17 g, 17 g, Oral, Daily PRN **AND** bisacodyl (DULCOLAX) EC tablet 5 mg, 5 mg, Oral, Daily PRN **AND** bisacodyl (DULCOLAX) suppository 10 mg, 10 mg, Rectal,  Daily PRN, Tierra Pearson APRN    calcium carbonate (TUMS) chewable tablet 500 mg (200 mg elemental), 2 tablet, Oral, BID PRN, Tierra Pearson APRN    cholecalciferol (VITAMIN D3) tablet 2,000 Units, 2,000 Units, Oral, Daily, Gee Tapia MD, 2,000 Units at 05/16/24 0939    dextrose (D50W) (25 g/50 mL) IV injection 25 g, 25 g, Intravenous, Q15 Min PRN, Gee Tapia MD    dextrose (GLUTOSE) oral gel 15 g, 15 g, Oral, Q15 Min PRN, Gee Tapia MD    famotidine (PEPCID) injection 20 mg, 20 mg, Intravenous, Q12H, Tierra Pearson APRN, 20 mg at 05/16/24 0940    glucagon (GLUCAGEN) injection 1 mg, 1 mg, Intramuscular, Q15 Min PRN, Gee Tapia MD    insulin lispro (HUMALOG/ADMELOG) injection 2-7 Units, 2-7 Units, Subcutaneous, 4x Daily AC & at Bedtime, Gee Tapia MD    morphine injection 2 mg, 2 mg, Intravenous, Q2H PRN, Tierra Pearson APRN    ondansetron ODT (ZOFRAN-ODT) disintegrating tablet 4 mg, 4 mg, Oral, Q6H PRN **OR** ondansetron (ZOFRAN) injection 4 mg, 4 mg, Intravenous, Q6H PRN, Tierra Pearson APRN    sodium chloride 0.9 % flush 10 mL, 10 mL, Intravenous, PRN, Chris Yu MD    sodium chloride 0.9 % flush 10 mL, 10 mL, Intravenous, Q12H, Tierra Pearson APRN, 10 mL at 05/16/24 0956    sodium chloride 0.9 % flush 10 mL, 10 mL, Intravenous, PRN, Tierra Pearson APRN    sodium chloride 0.9 % infusion 40 mL, 40 mL, Intravenous, PRN, Tierra Pearson APRN     Allergies: No Known Allergies    Social History:   Social History     Socioeconomic History    Marital status:    Tobacco Use    Smoking status: Never    Smokeless tobacco: Never   Vaping Use    Vaping status: Never Used   Substance and Sexual Activity    Alcohol use: No    Drug use: No    Sexual activity: Yes     Partners: Female       Family History:   Family History   Problem Relation Age of Onset    Heart disease Mother     Diabetes  Mother     Heart disease Father     Hypertension Father     Diabetes Father        Review of Systems:  Clinically relevant review of systems completed and documented per HPI    Physical Exam:   Vitals:    05/16/24 1420   BP: 128/87   Pulse: 60   Resp: 16   Temp: 98.4 °F (36.9 °C)   SpO2: 99%     BMI: Body mass index is 33.63 kg/m².   112 kg (248 lb)      Intake/Output Summary (Last 24 hours) at 5/16/2024 1424  Last data filed at 5/16/2024 1221  Gross per 24 hour   Intake 240 ml   Output --   Net 240 ml       GENERAL: no acute distress, awake and alert  RESPIRATORY: symmetric excursion bilaterally, normal work of breathing  CARDIOVASCULAR: Regular rate, well perfused  GASTROINTESTINAL: Rectus diastases, nontender    Pertinent labs:   Results from last 7 days   Lab Units 05/16/24  0546 05/15/24  2026   WBC 10*3/mm3 8.16 8.85   HEMOGLOBIN g/dL 12.7* 13.8   HEMATOCRIT % 38.3 41.5   PLATELETS 10*3/mm3 225 270     Results from last 7 days   Lab Units 05/16/24  0546 05/15/24  2026   SODIUM mmol/L 140 140   POTASSIUM mmol/L 3.5 4.1   CHLORIDE mmol/L 111* 107   CO2 mmol/L 20.1* 21.7*   BUN mg/dL 26* 29*   CREATININE mg/dL 1.51* 1.77*   CALCIUM mg/dL 9.4 10.1   BILIRUBIN mg/dL 0.7 0.6   ALK PHOS U/L 72 79   ALT (SGPT) U/L 16 18   AST (SGOT) U/L 8 14   GLUCOSE mg/dL 59* 103*       IMAGING:  CT abdomen pelvis reviewed.  There is cholelithiasis.    Ultrasound gallbladder confirming cholelithiasis.          Mckinley Gonzalez MD  General and Endoscopic Surgery  [facility]    [address], [address]  [place] [address]  P: [phone]  F: [phone]

## 2024-05-16 NOTE — FSED PROVIDER NOTE
"Subjective   History of Present Illness  69yo male pmh significant htn/hyperlipidemia/dm2/ckd presents ED c/o 1d hx intermittent periumbilical abdominal \"cramping\" discomfort associated with nonbloody diarrheal stools x2.  ROS neg fever/chills/chest pain/soa/nausea/vomiting/hematemesis/hematochoezia/ melena/dysuria/syncope.    History provided by:  Patient  Abdominal Pain  Associated symptoms: diarrhea    Associated symptoms: no nausea and no vomiting        Review of Systems   Constitutional: Negative.    HENT: Negative.     Eyes: Negative.    Respiratory: Negative.     Cardiovascular: Negative.    Gastrointestinal:  Positive for abdominal pain and diarrhea. Negative for blood in stool, nausea and vomiting.   Genitourinary: Negative.    Allergic/Immunologic: Negative for immunocompromised state.   Neurological: Negative.    All other systems reviewed and are negative.      Past Medical History:   Diagnosis Date    Arthritis     Diabetes mellitus     Diabetic eye exam 01/24/2017    DUE    Dislocation of finger 1972    Hyperlipidemia     Hypertension     Knee swelling     Osteoarthritis 03/28/2016       No Known Allergies    Past Surgical History:   Procedure Laterality Date    FINGER SURGERY Right     [facility] Digit.    HAND SURGERY  1972       Family History   Problem Relation Age of Onset    Heart disease Mother     Diabetes Mother     Heart disease Father     Hypertension Father     Diabetes Father        Social History     Socioeconomic History    Marital status:    Tobacco Use    Smoking status: Never    Smokeless tobacco: Never   Vaping Use    Vaping status: Never Used   Substance and Sexual Activity    Alcohol use: No    Drug use: No    Sexual activity: Yes     Partners: Female           Objective   Physical Exam  Vitals and nursing note reviewed.   Constitutional:       Appearance: Normal appearance. He is obese.   HENT:      Head: Normocephalic and atraumatic.      Right Ear: External ear normal.      " Left Ear: External ear normal.      Nose: Nose normal.      Mouth/Throat:      Mouth: Mucous membranes are moist.      Pharynx: Oropharynx is clear.   Eyes:      Conjunctiva/sclera: Conjunctivae normal.      Pupils: Pupils are equal, round, and reactive to light.   Cardiovascular:      Rate and Rhythm: Normal rate and regular rhythm.      Pulses: Normal pulses.      Heart sounds: Normal heart sounds. No murmur heard.     No friction rub. No gallop.   Pulmonary:      Effort: Pulmonary effort is normal.      Breath sounds: Normal breath sounds. No wheezing, rhonchi or rales.   Abdominal:      General: Abdomen is protuberant. Bowel sounds are normal.      Palpations: Abdomen is soft. There is no mass.      Tenderness: There is no abdominal tenderness. There is no guarding or rebound. Negative signs include Cornejo's sign, Rovsing's sign and McBurney's sign.      Hernia: There is no hernia in the umbilical area or ventral area.   Musculoskeletal:         General: No swelling or deformity.      Cervical back: Normal range of motion and neck supple. No rigidity.   Lymphadenopathy:      Cervical: No cervical adenopathy.   Skin:     General: Skin is warm and dry.   Neurological:      General: No focal deficit present.      Mental Status: He is alert and oriented to person, place, and time.      GCS: GCS eye subscore is 4. GCS verbal subscore is 5. GCS motor subscore is 6.         ECG 12 Lead      Date/Time: 5/15/2024 10:26 PM    Performed by: Chris Yu MD  Authorized by: Chris Yu MD  Interpreted by ED physician  Rhythm: sinus rhythm  Rate: normal  BPM: 62  QRS axis: normal  Conduction: conduction normal  ST Segments: ST segments normal  T Waves: T waves normal  Other: no other findings  Clinical impression: normal ECG               ED Course  ED Course as of 05/16/24 0002   Wed May 15, 2024   2319 A paged for admit [SD]   2941 A paged#2 [SD]   4824 D/w A accepting for Dr. Olson.  Pt stable transport. [SD]       ED Course User Index  [SD] Chris Yu MD      Labs Reviewed   COMPREHENSIVE METABOLIC PANEL - Abnormal; Notable for the following components:       Result Value    Glucose 103 (*)     BUN 29 (*)     Creatinine 1.77 (*)     CO2 21.7 (*)     eGFR 40.8 (*)     All other components within normal limits    Narrative:     GFR Normal >60  Chronic Kidney Disease <60  Kidney Failure <15     LIPASE - Abnormal; Notable for the following components:    Lipase 188 (*)     All other components within normal limits   URINALYSIS W/ MICROSCOPIC IF INDICATED (NO CULTURE) - Abnormal; Notable for the following components:    Glucose, UA >=1000 mg/dL (3+) (*)     All other components within normal limits    Narrative:     Urine microscopic not indicated.   LACTIC ACID, PLASMA - Normal   CBC WITH AUTO DIFFERENTIAL - Normal   LACTATE DEHYDROGENASE   CBC AND DIFFERENTIAL    Narrative:     The following orders were created for panel order CBC & Differential.  Procedure                               Abnormality         Status                     ---------                               -----------         ------                     CBC Auto Differential[[id number]]        Normal              Final result                 Please view results for these tests on the individual orders.     US Abdomen Limited    Result Date: 5/15/2024  Narrative: RIGHT UPPER QUADRANT ULTRASOUND  HISTORY: Cholelithiasis  COMPARISON: May 15, 2024  TECHNIQUE: Grayscale and color Doppler sonographic images were obtained through the right upper quadrant.  FINDINGS: Pancreas cannot be visualized, due to overlying bowel gas. It appears unremarkable on earlier CT. No focal hepatic lesions are seen. The liver measures 16.7 cm in craniocaudal dimensions. There is no intra or extrahepatic biliary dilatation. Common bile duct measures 2 mm. The patient does have a stone identified within the neck of the gallbladder. However, there is no gallbladder wall thickening or  para cholecystic fluid. No sonographic Cornejo sign was elicited. Gallbladder wall measures 3 mm. Right kidney measures 9.3 x 5.3 x 4.4 cm. No hydronephrosis is seen. Renal echotexture appears normal.      Impression: Cholelithiasis.  This report was finalized on 5/15/2024 11:02 PM by Dr. Zenia Neves M.D on Workstation: [id number]      CT Abdomen Pelvis Without Contrast    Result Date: 5/15/2024  Narrative: CT OF THE ABDOMEN PELVIS WITHOUT CONTRAST  HISTORY: Abdominal pain  COMPARISON: June 8, 2021  TECHNIQUE: Axial CT imaging was obtained through the abdomen and pelvis. No IV contrast was administered.  FINDINGS: Images through the lung bases are clear. No suspicious hepatic lesions are seen. The stomach, duodenum, adrenal glands, spleen, and pancreas are normal. There is cholelithiasis. Multiple bilateral parapelvic cysts are noted. No distal ureteral or bladder stones are seen. Prostate gland protrudes into the base of the bladder. There is no bowel obstruction. There are aortoiliac calcifications. The appendix is normal. The patient does have some liquid stool within the ascending and proximal transverse colon, in keeping with history of diarrhea. The remainder of the colon is relatively decompressed. No acute osseous abnormalities are seen.      Impression: Patient does have liquid stool within the ascending and proximal transverse colon, in keeping with history of diarrhea. Otherwise, no acute findings noted within the abdomen or pelvis.  Radiation dose reduction techniques were utilized, including automated exposure control and exposure modulation based on body size.   This report was finalized on 5/15/2024 10:54 PM by Dr. Zenia Neves M.D on Workstation: [id number]      XR Chest 1 View    Result Date: 5/15/2024  Narrative: SINGLE VIEW OF THE CHEST  HISTORY: Nausea and mid abdominal pain  COMPARISON: None available.  FINDINGS: Heart size is within normal limits. No pneumothorax, pleural  effusion, or acute infiltrate is seen.      Impression: No acute findings.  This report was finalized on 5/15/2024 9:52 PM by Dr. Zenia Neves M.D on Workstation: [id number]                                          Medical Decision Making  Problems Addressed:  Acute pancreatitis without infection or necrosis, unspecified pancreatitis type: complicated acute illness or injury  Calculus of gallbladder without cholecystitis without obstruction: complicated acute illness or injury  Diarrhea, unspecified type: complicated acute illness or injury    Amount and/or Complexity of Data Reviewed  Labs: ordered.  Radiology: ordered.  ECG/medicine tests: ordered and independent interpretation performed.    Risk  Prescription drug management.  Decision regarding hospitalization.        Final diagnoses:   Acute pancreatitis without infection or necrosis, unspecified pancreatitis type   Calculus of gallbladder without cholecystitis without obstruction   Diarrhea, unspecified type       ED Disposition  ED Disposition       ED Disposition   Decision to Admit    Condition   --    Comment   Level of Care: Med/Surg [1]   Diagnosis: Acute pancreatitis [577.0.ICD-9-CM]   Admitting Physician: THIEN SAMPSON [[id number]]   Attending Physician: THIEN SAMPSON [[id number]]                 No follow-up provider specified.       Medication List      No changes were made to your prescriptions during this visit.

## 2024-05-16 NOTE — PLAN OF CARE
Problem: Adult Inpatient Plan of Care  Goal: Plan of Care Review  5/16/2024 1520 by May Pringle RN  Outcome: Adequate for Care Transition  5/16/2024 1000 by May Pringle RN  Outcome: Ongoing, Progressing  Goal: Patient-Specific Goal (Individualized)  5/16/2024 1520 by May Pringle RN  Outcome: Adequate for Care Transition  5/16/2024 1000 by May Pringle RN  Outcome: Ongoing, Progressing  Goal: Absence of Hospital-Acquired Illness or Injury  5/16/2024 1520 by May Pringle RN  Outcome: Adequate for Care Transition  5/16/2024 1000 by May Pringle RN  Outcome: Ongoing, Progressing  Intervention: Identify and Manage Fall Risk  Recent Flowsheet Documentation  Taken 5/16/2024 1400 by May Pringle RN  Safety Promotion/Fall Prevention:   fall prevention program maintained   assistive device/personal items within reach   clutter free environment maintained   safety round/check completed   room organization consistent   nonskid shoes/slippers when out of bed  Taken 5/16/2024 1200 by May Pringle RN  Safety Promotion/Fall Prevention:   fall prevention program maintained   assistive device/personal items within reach   clutter free environment maintained   safety round/check completed   room organization consistent   nonskid shoes/slippers when out of bed  Taken 5/16/2024 0959 by May Pringle RN  Safety Promotion/Fall Prevention:   fall prevention program maintained   assistive device/personal items within reach   clutter free environment maintained   safety round/check completed   room organization consistent   nonskid shoes/slippers when out of bed  Taken 5/16/2024 0900 by May Pringle RN  Safety Promotion/Fall Prevention:   fall prevention program maintained   assistive device/personal items within reach   clutter free environment maintained   safety round/check completed   room organization consistent   nonskid shoes/slippers when out of bed  Intervention: Prevent Skin Injury  Recent Flowsheet Documentation  Taken  5/16/2024 1400 by May Pringle RN  Body Position: (pt independent) weight shifting  Taken 5/16/2024 1200 by May Pringle RN  Body Position: weight shifting  Goal: Optimal Comfort and Wellbeing  5/16/2024 1520 by May Pringle RN  Outcome: Adequate for Care Transition  5/16/2024 1000 by May Pringle RN  Outcome: Ongoing, Progressing  Goal: Readiness for Transition of Care  5/16/2024 1520 by May Pringle RN  Outcome: Adequate for Care Transition  5/16/2024 1000 by May Pringle RN  Outcome: Ongoing, Progressing   Goal Outcome Evaluation:

## 2024-05-16 NOTE — DISCHARGE SUMMARY
[facility] Medicine Services  DISCHARGE SUMMARY    Patient Name: Junior Pollard Jr.  : 1955  MRN: [id number]    Date of Admission: 5/15/2024  8:00 PM  Date of Discharge:   2024  Primary Care Physician: Boogie Interiano MD    Consults       Date and Time Order Name Status Description    2024  1:56 PM Inpatient General Surgery Consult Completed             Hospital Course       Active Hospital Problems    Diagnosis  POA    **Acute pancreatitis [K85.90]  Yes    Gall bladder stones [K80.20]  Yes    Abdominal pain [R10.9]  Yes    Uncontrolled type 2 diabetes mellitus with hyperglycemia [E11.65]  Yes    Benign essential hypertension [I10]  Yes    Stage 3b chronic kidney disease [N18.32]  Yes    Hypercholesterolemia [E78.00]  Yes    Chronic coronary artery disease [I25.10]  Yes    Type 2 diabetes with nephropathy [E11.21]  Yes    Diabetes mellitus with renal complications [E11.29]  Yes      Resolved Hospital Problems   No resolved problems to display.      Chief Complaint:  Abdominal pain     HPI:  Junior Pollard Jr. is a 68 y.o. male with past medical history diabetes chronic kidney disease, and multiple other medical problems as listed presents to the hospital with 1 day history of cramping upper abdominal pain that is worse with palpation and described as moderate in nature.  Patient did have 2 loose bowel movements following this episode.  He did have nausea he reports.  He denies any chest pain or shortness of breath or fevers or chills.  His daughter is at the bedside and states that she had similar symptoms when she had her gallbladder removed.  Patient is feeling much better and wanting to start on a diet.  He received IV fluids at the outside emergency room and was found to have lipase concerning for possible mild pancreatitis or gallstone pancreatitis.       Hospital Course:  Junior Pollard Jr. is a 68 y.o. male the hospital with abdominal pain is found to have elevated lipase and  cholelithiasis and has been placed in observation for further monitoring.       Pancreatitis:  Patient received IV fluids.  Symptomatic treatment and supportive care.  Symptoms have improved drastically.  I suspect he passed a gallstone.  I discussed my suspicion for gallstone pancreatitis with surgery team who agrees.  They evaluated him consult and will see him on an outpatient basis     Cholelithiasis:  Case discussed with general surgery team over the phone.  I will consult them to evaluate.  He does not appear to have acute gallbladder wall thickening on ultrasound but does have multiple stones.  He will be at risk for further cases of pancreatitis and likely will need to get his gallbladder out at some point.  Surgery offered possible cholecystectomy on an inpatient basis but patient preferred to have it on an outpatient basis.  They will follow-up with surgery team in clinic.  Abdominal pain is currently resolved.  I recommend he stay off his Rybelsus for now as this can also cause stomach pain and speak to his primary care     Diabetes: Rybelsus discontinued.  Perhaps he is a good candidate for Januvia or Tradjenta.  I suppose he has renal function is borderline for metformin however perhaps he could tolerate metformin 500 mg extended release once daily without any issue.  At this point I recommend he work on diet and exercise and follow-up with primary care for further titration     CKD: Near baseline.  Trend renal function.  Monitor volume status.     Hypertension: Stable discharge     History of CAD: No chest pain or cardiac symptoms reported.     Patient is now tolerating diet.  I have instructed him to discharge from a bland low-fat diet and follow-up with primary care provider in the very near future.  He will call today to schedule appointment he states and discussed the various issues.  He also will follow-up with surgery.  He was very eager to discharge    At the time of discharge patient was told  "to take all medications as prescribed, keep all follow-up appointments, and call their doctor or return to the hospital with any worsening or concerning symptoms.    Please note that this note was made using Dragon voice recognition software            Day of Discharge     Subjective: Feels much better.  Tolerating diet.  Agrees to stick to low-fat diet and follow-up with primary care and surgery he is very eager to go home    Vital Signs:   Temp:  [97.4 °F (36.3 °C)-98.4 °F (36.9 °C)] 98.4 °F (36.9 °C)  Heart Rate:  [53-65] 60  Resp:  [16-18] 16  BP: (124-143)/(60-87) 128/87     Physical Exam:    Constitutional: Awake, alert  Eyes: PERRLA, sclerae anicteric, no conjunctival injection  HENT: NCAT, mucous membranes moist  Neck: Supple, no thyromegaly, no lymphadenopathy, trachea midline  Respiratory: No cough or wheezes, normal inspiration, nonlabored respirations   Cardiovascular: Pulse rate is normal, palpable radial pulses bilaterally  Gastrointestinal:  Soft, no current tenderness, nondistended  Musculoskeletal:  Normal musculature for age, no lower extremity edema, BMI 33  Psychiatric: Appropriate affect, cooperative, conversational  Neurologic: No slurred speech or facial droop, follows commands  Skin: No rashes or jaundice, warm    Pertinent  and/or Most Recent Results     Results from last 7 days   Lab Units 05/16/24  0546 05/15/24  2026   WBC 10*3/mm3 8.16 8.85   HEMOGLOBIN g/dL 12.7* 13.8   HEMATOCRIT % 38.3 41.5   PLATELETS 10*3/mm3 225 270   SODIUM mmol/L 140 140   POTASSIUM mmol/L 3.5 4.1   CHLORIDE mmol/L 111* 107   CO2 mmol/L 20.1* 21.7*   BUN mg/dL 26* 29*   CREATININE mg/dL 1.51* 1.77*   GLUCOSE mg/dL 59* 103*   CALCIUM mg/dL 9.4 10.1     Results from last 7 days   Lab Units 05/16/24  0546 05/15/24  2026   BILIRUBIN mg/dL 0.7 0.6   ALK PHOS U/L 72 79   ALT (SGPT) U/L 16 18   AST (SGOT) U/L 8 14           Invalid input(s): \"TG\", \"LDLCALC\", \"LDLREALC\"  Results from last 7 days   Lab Units " 05/15/24  2026   LACTATE mmol/L 0.8       Brief Urine Lab Results  (Last result in the past 365 days)        Color   Clarity   Blood   Leuk Est   Nitrite   Protein   CREAT   Urine HCG        05/15/24 2235 Yellow   Clear   Negative   Negative   Negative   Negative                   Microbiology Results Abnormal       Procedure Component Value - Date/Time    Gastrointestinal Panel, PCR - Stool, Per Rectum [[id number]]  (Normal) Collected: 05/16/24 1205    Lab Status: Final result Specimen: Stool from Per Rectum Updated: 05/16/24 1335     Campylobacter Not Detected     Plesiomonas shigelloides Not Detected     Salmonella Not Detected     Vibrio Not Detected     Vibrio cholerae Not Detected     Yersinia enterocolitica Not Detected     Enteroaggregative E. coli (EAEC) Not Detected     Enteropathogenic E. coli (EPEC) Not Detected     Enterotoxigenic E. coli (ETEC) lt/st Not Detected     Shiga-like toxin-producing E. coli (STEC) stx1/stx2 Not Detected     Shigella/Enteroinvasive E. coli (EIEC) Not Detected     Cryptosporidium Not Detected     Cyclospora cayetanensis Not Detected     Entamoeba histolytica Not Detected     Giardia lamblia Not Detected     Adenovirus F40/41 Not Detected     Astrovirus Not Detected     Norovirus GI/GII Not Detected     Rotavirus A Not Detected     Sapovirus (I, II, IV or V) Not Detected    Narrative:      If Aeromonas, Staphylococcus aureus or Bacillus cereus are suspected, please order [id number]: Stool Culture, Aeromonas, S aureus, B Cereus.    Clostridioides difficile Toxin - Stool, Per Rectum [[id number]]  (Normal) Collected: 05/16/24 1205    Lab Status: Final result Specimen: Stool from Per Rectum Updated: 05/16/24 1305    Narrative:      The following orders were created for panel order Clostridioides difficile Toxin - Stool, Per Rectum.  Procedure                               Abnormality         Status                     ---------                               -----------         ------                      Clostridioides difficile...[[id number]]  Normal              Final result                 Please view results for these tests on the individual orders.    Clostridioides difficile Toxin, PCR - Stool, Per Rectum [[id number]]  (Normal) Collected: 05/16/24 1205    Lab Status: Final result Specimen: Stool from Per Rectum Updated: 05/16/24 1305     Toxigenic C. difficile by PCR Negative    Narrative:      The result indicates the absence of toxigenic C. difficile from stool specimen.             Imaging Results (All)       Procedure Component Value Units Date/Time    US Abdomen Limited [[id number]] Collected: 05/15/24 2301     Updated: 05/15/24 2305    Narrative:      RIGHT UPPER QUADRANT ULTRASOUND     HISTORY: Cholelithiasis     COMPARISON: May 15, 2024     TECHNIQUE: Grayscale and color Doppler sonographic images were obtained  through the right upper quadrant.     FINDINGS:  Pancreas cannot be visualized, due to overlying bowel gas. It appears  unremarkable on earlier CT. No focal hepatic lesions are seen. The liver  measures 16.7 cm in craniocaudal dimensions. There is no intra or  extrahepatic biliary dilatation. Common bile duct measures 2 mm. The  patient does have a stone identified within the neck of the gallbladder.  However, there is no gallbladder wall thickening or para cholecystic  fluid. No sonographic Cornejo sign was elicited. Gallbladder wall  measures 3 mm. Right kidney measures 9.3 x 5.3 x 4.4 cm. No  hydronephrosis is seen. Renal echotexture appears normal.       Impression:      Cholelithiasis.     This report was finalized on 5/15/2024 11:02 PM by Dr. Zenia Neves M.D on Workstation: [id number]       CT Abdomen Pelvis Without Contrast [[id number]] Collected: 05/15/24 2248     Updated: 05/15/24 2257    Narrative:      CT OF THE ABDOMEN PELVIS WITHOUT CONTRAST     HISTORY: Abdominal pain     COMPARISON: June 8, 2021     TECHNIQUE: Axial CT imaging was obtained through the abdomen  and pelvis.  No IV contrast was administered.     FINDINGS:  Images through the lung bases are clear. No suspicious hepatic lesions  are seen. The stomach, duodenum, adrenal glands, spleen, and pancreas  are normal. There is cholelithiasis. Multiple bilateral parapelvic cysts  are noted. No distal ureteral or bladder stones are seen. Prostate gland  protrudes into the base of the bladder. There is no bowel obstruction.  There are aortoiliac calcifications. The appendix is normal. The patient  does have some liquid stool within the ascending and proximal transverse  colon, in keeping with history of diarrhea. The remainder of the colon  is relatively decompressed. No acute osseous abnormalities are seen.       Impression:      Patient does have liquid stool within the ascending and proximal  transverse colon, in keeping with history of diarrhea. Otherwise, no  acute findings noted within the abdomen or pelvis.     Radiation dose reduction techniques were utilized, including automated  exposure control and exposure modulation based on body size.        This report was finalized on 5/15/2024 10:54 PM by Dr. Zenia Neves M.D on Workstation: [id number]       XR Chest 1 View [[id number]] Collected: 05/15/24 2151     Updated: 05/15/24 2155    Narrative:      SINGLE VIEW OF THE CHEST     HISTORY: Nausea and mid abdominal pain     COMPARISON: None available.     FINDINGS:  Heart size is within normal limits. No pneumothorax, pleural effusion,  or acute infiltrate is seen.       Impression:      No acute findings.     This report was finalized on 5/15/2024 9:52 PM by Dr. Zenia Neves M.D on Workstation: [id number]                     Discharge Details        Discharge Medications        New Medications        Instructions Start Date   acetaminophen 325 MG tablet  Commonly known as: TYLENOL   650 mg, Oral, Every 6 Hours PRN      ondansetron ODT 4 MG disintegrating tablet  Commonly known as: ZOFRAN-ODT   4 mg,  Oral, Every 8 Hours PRN             Continue These Medications        Instructions Start Date   Accu-Chek Jordyn Plus test strip  Generic drug: glucose blood   1 each, Other, Daily, use to test daily as directed      Accu-Chek Jordyn Plus w/Device kit   Use as directed to check BS daily E11.9      Accu-Chek Softclix Lancets lancets   USE AS DIRECTED TO TEST DAILY      aspirin 81 MG EC tablet   81 mg, Oral, Daily      atorvastatin 40 MG tablet  Commonly known as: Lipitor   40 mg, Oral, Daily      cholecalciferol 25 MCG (1000 UT) tablet  Commonly known as: VITAMIN D3   2,000 Units, Oral, Daily      dapagliflozin Propanediol 10 MG tablet  Commonly known as: Farxiga   10 mg, Oral, Daily      ezetimibe 10 MG tablet  Commonly known as: Zetia   10 mg, Oral, Daily      glimepiride 2 MG tablet  Commonly known as: Amaryl   2 mg, Oral, 2 Times Daily - RT      Insulin Pen Needle 32G X 4 MM misc   Use QD      lisinopril 5 MG tablet  Commonly known as: PRINIVIL,ZESTRIL   5 mg, Oral, Daily      multivitamin with minerals tablet tablet   1 tablet, Oral, Daily             Stop These Medications      Rybelsus 14 MG tablet  Generic drug: Semaglutide              No Known Allergies      Discharge Disposition:  Home or Self Care    Diet:  Hospital:  Diet Order   Procedures    Diet: Regular/House, Diabetic, Gastrointestinal; Consistent Carbohydrate; Low Irritant, Fat-Restricted; Texture: Regular (IDDSI 7); Fluid Consistency: Thin (IDDSI 0)       Activity:           CODE STATUS:    Code Status and Medical Interventions:   Ordered at: 05/16/24 0356     Code Status (Patient has no pulse and is not breathing):    CPR (Attempt to Resuscitate)     Medical Interventions (Patient has pulse or is breathing):    Full Support       Future Appointments   Date Time Provider Department Center   7/25/2024  7:45 AM Boogie Interiano MD [id number]           Additional Instructions for the Follow-ups that You Need to Schedule       Discharge Follow-up  with PCP   As directed       Currently Documented PCP:    Boogie Interiano MD    PCP Phone Number:    [phone]     Follow Up Details: 1 week, call today.               Follow-up Information       Boogie Interiano MD .    Specialty: Family Medicine  Why: 1 week, call today.  Contact information:  [address]  [facility] [id number] [phone]               Mckinley Gonzalez MD Follow up in 1 month(s).    Specialty: General Surgery  Contact information:  [address]  [facility] [id number] [phone]                                 Gee Tapia MD  05/16/24      Time Spent on Discharge:  I spent greater than 30 minutes on this discharge activity which included: face-to-face encounter with the patient, reviewing the data in the system, coordination of the care with the nursing staff as well as consultants, documentation, and entering orders.

## 2024-05-16 NOTE — H&P
[facility] Medicine Services  HISTORY AND PHYSICAL    Patient Name: Junior Pollard Jr.  : 1955  MRN: [id number]  Primary Care Physician: Boogie Interiano MD  Date of admission: 5/15/2024    Subjective   Subjective   Chief Complaint:  Abdominal pain    HPI:  Junior Pollard Jr. is a 68 y.o. male with past medical history diabetes chronic kidney disease, and multiple other medical problems as listed presents to the hospital with 1 day history of cramping upper abdominal pain that is worse with palpation and described as moderate in nature.  Patient did have 2 loose bowel movements following this episode.  He did have nausea he reports.  He denies any chest pain or shortness of breath or fevers or chills.  His daughter is at the bedside and states that she had similar symptoms when she had her gallbladder removed.  Patient is feeling much better and wanting to start on a diet.  He received IV fluids at the outside emergency room and was found to have lipase concerning for possible mild pancreatitis or gallstone pancreatitis.      Review of Systems   No current fevers or chills  No current shortness of breath or cough  No current nausea, vomiting, or diarrhea  No current chest pain or palpitations  All other systems reviewed and are negative.     Personal History     Past Medical History:   Diagnosis Date    Arthritis     Diabetes mellitus     Diabetic eye exam 2017    DUE    Dislocation of finger     Hyperlipidemia     Hypertension     Knee swelling     Osteoarthritis 2016       Past Surgical History:   Procedure Laterality Date    FINGER SURGERY Right     Fourth Digit.    HAND SURGERY         Family History: family history includes Diabetes in his father and mother; Heart disease in his father and mother; Hypertension in his father. Other pertinent FHx was reviewed and unremarkable.     Social History:  reports that he has never smoked. He has never used smokeless tobacco. He  reports that he does not drink alcohol and does not use drugs.        Medications:  Available home medication information reviewed.    No Known Allergies    Objective   Objective   Vital Signs:   Temp:  [97.4 °F (36.3 °C)-98.3 °F (36.8 °C)] 98.3 °F (36.8 °C)  Heart Rate:  [53-65] 53  Resp:  [16-18] 16  BP: (124-143)/(60-72) 130/70        Physical Exam   Constitutional: Awake, alert  Eyes: PERRLA, sclerae anicteric, no conjunctival injection  HENT: NCAT, mucous membranes moist  Neck: Supple, no thyromegaly, no lymphadenopathy, trachea midline  Respiratory: No cough or wheezes, normal inspiration, nonlabored respirations   Cardiovascular: Pulse rate is normal, palpable radial pulses bilaterally  Gastrointestinal:  Soft, + tender, nondistended  Musculoskeletal:  Normal musculature for age, no lower extremity edema, BMI 33  Psychiatric: Appropriate affect, cooperative, conversational  Neurologic: No slurred speech or facial droop, follows commands  Skin: No rashes or jaundice, warm      Results from last 7 days   Lab Units 05/16/24  0546   WBC 10*3/mm3 8.16   HEMOGLOBIN g/dL 12.7*   HEMATOCRIT % 38.3   PLATELETS 10*3/mm3 225     Results from last 7 days   Lab Units 05/16/24  0546 05/15/24  2026   SODIUM mmol/L 140 140   POTASSIUM mmol/L 3.5 4.1   CHLORIDE mmol/L 111* 107   CO2 mmol/L 20.1* 21.7*   BUN mg/dL 26* 29*   CREATININE mg/dL 1.51* 1.77*   GLUCOSE mg/dL 59* 103*   CALCIUM mg/dL 9.4 10.1   ALK PHOS U/L 72 79   ALT (SGPT) U/L 16 18   AST (SGOT) U/L 8 14   LACTATE mmol/L  --  0.8     Estimated Creatinine Clearance: 58.8 mL/min (A) (by C-G formula based on SCr of 1.51 mg/dL (H)).  Brief Urine Lab Results  (Last result in the past 365 days)        Color   Clarity   Blood   Leuk Est   Nitrite   Protein   CREAT   Urine HCG        05/15/24 2235 Yellow   Clear   Negative   Negative   Negative   Negative                 Imaging Results (Last 24 Hours)       Procedure Component Value Units Date/Time    US Abdomen Limited  [[id number]] Collected: 05/15/24 2301     Updated: 05/15/24 2305    Narrative:      RIGHT UPPER QUADRANT ULTRASOUND     HISTORY: Cholelithiasis     COMPARISON: May 15, 2024     TECHNIQUE: Grayscale and color Doppler sonographic images were obtained  through the right upper quadrant.     FINDINGS:  Pancreas cannot be visualized, due to overlying bowel gas. It appears  unremarkable on earlier CT. No focal hepatic lesions are seen. The liver  measures 16.7 cm in craniocaudal dimensions. There is no intra or  extrahepatic biliary dilatation. Common bile duct measures 2 mm. The  patient does have a stone identified within the neck of the gallbladder.  However, there is no gallbladder wall thickening or para cholecystic  fluid. No sonographic Cornejo sign was elicited. Gallbladder wall  measures 3 mm. Right kidney measures 9.3 x 5.3 x 4.4 cm. No  hydronephrosis is seen. Renal echotexture appears normal.       Impression:      Cholelithiasis.     This report was finalized on 5/15/2024 11:02 PM by Dr. Zenia Neves M.D on Workstation: [id number]       CT Abdomen Pelvis Without Contrast [[id number]] Collected: 05/15/24 2248     Updated: 05/15/24 2257    Narrative:      CT OF THE ABDOMEN PELVIS WITHOUT CONTRAST     HISTORY: Abdominal pain     COMPARISON: June 8, 2021     TECHNIQUE: Axial CT imaging was obtained through the abdomen and pelvis.  No IV contrast was administered.     FINDINGS:  Images through the lung bases are clear. No suspicious hepatic lesions  are seen. The stomach, duodenum, adrenal glands, spleen, and pancreas  are normal. There is cholelithiasis. Multiple bilateral parapelvic cysts  are noted. No distal ureteral or bladder stones are seen. Prostate gland  protrudes into the base of the bladder. There is no bowel obstruction.  There are aortoiliac calcifications. The appendix is normal. The patient  does have some liquid stool within the ascending and proximal transverse  colon, in keeping with history  of diarrhea. The remainder of the colon  is relatively decompressed. No acute osseous abnormalities are seen.       Impression:      Patient does have liquid stool within the ascending and proximal  transverse colon, in keeping with history of diarrhea. Otherwise, no  acute findings noted within the abdomen or pelvis.     Radiation dose reduction techniques were utilized, including automated  exposure control and exposure modulation based on body size.        This report was finalized on 5/15/2024 10:54 PM by Dr. Zenia Neves M.D on Workstation: [email or web address]       XR Chest 1 View [[id number]] Collected: 05/15/24 2151     Updated: 05/15/24 2155    Narrative:      SINGLE VIEW OF THE CHEST     HISTORY: Nausea and mid abdominal pain     COMPARISON: None available.     FINDINGS:  Heart size is within normal limits. No pneumothorax, pleural effusion,  or acute infiltrate is seen.       Impression:      No acute findings.     This report was finalized on 5/15/2024 9:52 PM by Dr. Zenia Neves M.D on Workstation: [id number]                 Assessment & Plan   Assessment & Plan     Active Hospital Problems    Diagnosis  POA    **Acute pancreatitis [K85.90]  Yes    Gall bladder stones [K80.20]  Yes    Abdominal pain [R10.9]  Yes    Uncontrolled type 2 diabetes mellitus with hyperglycemia [E11.65]  Yes    Benign essential hypertension [I10]  Yes    Stage 3b chronic kidney disease [N18.32]  Yes     Description: estimated glomerular filtration rate of 55 in November 2012. Creatinine level November  November 2012 1.57, March 2013 1.35. [place] diet started March 2013. Creatinine June 2013 0.63. April 2014 creatinine 1.5. Normal renal ultrasound. Patient  continued NSAIDs. Follows with Dr Bravo every 6 months.      Hypercholesterolemia [E78.00]  Yes    Chronic coronary artery disease [I25.10]  Yes     Description: November 2014 stress nuclear test with inferior hypokinesis. Cardiac catheterization Dr. Russo  Semder LAD 30-40% midsegment stenosis, RCA mid diffuse 30% stenosis.      Type 2 diabetes with nephropathy [E11.21]  Yes    Diabetes mellitus with renal complications [E11.29]  Yes     Description: Dx 1998. Most recent hemoglobin A1c 04/18/2013 is 6.6%., and the A/C . He follows once a year with Dr. Ovalles for eye care.       68-year-old presents the hospital with abdominal pain is found to have elevated lipase and cholelithiasis and has been placed in observation for further monitoring.      Pancreatitis:  Patient received IV fluids.  Symptomatic treatment and supportive care.  Symptoms have improved drastically.  I suspect he passed a gallstone.    Cholelithiasis:  Case discussed with general surgery team over the phone.  I will consult them to evaluate.  He does not appear to have acute gallbladder wall thickening on ultrasound but does have multiple stones.  He will be at risk for further cases of pancreatitis and likely will need to get his gallbladder out at some point.    Diabetes: Monitor glucose and adjust insulin as needed.    CKD: Near baseline.  Trend renal function.  Monitor volume status.    Hypertension: Continue home blood pressure medication and adjust blood pressure medications as needed.    History of CAD: No chest pain or cardiac symptoms reported.    Treatment plan discussed with patient who is in agreement.      CODE STATUS:    Code Status and Medical Interventions:   Ordered at: 05/16/24 0356     Code Status (Patient has no pulse and is not breathing):    CPR (Attempt to Resuscitate)     Medical Interventions (Patient has pulse or is breathing):    Full Support         Gee Tapia MD  05/16/24

## 2024-05-16 NOTE — OUTREACH NOTE
Prep Survey      Flowsheet Row Responses   [facility] patient discharged from? [place]   Is LACE score < 7 ? Yes   Eligibility [facility]   Date of Admission 05/15/24   Date of Discharge 05/16/24   Discharge Disposition Home or Self Care   Discharge diagnosis Acute pancreatitis   Does the patient have one of the following disease processes/diagnoses(primary or secondary)? Other   Does the patient have Home health ordered? No   Is there a DME ordered? No   Prep survey completed? Yes            Shoshana CARD - Registered Nurse

## 2024-05-16 NOTE — NURSING NOTE
Pt A/O x4, vitals stable. Blood sugar 57 this morning but pt asymptomatic. Had some juice and ordering breakfast. PT was NPO over night, diet now changed to regular. Daughter at bedside. No complaints of pain at this time. Abdomen soft/non tender. Last BM 5/15 before admission to this unit.

## 2024-05-17 ENCOUNTER — TRANSITIONAL CARE MANAGEMENT TELEPHONE ENCOUNTER (OUTPATIENT)
Dept: CALL CENTER | Facility: HOSPITAL | Age: 69
End: 2024-05-17
Payer: MEDICARE

## 2024-05-17 NOTE — OUTREACH NOTE
Call Center TCM Note      Flowsheet Row Responses   [facility] patient discharged from? [place]   Does the patient have one of the following disease processes/diagnoses(primary or secondary)? Other   TCM attempt successful? No  [VR from 2018 has no names listed]   Unsuccessful attempts Attempt 1  [attempted cell and home numbers]   Call Status Left message            Adelaide Odonnell RN    5/17/2024, 10:48 EDT

## 2024-05-17 NOTE — OUTREACH NOTE
Call Center TCM Note      Flowsheet Row Responses   [facility] patient discharged from? [place]   Does the patient have one of the following disease processes/diagnoses(primary or secondary)? Other   TCM attempt successful? No   Unsuccessful attempts Attempt 2  [VR list only patient]            Suzette Tolliver RN    5/17/2024, 15:58 EDT

## 2024-05-17 NOTE — CASE MANAGEMENT/SOCIAL WORK
Case Management Discharge Note      Final Note: home no needs         Selected Continued Care - Discharged on 5/16/2024 Admission date: 5/15/2024 - Discharge disposition: Home or Self Care      Destination    No services have been selected for the patient.                Durable Medical Equipment    No services have been selected for the patient.                Dialysis/Infusion    No services have been selected for the patient.                Home Medical Care    No services have been selected for the patient.                Therapy    No services have been selected for the patient.                Community Resources    No services have been selected for the patient.                Community & DME    No services have been selected for the patient.                    Transportation Services  Private: Car    Final Discharge Disposition Code: 01 - home or self-care

## 2024-05-19 ENCOUNTER — TRANSITIONAL CARE MANAGEMENT TELEPHONE ENCOUNTER (OUTPATIENT)
Dept: CALL CENTER | Facility: HOSPITAL | Age: 69
End: 2024-05-19
Payer: MEDICARE

## 2024-05-19 NOTE — OUTREACH NOTE
Call Center TCM Note      Flowsheet Row Responses   [facility] [place] patient discharged from? [place]   Does the patient have one of the following disease processes/diagnoses(primary or secondary)? Other   TCM attempt successful? No   Unsuccessful attempts Attempt 3            Miladis Naranjo RN    5/19/2024, 10:41 EDT

## 2024-06-24 ENCOUNTER — PRE-ADMISSION TESTING (OUTPATIENT)
Dept: PREADMISSION TESTING | Facility: HOSPITAL | Age: 69
End: 2024-06-24
Payer: MEDICARE

## 2024-06-24 VITALS
OXYGEN SATURATION: 99 % | RESPIRATION RATE: 18 BRPM | BODY MASS INDEX: 34.65 KG/M2 | DIASTOLIC BLOOD PRESSURE: 69 MMHG | SYSTOLIC BLOOD PRESSURE: 138 MMHG | HEART RATE: 85 BPM | WEIGHT: 242 LBS | TEMPERATURE: 98.9 F | HEIGHT: 70 IN

## 2024-06-24 LAB
ANION GAP SERPL CALCULATED.3IONS-SCNC: 11.2 MMOL/L (ref 5–15)
BUN SERPL-MCNC: 26 MG/DL (ref 8–23)
BUN/CREAT SERPL: 14.4 (ref 7–25)
CALCIUM SPEC-SCNC: 9.2 MG/DL (ref 8.6–10.5)
CHLORIDE SERPL-SCNC: 106 MMOL/L (ref 98–107)
CO2 SERPL-SCNC: 23.8 MMOL/L (ref 22–29)
CREAT SERPL-MCNC: 1.8 MG/DL (ref 0.76–1.27)
DEPRECATED RDW RBC AUTO: 40 FL (ref 37–54)
EGFRCR SERPLBLD CKD-EPI 2021: 40 ML/MIN/1.73
ERYTHROCYTE [DISTWIDTH] IN BLOOD BY AUTOMATED COUNT: 12.1 % (ref 12.3–15.4)
GLUCOSE SERPL-MCNC: 231 MG/DL (ref 65–99)
HCT VFR BLD AUTO: 39.9 % (ref 37.5–51)
HGB BLD-MCNC: 13.2 G/DL (ref 13–17.7)
MCH RBC QN AUTO: 30.3 PG (ref 26.6–33)
MCHC RBC AUTO-ENTMCNC: 33.1 G/DL (ref 31.5–35.7)
MCV RBC AUTO: 91.5 FL (ref 79–97)
PLATELET # BLD AUTO: 251 10*3/MM3 (ref 140–450)
PMV BLD AUTO: 9.8 FL (ref 6–12)
POTASSIUM SERPL-SCNC: 4.4 MMOL/L (ref 3.5–5.2)
RBC # BLD AUTO: 4.36 10*6/MM3 (ref 4.14–5.8)
SODIUM SERPL-SCNC: 141 MMOL/L (ref 136–145)
WBC NRBC COR # BLD AUTO: 8 10*3/MM3 (ref 3.4–10.8)

## 2024-06-24 PROCEDURE — 36415 COLL VENOUS BLD VENIPUNCTURE: CPT

## 2024-06-24 PROCEDURE — 80048 BASIC METABOLIC PNL TOTAL CA: CPT

## 2024-06-24 PROCEDURE — 85027 COMPLETE CBC AUTOMATED: CPT

## 2024-06-24 RX ORDER — ORAL SEMAGLUTIDE 14 MG/1
1 TABLET ORAL DAILY
COMMUNITY

## 2024-06-24 NOTE — DISCHARGE INSTRUCTIONS

## 2024-07-02 ENCOUNTER — ANESTHESIA (OUTPATIENT)
Dept: PERIOP | Facility: HOSPITAL | Age: 69
End: 2024-07-02
Payer: MEDICARE

## 2024-07-02 ENCOUNTER — ANESTHESIA EVENT (OUTPATIENT)
Dept: PERIOP | Facility: HOSPITAL | Age: 69
End: 2024-07-02
Payer: MEDICARE

## 2024-07-02 ENCOUNTER — HOSPITAL ENCOUNTER (OUTPATIENT)
Facility: HOSPITAL | Age: 69
Setting detail: HOSPITAL OUTPATIENT SURGERY
Discharge: HOME | End: 2024-07-02
Attending: SURGERY | Admitting: SURGERY
Payer: MEDICARE

## 2024-07-02 ENCOUNTER — APPOINTMENT (OUTPATIENT)
Dept: GENERAL RADIOLOGY | Facility: HOSPITAL | Age: 69
End: 2024-07-02
Payer: MEDICARE

## 2024-07-02 VITALS
TEMPERATURE: 98.3 F | SYSTOLIC BLOOD PRESSURE: 125 MMHG | RESPIRATION RATE: 18 BRPM | OXYGEN SATURATION: 96 % | HEART RATE: 71 BPM | DIASTOLIC BLOOD PRESSURE: 55 MMHG

## 2024-07-02 DIAGNOSIS — K85.10 ACUTE BILIARY PANCREATITIS WITHOUT INFECTION OR NECROSIS: ICD-10-CM

## 2024-07-02 LAB
GLUCOSE BLDC GLUCOMTR-MCNC: 160 MG/DL (ref 70–130)
GLUCOSE BLDC GLUCOMTR-MCNC: 289 MG/DL (ref 70–130)

## 2024-07-02 PROCEDURE — 25010000002 INDOCYANINE GREEN 25 MG RECONSTITUTED SOLUTION: Performed by: SURGERY

## 2024-07-02 PROCEDURE — 74300 X-RAY BILE DUCTS/PANCREAS: CPT

## 2024-07-02 PROCEDURE — S2900 ROBOTIC SURGICAL SYSTEM: HCPCS | Performed by: SURGERY

## 2024-07-02 PROCEDURE — 0 IOTHALAMATE 60 % SOLUTION: Performed by: SURGERY

## 2024-07-02 PROCEDURE — 25010000002 CEFOXITIN PER 1 G: Performed by: SURGERY

## 2024-07-02 PROCEDURE — 25010000002 HYDROMORPHONE PER 4 MG: Performed by: NURSE ANESTHETIST, CERTIFIED REGISTERED

## 2024-07-02 PROCEDURE — 82948 REAGENT STRIP/BLOOD GLUCOSE: CPT

## 2024-07-02 PROCEDURE — 88304 TISSUE EXAM BY PATHOLOGIST: CPT | Performed by: SURGERY

## 2024-07-02 PROCEDURE — 25010000002 FENTANYL CITRATE (PF) 50 MCG/ML SOLUTION: Performed by: NURSE ANESTHETIST, CERTIFIED REGISTERED

## 2024-07-02 PROCEDURE — 25810000003 LACTATED RINGERS PER 1000 ML: Performed by: ANESTHESIOLOGY

## 2024-07-02 PROCEDURE — 25010000002 ONDANSETRON PER 1 MG: Performed by: NURSE ANESTHETIST, CERTIFIED REGISTERED

## 2024-07-02 PROCEDURE — 47563 LAPARO CHOLECYSTECTOMY/GRAPH: CPT | Performed by: SURGERY

## 2024-07-02 PROCEDURE — 25010000002 PROPOFOL 200 MG/20ML EMULSION: Performed by: NURSE ANESTHETIST, CERTIFIED REGISTERED

## 2024-07-02 PROCEDURE — 47563 LAPARO CHOLECYSTECTOMY/GRAPH: CPT | Performed by: PHYSICIAN ASSISTANT

## 2024-07-02 PROCEDURE — 25010000002 FENTANYL CITRATE (PF) 50 MCG/ML SOLUTION PREFILLED SYRINGE: Performed by: NURSE ANESTHETIST, CERTIFIED REGISTERED

## 2024-07-02 PROCEDURE — 25010000002 DEXAMETHASONE SODIUM PHOSPHATE 20 MG/5ML SOLUTION: Performed by: NURSE ANESTHETIST, CERTIFIED REGISTERED

## 2024-07-02 PROCEDURE — 25810000003 LACTATED RINGERS PER 1000 ML: Performed by: SURGERY

## 2024-07-02 PROCEDURE — 25010000002 SUGAMMADEX 200 MG/2ML SOLUTION: Performed by: NURSE ANESTHETIST, CERTIFIED REGISTERED

## 2024-07-02 PROCEDURE — C1758 CATHETER, URETERAL: HCPCS | Performed by: SURGERY

## 2024-07-02 PROCEDURE — 25010000002 MIDAZOLAM PER 1 MG: Performed by: ANESTHESIOLOGY

## 2024-07-02 DEVICE — MEDIUM-LARGE LIGATION CLIPS 6 CLIPS/CART
Type: IMPLANTABLE DEVICE | Site: ABDOMEN | Status: FUNCTIONAL
Brand: VAS-Q-CLIP

## 2024-07-02 RX ORDER — INDOCYANINE GREEN AND WATER 25 MG
2.5 KIT INJECTION ONCE
Status: COMPLETED | OUTPATIENT
Start: 2024-07-02 | End: 2024-07-02

## 2024-07-02 RX ORDER — MAGNESIUM HYDROXIDE 1200 MG/15ML
LIQUID ORAL AS NEEDED
Status: DISCONTINUED | OUTPATIENT
Start: 2024-07-02 | End: 2024-07-02 | Stop reason: HOSPADM

## 2024-07-02 RX ORDER — FENTANYL CITRATE 50 UG/ML
INJECTION, SOLUTION INTRAMUSCULAR; INTRAVENOUS AS NEEDED
Status: DISCONTINUED | OUTPATIENT
Start: 2024-07-02 | End: 2024-07-02 | Stop reason: SURG

## 2024-07-02 RX ORDER — SODIUM CHLORIDE 0.9 % (FLUSH) 0.9 %
10 SYRINGE (ML) INJECTION AS NEEDED
Status: DISCONTINUED | OUTPATIENT
Start: 2024-07-02 | End: 2024-07-02 | Stop reason: HOSPADM

## 2024-07-02 RX ORDER — HYDRALAZINE HYDROCHLORIDE 20 MG/ML
5 INJECTION INTRAMUSCULAR; INTRAVENOUS
Status: DISCONTINUED | OUTPATIENT
Start: 2024-07-02 | End: 2024-07-02 | Stop reason: HOSPADM

## 2024-07-02 RX ORDER — IPRATROPIUM BROMIDE AND ALBUTEROL SULFATE 2.5; .5 MG/3ML; MG/3ML
3 SOLUTION RESPIRATORY (INHALATION) ONCE AS NEEDED
Status: DISCONTINUED | OUTPATIENT
Start: 2024-07-02 | End: 2024-07-02 | Stop reason: HOSPADM

## 2024-07-02 RX ORDER — HYDROMORPHONE HYDROCHLORIDE 1 MG/ML
0.25 INJECTION, SOLUTION INTRAMUSCULAR; INTRAVENOUS; SUBCUTANEOUS
Status: DISCONTINUED | OUTPATIENT
Start: 2024-07-02 | End: 2024-07-02 | Stop reason: HOSPADM

## 2024-07-02 RX ORDER — HYDROCODONE BITARTRATE AND ACETAMINOPHEN 7.5; 325 MG/1; MG/1
1 TABLET ORAL EVERY 4 HOURS PRN
Status: DISCONTINUED | OUTPATIENT
Start: 2024-07-02 | End: 2024-07-02 | Stop reason: HOSPADM

## 2024-07-02 RX ORDER — DROPERIDOL 2.5 MG/ML
0.62 INJECTION, SOLUTION INTRAMUSCULAR; INTRAVENOUS
Status: DISCONTINUED | OUTPATIENT
Start: 2024-07-02 | End: 2024-07-02 | Stop reason: HOSPADM

## 2024-07-02 RX ORDER — PROMETHAZINE HYDROCHLORIDE 25 MG/1
25 TABLET ORAL ONCE AS NEEDED
Status: DISCONTINUED | OUTPATIENT
Start: 2024-07-02 | End: 2024-07-02 | Stop reason: HOSPADM

## 2024-07-02 RX ORDER — TRAMADOL HYDROCHLORIDE 50 MG/1
50 TABLET ORAL EVERY 6 HOURS PRN
Qty: 12 TABLET | Refills: 0 | Status: SHIPPED | OUTPATIENT
Start: 2024-07-02

## 2024-07-02 RX ORDER — PROPOFOL 10 MG/ML
INJECTION, EMULSION INTRAVENOUS AS NEEDED
Status: DISCONTINUED | OUTPATIENT
Start: 2024-07-02 | End: 2024-07-02 | Stop reason: SURG

## 2024-07-02 RX ORDER — SODIUM CHLORIDE 0.9 % (FLUSH) 0.9 %
3 SYRINGE (ML) INJECTION EVERY 12 HOURS SCHEDULED
Status: DISCONTINUED | OUTPATIENT
Start: 2024-07-02 | End: 2024-07-02 | Stop reason: HOSPADM

## 2024-07-02 RX ORDER — FLUMAZENIL 0.1 MG/ML
0.2 INJECTION INTRAVENOUS AS NEEDED
Status: DISCONTINUED | OUTPATIENT
Start: 2024-07-02 | End: 2024-07-02 | Stop reason: HOSPADM

## 2024-07-02 RX ORDER — LABETALOL HYDROCHLORIDE 5 MG/ML
5 INJECTION, SOLUTION INTRAVENOUS
Status: DISCONTINUED | OUTPATIENT
Start: 2024-07-02 | End: 2024-07-02 | Stop reason: HOSPADM

## 2024-07-02 RX ORDER — SODIUM CHLORIDE, SODIUM LACTATE, POTASSIUM CHLORIDE, CALCIUM CHLORIDE 600; 310; 30; 20 MG/100ML; MG/100ML; MG/100ML; MG/100ML
9 INJECTION, SOLUTION INTRAVENOUS CONTINUOUS
Status: DISCONTINUED | OUTPATIENT
Start: 2024-07-02 | End: 2024-07-02 | Stop reason: HOSPADM

## 2024-07-02 RX ORDER — PROMETHAZINE HYDROCHLORIDE 25 MG/1
25 SUPPOSITORY RECTAL ONCE AS NEEDED
Status: DISCONTINUED | OUTPATIENT
Start: 2024-07-02 | End: 2024-07-02 | Stop reason: HOSPADM

## 2024-07-02 RX ORDER — FENTANYL CITRATE 50 UG/ML
50 INJECTION, SOLUTION INTRAMUSCULAR; INTRAVENOUS ONCE AS NEEDED
Status: DISCONTINUED | OUTPATIENT
Start: 2024-07-02 | End: 2024-07-02 | Stop reason: HOSPADM

## 2024-07-02 RX ORDER — SODIUM CHLORIDE 0.9 % (FLUSH) 0.9 %
3-10 SYRINGE (ML) INJECTION AS NEEDED
Status: DISCONTINUED | OUTPATIENT
Start: 2024-07-02 | End: 2024-07-02 | Stop reason: HOSPADM

## 2024-07-02 RX ORDER — NALOXONE HCL 0.4 MG/ML
0.2 VIAL (ML) INJECTION AS NEEDED
Status: DISCONTINUED | OUTPATIENT
Start: 2024-07-02 | End: 2024-07-02 | Stop reason: HOSPADM

## 2024-07-02 RX ORDER — DEXAMETHASONE SODIUM PHOSPHATE 4 MG/ML
INJECTION, SOLUTION INTRA-ARTICULAR; INTRALESIONAL; INTRAMUSCULAR; INTRAVENOUS; SOFT TISSUE AS NEEDED
Status: DISCONTINUED | OUTPATIENT
Start: 2024-07-02 | End: 2024-07-02 | Stop reason: SURG

## 2024-07-02 RX ORDER — ACETAMINOPHEN 500 MG
1000 TABLET ORAL ONCE
Status: COMPLETED | OUTPATIENT
Start: 2024-07-02 | End: 2024-07-02

## 2024-07-02 RX ORDER — FAMOTIDINE 10 MG/ML
20 INJECTION, SOLUTION INTRAVENOUS ONCE
Status: COMPLETED | OUTPATIENT
Start: 2024-07-02 | End: 2024-07-02

## 2024-07-02 RX ORDER — MIDAZOLAM HYDROCHLORIDE 1 MG/ML
0.5 INJECTION INTRAMUSCULAR; INTRAVENOUS
Status: DISCONTINUED | OUTPATIENT
Start: 2024-07-02 | End: 2024-07-02 | Stop reason: HOSPADM

## 2024-07-02 RX ORDER — ONDANSETRON 2 MG/ML
INJECTION INTRAMUSCULAR; INTRAVENOUS AS NEEDED
Status: DISCONTINUED | OUTPATIENT
Start: 2024-07-02 | End: 2024-07-02 | Stop reason: SURG

## 2024-07-02 RX ORDER — FENTANYL CITRATE 50 UG/ML
25 INJECTION, SOLUTION INTRAMUSCULAR; INTRAVENOUS
Status: DISCONTINUED | OUTPATIENT
Start: 2024-07-02 | End: 2024-07-02 | Stop reason: HOSPADM

## 2024-07-02 RX ORDER — LIDOCAINE HYDROCHLORIDE 10 MG/ML
0.5 INJECTION, SOLUTION INFILTRATION; PERINEURAL ONCE AS NEEDED
Status: DISCONTINUED | OUTPATIENT
Start: 2024-07-02 | End: 2024-07-02 | Stop reason: HOSPADM

## 2024-07-02 RX ORDER — HYDROCODONE BITARTRATE AND ACETAMINOPHEN 5; 325 MG/1; MG/1
1 TABLET ORAL ONCE AS NEEDED
Status: DISCONTINUED | OUTPATIENT
Start: 2024-07-02 | End: 2024-07-02 | Stop reason: HOSPADM

## 2024-07-02 RX ORDER — EPHEDRINE SULFATE 50 MG/ML
5 INJECTION, SOLUTION INTRAVENOUS ONCE AS NEEDED
Status: DISCONTINUED | OUTPATIENT
Start: 2024-07-02 | End: 2024-07-02 | Stop reason: HOSPADM

## 2024-07-02 RX ORDER — BUPIVACAINE HYDROCHLORIDE AND EPINEPHRINE 5; 5 MG/ML; UG/ML
INJECTION, SOLUTION EPIDURAL; INTRACAUDAL; PERINEURAL AS NEEDED
Status: DISCONTINUED | OUTPATIENT
Start: 2024-07-02 | End: 2024-07-02 | Stop reason: HOSPADM

## 2024-07-02 RX ORDER — DIPHENHYDRAMINE HYDROCHLORIDE 50 MG/ML
12.5 INJECTION INTRAMUSCULAR; INTRAVENOUS
Status: DISCONTINUED | OUTPATIENT
Start: 2024-07-02 | End: 2024-07-02 | Stop reason: HOSPADM

## 2024-07-02 RX ORDER — ONDANSETRON 2 MG/ML
4 INJECTION INTRAMUSCULAR; INTRAVENOUS ONCE AS NEEDED
Status: DISCONTINUED | OUTPATIENT
Start: 2024-07-02 | End: 2024-07-02 | Stop reason: HOSPADM

## 2024-07-02 RX ORDER — LIDOCAINE HYDROCHLORIDE 20 MG/ML
INJECTION, SOLUTION INFILTRATION; PERINEURAL AS NEEDED
Status: DISCONTINUED | OUTPATIENT
Start: 2024-07-02 | End: 2024-07-02 | Stop reason: SURG

## 2024-07-02 RX ORDER — ROCURONIUM BROMIDE 10 MG/ML
INJECTION, SOLUTION INTRAVENOUS AS NEEDED
Status: DISCONTINUED | OUTPATIENT
Start: 2024-07-02 | End: 2024-07-02 | Stop reason: SURG

## 2024-07-02 RX ORDER — SODIUM CHLORIDE, SODIUM LACTATE, POTASSIUM CHLORIDE, CALCIUM CHLORIDE 600; 310; 30; 20 MG/100ML; MG/100ML; MG/100ML; MG/100ML
1000 INJECTION, SOLUTION INTRAVENOUS CONTINUOUS
Status: DISCONTINUED | OUTPATIENT
Start: 2024-07-02 | End: 2024-07-02 | Stop reason: HOSPADM

## 2024-07-02 RX ADMIN — SODIUM CHLORIDE, POTASSIUM CHLORIDE, SODIUM LACTATE AND CALCIUM CHLORIDE 9 ML/HR: 600; 310; 30; 20 INJECTION, SOLUTION INTRAVENOUS at 06:59

## 2024-07-02 RX ADMIN — SODIUM CHLORIDE, POTASSIUM CHLORIDE, SODIUM LACTATE AND CALCIUM CHLORIDE 1000 ML: 600; 310; 30; 20 INJECTION, SOLUTION INTRAVENOUS at 06:59

## 2024-07-02 RX ADMIN — HYDROMORPHONE HYDROCHLORIDE 0.25 MG: 1 INJECTION, SOLUTION INTRAMUSCULAR; INTRAVENOUS; SUBCUTANEOUS at 09:34

## 2024-07-02 RX ADMIN — ROCURONIUM BROMIDE 10 MG: 10 INJECTION, SOLUTION INTRAVENOUS at 08:20

## 2024-07-02 RX ADMIN — CEFOXITIN SODIUM 2000 MG: 2 POWDER, FOR SOLUTION INTRAVENOUS at 07:22

## 2024-07-02 RX ADMIN — DEXAMETHASONE SODIUM PHOSPHATE 4 MG: 4 INJECTION, SOLUTION INTRAMUSCULAR; INTRAVENOUS at 07:50

## 2024-07-02 RX ADMIN — SUGAMMADEX 200 MG: 100 INJECTION, SOLUTION INTRAVENOUS at 08:58

## 2024-07-02 RX ADMIN — FENTANYL CITRATE 50 MCG: 50 INJECTION, SOLUTION INTRAMUSCULAR; INTRAVENOUS at 09:06

## 2024-07-02 RX ADMIN — INDOCYANINE GREEN AND WATER 2.5 MG: KIT at 06:18

## 2024-07-02 RX ADMIN — FENTANYL CITRATE 25 MCG: 50 INJECTION, SOLUTION INTRAMUSCULAR; INTRAVENOUS at 09:22

## 2024-07-02 RX ADMIN — LIDOCAINE HYDROCHLORIDE 100 MG: 20 INJECTION, SOLUTION INFILTRATION; PERINEURAL at 07:35

## 2024-07-02 RX ADMIN — MIDAZOLAM 0.5 MG: 1 INJECTION INTRAMUSCULAR; INTRAVENOUS at 06:59

## 2024-07-02 RX ADMIN — FENTANYL CITRATE 25 MCG: 50 INJECTION, SOLUTION INTRAMUSCULAR; INTRAVENOUS at 09:17

## 2024-07-02 RX ADMIN — HYDROCODONE BITARTRATE AND ACETAMINOPHEN 1 TABLET: 7.5; 325 TABLET ORAL at 09:53

## 2024-07-02 RX ADMIN — ONDANSETRON 4 MG: 2 INJECTION INTRAMUSCULAR; INTRAVENOUS at 07:50

## 2024-07-02 RX ADMIN — ACETAMINOPHEN 1000 MG: 500 TABLET ORAL at 06:18

## 2024-07-02 RX ADMIN — FAMOTIDINE 20 MG: 10 INJECTION INTRAVENOUS at 06:59

## 2024-07-02 RX ADMIN — SUGAMMADEX 200 MG: 100 INJECTION, SOLUTION INTRAVENOUS at 09:00

## 2024-07-02 RX ADMIN — HYDROMORPHONE HYDROCHLORIDE 0.25 MG: 1 INJECTION, SOLUTION INTRAMUSCULAR; INTRAVENOUS; SUBCUTANEOUS at 09:27

## 2024-07-02 RX ADMIN — FENTANYL CITRATE 50 MCG: 50 INJECTION, SOLUTION INTRAMUSCULAR; INTRAVENOUS at 08:03

## 2024-07-02 RX ADMIN — ROCURONIUM BROMIDE 50 MG: 10 INJECTION, SOLUTION INTRAVENOUS at 07:35

## 2024-07-02 RX ADMIN — PROPOFOL 150 MG: 10 INJECTION, EMULSION INTRAVENOUS at 07:35

## 2024-07-02 RX ADMIN — FENTANYL CITRATE 50 MCG: 50 INJECTION, SOLUTION INTRAMUSCULAR; INTRAVENOUS at 07:30

## 2024-07-02 NOTE — H&P
General Surgery H&P/Consultation      Impression/Plan: 68-year-old gentleman with history of mild gallstone pancreatitis.  He has cholelithiasis and I recommended proceeding to the operating room for robotic assisted cholecystectomy with intraoperative cholangiogram.  Risk and rationale for the procedure were discussed with him and he was in agreement to proceed.    CC: Abdominal pain    HPI:   Mr. Junior Pollard Jr. is a 68 y.o. male that presented to the hospital for scheduled cholecystectomy.  He was admitted in May 2024 for abdominal pain and was noted to have a mild pancreatitis.  He was noted to have cholelithiasis as well.  It was recommended he undergo cholecystectomy to prevent future episodes of gallstone pancreatitis.  He denies any further abdominal pain, but his daughter reports several episodes of abdominal pain since the hospital that were not severe in nature.    Past Medical History:  Past Medical History:   Diagnosis Date    Arthritis     Diabetes mellitus     Diabetic eye exam 01/24/2017    DUE    Dislocation of finger 1972    Gallstones     History of Clostridium difficile infection 2021    Hyperlipidemia     Hypertension     Knee swelling     Osteoarthritis 03/28/2016    Skin cancer     LEFT EAR    Stage 3 chronic kidney disease        Past Surgical History:  Past Surgical History:   Procedure Laterality Date    CARDIAC CATHETERIZATION      CATARACT EXTRACTION, BILATERAL      FINGER SURGERY Right     Fourth Digit.    HAND SURGERY Right 1972    RING FINGER    TONSILLECTOMY         Medications:  Medications Prior to Admission   Medication Sig Dispense Refill Last Dose    atorvastatin (Lipitor) 40 MG tablet Take 1 tablet by mouth Daily. 90 tablet 1 7/1/2024 at 0800    dapagliflozin Propanediol (Farxiga) 10 MG tablet Take 10 mg by mouth Daily. 90 tablet 1 7/1/2024 at 0800    ezetimibe (Zetia) 10 MG tablet Take 1 tablet by mouth Daily. 90 tablet 1 7/1/2024 at 0800    Ferrous Sulfate (IRON PO) Take  65 mg by mouth Daily. PT HOLDING FOR SURGERY   6/25/2024    glimepiride (Amaryl) 2 MG tablet Take 1 tablet by mouth 2 (Two) Times a Day. 180 tablet 1 7/1/2024 at 1600    lisinopril (PRINIVIL,ZESTRIL) 5 MG tablet Take 1 tablet by mouth Daily. 90 tablet 1 7/1/2024 at 0800    Accu-Chek Softclix Lancets lancets USE AS DIRECTED TO TEST DAILY 100 each 3     aspirin (aspirin) 81 MG EC tablet Take 1 tablet by mouth Daily. PT HOLDING FOR SURGERY   6/25/2024    Blood Glucose Monitoring Suppl (Accu-Chek Jordyn Plus) w/Device kit Use as directed to check BS daily E11.9 1 kit 0     Cholecalciferol 25 MCG (1000 UT) tablet Take 2 tablets by mouth Daily. PT HOLDING FOR SURGERY   6/25/2024    glucose blood (Accu-Chek Jordyn Plus) test strip 1 each by Other route Daily. use to test daily as directed 100 each 3     Insulin Pen Needle 32G X 4 MM misc Use  each 3     multivitamin with minerals (MULTIVITAMIN ADULTS 50+ PO) Take 1 tablet by mouth Daily. PT HOLDING FOR SURGERY   6/25/2024    ondansetron ODT (ZOFRAN-ODT) 4 MG disintegrating tablet Take 1 tablet by mouth Every 8 (Eight) Hours As Needed for Nausea or Vomiting. 10 tablet 0     Semaglutide (Rybelsus) 14 MG tablet Take 1 tablet by mouth Daily. PT HOLDING FOR SURGERY   6/25/2024         Current Facility-Administered Medications:     cefOXItin (MEFOXIN) 2,000 mg in sodium chloride 0.9 % 100 mL MBP, 2,000 mg, Intravenous, 30 Min Pre-Op, Mckinley Gonzalez MD     Allergies: No Known Allergies    Social History:  Social History     Socioeconomic History    Marital status:    Tobacco Use    Smoking status: Never    Smokeless tobacco: Never   Vaping Use    Vaping status: Never Used   Substance and Sexual Activity    Alcohol use: No    Drug use: No    Sexual activity: Yes     Partners: Female       Family History:   Family History   Problem Relation Age of Onset    Heart disease Mother     Diabetes Mother     Heart disease Father     Hypertension Father     Diabetes Father      Malig Hyperthermia Neg Hx          Physical Exam:   Vitals:    07/02/24 0557   BP: 143/63   Pulse: 70   Resp: 16   Temp: 99 °F (37.2 °C)   SpO2: 98%     BMI: There is no height or weight on file to calculate BMI.   No Weight Documented This Encounter    No intake or output data in the 24 hours ending 07/02/24 0643    GENERAL: no acute distress, awake and alert  RESPIRATORY: symmetric excursion bilaterally, normal work of breathing  CARDIOVASCULAR: Regular rate, well perfused  GASTROINTESTINAL: Soft, obese, nontender        IMAGING:  Ultrasound from 5/15/2024 demonstrating cholelithiasis          Mckinley Gonzalez MD  General and Endoscopic Surgery  [facility]    [address], [address]  [place] [address]  P: [phone]  F: [phone]

## 2024-07-02 NOTE — ANESTHESIA PROCEDURE NOTES
Airway  Urgency: elective    Date/Time: 7/2/2024 7:36 AM  End Time:7/2/2024 7:37 AM  Airway not difficult    General Information and Staff    Patient location during procedure: OR  Anesthesiologist: Sharee Soler MD  CRNA/CAA: Keeley Painter CRNA    Indications and Patient Condition  Indications for airway management: airway protection    Preoxygenated: yes  MILS maintained throughout  Mask difficulty assessment: 3 - difficult mask (inadequate, unstable or two providers) +/- NMBA    Final Airway Details  Final airway type: endotracheal airway      Successful airway: ETT  Cuffed: yes   Successful intubation technique: video laryngoscopy  Facilitating devices/methods: intubating stylet  Endotracheal tube insertion site: oral  Blade: Zuñiga  Blade size: 3  ETT size (mm): 7.5  Cormack-Lehane Classification: grade I - full view of glottis  Placement verified by: chest auscultation and capnometry   Cuff volume (mL): 7  Measured from: lips  ETT/EBT  to lips (cm): 22  Number of attempts at approach: 1  Assessment: lips, teeth, and gum same as pre-op and atraumatic intubation

## 2024-07-02 NOTE — DISCHARGE INSTRUCTIONS
POST OP RECOMMENDATIONS  Dr. Mckinley Gonzalez  [facility], [place], operated by [facility]  [phone]  Discharge Gallbladder Surgery    Go home, rest and take it easy today; however, you should get up and move about several times today to reduce the risk of developing a blood clot in your legs.   You may experience some dizziness or memory loss from the anesthesia. This may last for the next 24 hours. Someone should plan on staying with you for the first 24 hours for your safety.   Do not make any important legal decisions or sign any legal papers for the next 24 hours.   Eat and drink lightly today. Start off with bland foods at first. You may advance your diet tomorrow as tolerated as long as you do not experience any nausea or vomiting.   Your incisions are covered with skin glue.  This will peel off on its own in 1-2 weeks. If it falls off sooner then that is ok.   You may notice some bleeding/drainage on your outer dressings. A little bloody drainage is normal. If the bleeding/drainage is such that the bandage cannot absorb it, remove the dressing, apply clean gauze and apply firm pressure for a full 15 minutes. If the bleeding continues, please call me.   You may shower tomorrow. No tub baths until your incisions are completely healed.   You have received a prescription for a narcotic pain medicine, as you will have some pain following surgery.  You will not be totally pain free, but your pain medicine should make the pain tolerable. Please take your pain medicine as prescribed and always take your pills with food to prevent nausea. If you are having severe pain that cannot be controlled by the pain medicine, please contact me.  It is not unusual to experience pain/discomfort in your shoulders or your ribs after surgery. It is from the gas used during the laparoscopic procedure and usually lasts 1-3 days. The prescription pain medicine is used to treat the surgical pain and does not typically alleviate this “gassy” pain.    No driving for 24 hours and for as long as you are taking your prescription pain medicine.   You will need to call the office at [phone] to schedule a follow-up appointment in 6-10 days.   Remember to contact me for any of the following:   Fever > 101 degrees  Severe pain that cannot be controlled by taking your pain pills  Severe nausea or vomiting that cannot be controlled by taking your nausea pills  Significant bleeding of your incisions  Drainage that has a bad smell or is yellow or green in appearance  Any other questions or concerns

## 2024-07-02 NOTE — ANESTHESIA PREPROCEDURE EVALUATION
Anesthesia Evaluation                  Airway   Mallampati: I  TM distance: >3 FB  Neck ROM: full  No difficulty expected  Dental      Comment: Missing teeth but he denies any are lose    Pulmonary    Cardiovascular     ECG reviewed    (+) hypertension less than 2 medications, CAD, hyperlipidemia  (-) past MI, angina, CHF, cardiac stents      Neuro/Psych  GI/Hepatic/Renal/Endo    (+) renal disease- CRI, diabetes mellitus type 2 using insulin    Musculoskeletal     Abdominal    Substance History      OB/GYN          Other   arthritis,   history of cancer                  Anesthesia Plan    ASA 3     general     intravenous induction     Anesthetic plan, risks, benefits, and alternatives have been provided, discussed and informed consent has been obtained with: patient.    CODE STATUS:

## 2024-07-02 NOTE — ANESTHESIA POSTPROCEDURE EVALUATION
Patient: Junior Pollard Jr.    Procedure Summary       Date: 07/02/24 Room / Location: [facility] OR [address] [place] [address] MAIN OR    Anesthesia Start: 0727 Anesthesia Stop: 0913    Procedure: Robotic assisted cholecystectomy with possible intraoperative cholangiogram (Abdomen) Diagnosis:       Acute biliary pancreatitis without infection or necrosis      (Acute biliary pancreatitis without infection or necrosis [K85.10])    Surgeons: Mckinley Gonzalez MD Provider: Sharee Soler MD    Anesthesia Type: general ASA Status: 3            Anesthesia Type: general    Vitals  Vitals Value Taken Time   /52 07/02/24 1015   Temp 36.8 °C (98.3 °F) 07/02/24 0910   Pulse 70 07/02/24 1015   Resp 16 07/02/24 1015   SpO2 95 % 07/02/24 1015           Post Anesthesia Care and Evaluation    Patient location during evaluation: bedside  Patient participation: complete - patient participated  Level of consciousness: awake  Pain management: adequate    Airway patency: patent  Anesthetic complications: No anesthetic complications    Cardiovascular status: acceptable  Respiratory status: acceptable  Hydration status: acceptable    Comments: /55 (BP Location: Right arm, Patient Position: Lying)   Pulse 71   Temp 36.8 °C (98.3 °F) (Oral)   Resp 18   SpO2 96%

## 2024-07-03 LAB
LAB AP CASE REPORT: NORMAL
PATH REPORT.FINAL DX SPEC: NORMAL
PATH REPORT.GROSS SPEC: NORMAL

## 2024-07-12 DIAGNOSIS — E11.21 TYPE 2 DIABETES WITH NEPHROPATHY: Chronic | ICD-10-CM

## 2024-07-12 RX ORDER — DAPAGLIFLOZIN 10 MG/1
10 TABLET, FILM COATED ORAL DAILY
Qty: 30 TABLET | Refills: 0 | Status: SHIPPED | OUTPATIENT
Start: 2024-07-12

## 2024-07-12 RX ORDER — GLIMEPIRIDE 2 MG/1
2 TABLET ORAL
Qty: 60 TABLET | Refills: 1 | Status: SHIPPED | OUTPATIENT
Start: 2024-07-12

## 2024-07-17 ENCOUNTER — OFFICE VISIT (OUTPATIENT)
Dept: SURGERY | Facility: CLINIC | Age: 69
End: 2024-07-17
Payer: MEDICARE

## 2024-07-17 VITALS
SYSTOLIC BLOOD PRESSURE: 132 MMHG | BODY MASS INDEX: 34.01 KG/M2 | DIASTOLIC BLOOD PRESSURE: 60 MMHG | WEIGHT: 237.6 LBS | HEIGHT: 70 IN

## 2024-07-17 DIAGNOSIS — K85.10 ACUTE BILIARY PANCREATITIS WITHOUT INFECTION OR NECROSIS: Primary | ICD-10-CM

## 2024-07-17 PROCEDURE — 3075F SYST BP GE 130 - 139MM HG: CPT | Performed by: SURGERY

## 2024-07-17 PROCEDURE — 3078F DIAST BP <80 MM HG: CPT | Performed by: SURGERY

## 2024-07-17 PROCEDURE — 1160F RVW MEDS BY RX/DR IN RCRD: CPT | Performed by: SURGERY

## 2024-07-17 PROCEDURE — 1159F MED LIST DOCD IN RCRD: CPT | Performed by: SURGERY

## 2024-07-17 PROCEDURE — 99024 POSTOP FOLLOW-UP VISIT: CPT | Performed by: SURGERY

## 2024-07-22 PROBLEM — K80.20 GALL BLADDER STONES: Status: RESOLVED | Noted: 2021-06-10 | Resolved: 2024-07-22

## 2024-07-22 PROBLEM — R10.9 ABDOMINAL PAIN: Status: RESOLVED | Noted: 2020-10-09 | Resolved: 2024-07-22

## 2024-07-22 PROBLEM — K85.90 ACUTE PANCREATITIS: Status: RESOLVED | Noted: 2024-05-16 | Resolved: 2024-07-22

## 2024-07-22 NOTE — PROGRESS NOTES
ASSESSMENT/PLAN:    68-year-old gentleman status post robotic assisted cholecystectomy with intraoperative cholangiogram for gallstone pancreatitis and chronic cholecystitis.  Pathology was reviewed with him which demonstrated chronic cholecystitis and cholelithiasis.  Incisions are healing well and there is no evidence of hernia.  He may continue a diet as tolerated.  He may resume activity as tolerated.  I will see him back on an as-needed basis.    I discussed with him colon cancer screening and the benefits of colonoscopy.  He defers colonoscopy at this time but I encouraged him to fill out a direct colonoscopy packet should he change his mind and we will get him scheduled.         CC:     Chief Complaint   Patient presents with    Post-op     Robotic assisted cholecystectomy with intraoperative cholangiogram 07/02        HPI:    He reports overall he is doing well.  Denies any significant complaints.      LABS:        Results from last 7 days   Lab Units 07/18/24  0822   SODIUM mmol/L 142   POTASSIUM mmol/L 4.8   CHLORIDE mmol/L 108*   CO2 mmol/L 24.6   BUN mg/dL 22   CREATININE mg/dL 1.65*   CALCIUM mg/dL 9.5   GLUCOSE mg/dL 133*       SOCIAL HISTORY:   Social Determinants of Health     Tobacco Use: Low Risk  (7/17/2024)    Patient History     Smoking Tobacco Use: Never     Smokeless Tobacco Use: Never     Passive Exposure: Not on file   Alcohol Use: Not At Risk (5/16/2024)    AUDIT-C     Frequency of Alcohol Consumption: Never     Average Number of Drinks: Patient does not drink     Frequency of Binge Drinking: Never   Financial Resource Strain: Not on file   Food Insecurity: Not on file   Transportation Needs: Not on file   Physical Activity: Not on file   Stress: Not on file   Social Connections: Unknown (10/10/2023)    Family and Community Support     Help with Day-to-Day Activities: Not on file     Lonely or Isolated: Not on file   Interpersonal Safety: Not At Risk (7/2/2024)    Abuse Screen     Unsafe at  Home or Work/School: no     Feels Threatened by Someone?: no     Does Anyone Keep You from Contacting Others or Doint Things Outside the Home?: no     Physical Sign of Abuse Present: no   Depression: Not at risk (1/24/2024)    PHQ-2     PHQ-2 Score: 0   Housing Stability: Not At Risk (6/24/2024)    Housing Stability     Current Living Arrangements: home     Potentially Unsafe Housing Conditions: none   Utilities: Not on file   Health Literacy: Unknown (5/16/2024)    Education     Help with school or training?: Not on file     Preferred Language: English   Employment: Unknown (10/10/2023)    Employment     Do you want help finding or keeping work or a job?: Not on file   Disabilities: Not At Risk (6/24/2024)    Disabilities     Concentrating, Remembering, or Making Decisions Difficulty: no     Doing Errands Independently Difficulty: no        FAMILY HISTORY:    Family History   Problem Relation Age of Onset    Heart disease Mother     Diabetes Mother     Heart disease Father     Hypertension Father     Diabetes Father     Malig Hyperthermia Neg Hx         OTHER SURGERY:  Past Surgical History:   Procedure Laterality Date    CARDIAC CATHETERIZATION      CATARACT EXTRACTION, BILATERAL      CHOLECYSTECTOMY N/A 07/02/2024    Procedure: Robotic assisted cholecystectomy with possible intraoperative cholangiogram;  Surgeon: Mckinley Gonzalez MD;  Location: [facility];  Service: Robotics - [facility];  Laterality: N/A;    EYE SURGERY      FINGER SURGERY Right     Fourth Digit.    HAND SURGERY Right 1972    RING FINGER    TONSILLECTOMY          PAST MEDICAL HISTORY:    Past Medical History:   Diagnosis Date    Abnormal ECG     Arthritis     Diabetes mellitus     Diabetic eye exam 01/24/2017    DUE    Dislocation of finger 1972    Gallstones     History of Clostridium difficile infection 2021    Hyperlipidemia     Hypertension     Knee swelling     Osteoarthritis 03/28/2016    Skin cancer     LEFT EAR    Stage 3 chronic  "kidney disease         MEDICATIONS:   Current Outpatient Medications on File Prior to Visit   Medication Sig Dispense Refill    Accu-Chek Softclix Lancets lancets USE AS DIRECTED TO TEST DAILY 100 each 3    aspirin (aspirin) 81 MG EC tablet Take 1 tablet by mouth Daily. PT HOLDING FOR SURGERY      atorvastatin (Lipitor) 40 MG tablet Take 1 tablet by mouth Daily. 90 tablet 1    Blood Glucose Monitoring Suppl (Accu-Chek Jordyn Plus) w/Device kit Use as directed to check BS daily E11.9 1 kit 0    Cholecalciferol 25 MCG (1000 UT) tablet Take 2 tablets by mouth Daily. PT HOLDING FOR SURGERY      dapagliflozin Propanediol (Farxiga) 10 MG tablet Take 10 mg by mouth Daily. 30 tablet 0    ezetimibe (Zetia) 10 MG tablet Take 1 tablet by mouth Daily. 90 tablet 1    Ferrous Sulfate (IRON PO) Take 65 mg by mouth Daily. PT HOLDING FOR SURGERY      glimepiride (Amaryl) 2 MG tablet Take 1 tablet by mouth 2 (Two) Times a Day. 60 tablet 1    glucose blood (Accu-Chek Jordyn Plus) test strip 1 each by Other route Daily. use to test daily as directed 100 each 3    lisinopril (PRINIVIL,ZESTRIL) 5 MG tablet Take 1 tablet by mouth Daily. 90 tablet 1    multivitamin with minerals (MULTIVITAMIN ADULTS 50+ PO) Take 1 tablet by mouth Daily. PT HOLDING FOR SURGERY      Semaglutide (Rybelsus) 14 MG tablet Take 1 tablet by mouth Daily. PT HOLDING FOR SURGERY      traMADol (Ultram) 50 MG tablet Take 1 tablet by mouth Every 6 (Six) Hours As Needed for Severe Pain or Moderate Pain for up to 12 doses. 12 tablet 0     No current facility-administered medications on file prior to visit.        ALLERGIES:   No Known Allergies     Body mass index is 34.58 kg/m².  176.5 cm (69.5\")  108 kg (237 lb 9.6 oz)    PHYSICAL EXAM:   Constitutional: Well-developed well-nourished, no acute distress  Gastrointestinal: Soft, nontender, incisions in good order         Mckinley Gonzalez M.D.  General and Endoscopic Surgery  [facility]    [address], " [address]  [place], [place], [address]  P: [phone]  F: [phone]

## 2024-07-24 NOTE — PROGRESS NOTES
Subjective   The ABCs of the Annual Wellness Visit  Medicare Wellness Visit      Junior Pollard Jr. is a 68 y.o. patient who presents for a Medicare Wellness Visit.    The following portions of the patient's history were reviewed and   updated as appropriate: allergies, current medications, past family history, past medical history, past social history, past surgical history, and problem list.    Compared to one year ago, the patient's physical   health is the same.  Compared to one year ago, the patient's mental   health is the same.    Recent Hospitalizations:  This patient has had a [facility] admission record on file within the last 365 days.  Current Medical Providers:  Patient Care Team:  Boogie Interiano MD as PCP - General (Family Medicine)  Gerald Bravo MD as Consulting Physician (Nephrology)  Magdi Ovalles MD as Consulting Physician (Ophthalmology)  Gee De Guzman MD as Consulting Physician (Nephrology)  Velma Pathak APRN as Nurse Practitioner (Nurse Practitioner)    Outpatient Medications Prior to Visit   Medication Sig Dispense Refill    Accu-Chek Softclix Lancets lancets USE AS DIRECTED TO TEST DAILY 100 each 3    aspirin (aspirin) 81 MG EC tablet Take 1 tablet by mouth Daily. PT HOLDING FOR SURGERY      Blood Glucose Monitoring Suppl (Accu-Chek Jordyn Plus) w/Device kit Use as directed to check BS daily E11.9 1 kit 0    Cholecalciferol 25 MCG (1000 UT) tablet Take 2 tablets by mouth Daily. PT HOLDING FOR SURGERY      Ferrous Sulfate (IRON PO) Take 65 mg by mouth Daily. PT HOLDING FOR SURGERY      glucose blood (Accu-Chek Jordyn Plus) test strip 1 each by Other route Daily. use to test daily as directed 100 each 3    multivitamin with minerals (MULTIVITAMIN ADULTS 50+ PO) Take 1 tablet by mouth Daily. PT HOLDING FOR SURGERY      traMADol (Ultram) 50 MG tablet Take 1 tablet by mouth Every 6 (Six) Hours As Needed for Severe Pain or Moderate Pain for up to 12 doses. 12  tablet 0    atorvastatin (Lipitor) 40 MG tablet Take 1 tablet by mouth Daily. 90 tablet 1    dapagliflozin Propanediol (Farxiga) 10 MG tablet Take 10 mg by mouth Daily. 30 tablet 0    ezetimibe (Zetia) 10 MG tablet Take 1 tablet by mouth Daily. 90 tablet 1    glimepiride (Amaryl) 2 MG tablet Take 1 tablet by mouth 2 (Two) Times a Day. 60 tablet 1    lisinopril (PRINIVIL,ZESTRIL) 5 MG tablet Take 1 tablet by mouth Daily. 90 tablet 1    Semaglutide (Rybelsus) 14 MG tablet Take 1 tablet by mouth Daily. PT HOLDING FOR SURGERY       No facility-administered medications prior to visit.     Opioid medication/s are on active medication list.  and I have evaluated his active treatment plan and pain score trends (see table).  Vitals:    07/25/24 0737   PainSc:   4     I have reviewed the chart for potential of high risk medication and harmful drug interactions in the elderly.        Aspirin is on active medication list. Aspirin use is indicated based on review of current medical condition/s. Pros and cons of this therapy have been discussed today. Benefits of this medication outweigh potential harm.  Patient has been encouraged to continue taking this medication.  .      Patient Active Problem List   Diagnosis    Abnormal cardiovascular stress test    Multiple actinic keratoses    Coronary atherosclerosis    Benign essential hypertension    Stage 3b chronic kidney disease    Diabetes mellitus with renal complications    Hypercholesterolemia    Elevated creatine kinase level    Onychomycosis of toenail    Osteoarthritis    Proteinuria    Type 2 diabetes mellitus    Diverticulitis of intestine, part unspecified, without perforation or abscess without bleeding    Diarrhea    Fever    Uncontrolled type 2 diabetes mellitus with hyperglycemia    Acute nontraumatic kidney injury    Clostridium difficile colitis    History of diverticulitis    Sepsis without acute organ dysfunction (present on admit)    Primary osteoarthritis of both  "knees     Advance Care Planning (Click this link to access ACP Navigator)  Advance Directive is not on file.  ACP discussion was held with the patient during this visit. Patient does not have an advance directive, information provided.        Objective   Vitals:    24 0737   BP: 134/60   Pulse: 70   Resp: 16   Temp: 97.8 °F (36.6 °C)   TempSrc: Oral   SpO2: 99%   Weight: 108 kg (237 lb)   Height: 176.5 cm (69.5\")   PainSc:   4       Estimated body mass index is 34.5 kg/m² as calculated from the following:    Height as of this encounter: 176.5 cm (69.5\").    Weight as of this encounter: 108 kg (237 lb).             Does the patient have evidence of cognitive impairment? No  Lab Results   Component Value Date    HGBA1C 6.80 (H) 2024                                                                                                Health  Risk Assessment    Smoking Status:  Social History     Tobacco Use   Smoking Status Never   Smokeless Tobacco Never     Alcohol Consumption:  Social History     Substance and Sexual Activity   Alcohol Use No     Fall Risk Screen:  ERIN Fall Risk Assessment has not been completed.    Depression Screenin/25/2024     7:34 AM   PHQ-2/PHQ-9 Depression Screening   Little Interest or Pleasure in Doing Things 0-->not at all   Feeling Down, Depressed or Hopeless 0-->not at all   PHQ-9: Brief Depression Severity Measure Score 0     Health Habits and Functional and Cognitive Screenin/25/2024     7:00 AM   Functional & Cognitive Status   Do you have difficulty preparing food and eating? No   Do you have difficulty bathing yourself, getting dressed or grooming yourself? No   Do you have difficulty using the toilet? No   Do you have difficulty moving around from place to place? No   Do you have trouble with steps or getting out of a bed or a chair? No   Current Diet Well Balanced Diet   Dental Exam Not up to date   Eye Exam Not up to date   Exercise (times per week) 5 " times per week   Current Exercises Include Walking   Do you need help using the phone?  No   Are you deaf or do you have serious difficulty hearing?  No   Do you need help to go to places out of walking distance? No   Do you need help shopping? No   Do you need help preparing meals?  No   Do you need help with housework?  No   Do you need help with laundry? No   Do you need help taking your medications? No   Do you need help managing money? No   Do you ever drive or ride in a car without wearing a seat belt? No   Have you felt unusual stress, anger or loneliness in the last month? No   Who do you live with? Spouse   If you need help, do you have trouble finding someone available to you? No   Have you been bothered in the last four weeks by sexual problems? No   Do you have difficulty concentrating, remembering or making decisions? No             Age-appropriate Screening Schedule:  Refer to the list below for future screening recommendations based on patient's age, sex and/or medical conditions. Orders for these recommended tests are listed in the plan section. The patient has been provided with a written plan.    Health Maintenance List  Health Maintenance   Topic Date Due    Pneumococcal Vaccine 65+ (1 of 2 - PCV) Never done    DIABETIC FOOT EXAM  01/15/2021    DIABETIC EYE EXAM  08/08/2024    COVID-19 Vaccine (8 - 2023-24 season) 07/27/2024 (Originally 3/7/2024)    ZOSTER VACCINE (2 of 2) 09/25/2024 (Originally 2/26/2014)    INFLUENZA VACCINE  08/01/2024    PROSTATE CANCER SCREENING  01/15/2025    LIPID PANEL  01/15/2025    URINE MICROALBUMIN  01/15/2025    HEMOGLOBIN A1C  01/18/2025    BMI FOLLOWUP  06/24/2025    ANNUAL WELLNESS VISIT  07/25/2025    TDAP/TD VACCINES (4 - Td or Tdap) 07/30/2028    HEPATITIS C SCREENING  Discontinued    COLORECTAL CANCER SCREENING  Discontinued                                                                                                                                             "    CMS Preventative Services Quick Reference  Risk Factors Identified During Encounter  None Identified    The above risks/problems have been discussed with the patient.  Pertinent information has been shared with the patient in the After Visit Summary.  An After Visit Summary and PPPS were made available to the patient.    Follow Up:   Next Medicare Wellness visit to be scheduled in 1 year.         Additional E&M Note during same encounter follows:  Patient has additional, significant, and separately identifiable condition(s)/problem(s) that require work above and beyond the Medicare Wellness Visit     Chief Complaint  Diabetes (MED REFILL/LAB RESULTS/[facility]), Hypertension, Hyperlipidemia, and medicare wellness (Due )    Subjective   HPI  Gumaro is also being seen today for additional medical problem/s.    Review of Systems   Constitutional:  Negative for chills and fever.   HENT:  Negative for congestion, ear pain and sinus pressure.    Eyes:  Negative for pain and visual disturbance.   Respiratory:  Negative for cough and shortness of breath.    Cardiovascular:  Negative for chest pain.   Gastrointestinal:  Negative for abdominal pain.   Endocrine: Negative for polydipsia and polyuria.   Genitourinary:  Negative for difficulty urinating and dysuria.   Skin: Negative.    Neurological: Negative.  Negative for tremors, speech difficulty and confusion.   Psychiatric/Behavioral:  Negative for dysphoric mood.               Objective   Vital Signs:  /60   Pulse 70   Temp 97.8 °F (36.6 °C) (Oral)   Resp 16   Ht 176.5 cm (69.5\")   Wt 108 kg (237 lb)   SpO2 99%   BMI 34.50 kg/m²   Physical Exam  Vitals and nursing note reviewed.   Constitutional:       General: He is not in acute distress.     Appearance: He is well-developed.   Cardiovascular:      Rate and Rhythm: Normal rate and regular rhythm.   Pulmonary:      Effort: Pulmonary effort is normal.      Breath sounds: Normal breath sounds.   Neurological:      " Mental Status: He is alert and oriented to person, place, and time.   Psychiatric:         Behavior: Behavior normal.         Thought Content: Thought content normal.     Labs reviewed with pt today during visit. All questions answered.      The following data was reviewed by: Boogie Interiano MD on 07/25/2024:        Assessment and Plan Additional age appropriate preventative wellness advice topics were discussed during today's preventative wellness exam(some topics already addressed during AWV portion of the note above):    Physical Activity: Advised cardiovascular activity 150 minutes per week as tolerated. (example brisk walk for 30 minutes, 5 days a week).     Nutrition: Discussed nutrition plan with patient. Information shared in after visit summary. Goal is for a well balanced diet to enhance overall health.              Encounter for subsequent annual wellness visit (AWV) in Medicare patient    Type 2 diabetes with nephropathy  Diabetes is stable.   Continue current treatment regimen.  Diabetes will be reassessed in 6 months  Hypercholesterolemia   Lipid abnormalities are stable    Plan:  Continue same medication/s without change.      Counseled patient on lifestyle modifications to help control hyperlipidemia.     Patient Treatment Goals:   LDL goal is less than 70    Followup in 6 months.  Benign essential hypertension  Hypertension is stable and controlled  Continue current treatment regimen.  Blood pressure will be reassessed in 6 months.  Special screening for malignant neoplasm of prostate      Orders Placed This Encounter   Procedures    Comprehensive metabolic panel     Standing Status:   Future     Standing Expiration Date:   7/24/2025     Order Specific Question:   Release to patient     Answer:   Routine Release [[id number]]    Lipid panel     Standing Status:   Future     Standing Expiration Date:   7/24/2025     Order Specific Question:   Release to patient     Answer:   Routine Release  [[id number]]    TSH     Standing Status:   Future     Standing Expiration Date:   7/24/2025     Order Specific Question:   Release to patient     Answer:   Routine Release [[id number]]    PSA     Standing Status:   Future     Standing Expiration Date:   7/24/2025     Order Specific Question:   Release to patient     Answer:   Routine Release [[id number]]    Hemoglobin A1c     Standing Status:   Future     Standing Expiration Date:   7/24/2025     Order Specific Question:   Release to patient     Answer:   Routine Release [[id number]]    MicroAlbumin, Urine, Random - Urine, Clean Catch     Standing Status:   Future     Standing Expiration Date:   7/24/2025     Order Specific Question:   Release to patient     Answer:   Routine Release [[id number]]    CBC and Differential     Standing Status:   Future     Standing Expiration Date:   7/24/2025     Order Specific Question:   Manual Differential     Answer:   Yes     Order Specific Question:   Release to patient     Answer:   Routine Release [[id number]]     New Medications Ordered This Visit   Medications    glimepiride (Amaryl) 2 MG tablet     Sig: Take 1 tablet by mouth 2 (Two) Times a Day.     Dispense:  180 tablet     Refill:  1     Don't fill until pt calls.    Semaglutide (Rybelsus) 14 MG tablet     Sig: Take 1 tablet by mouth Daily.     Dispense:  90 tablet     Refill:  1     Don't fill until pt calls.    atorvastatin (Lipitor) 40 MG tablet     Sig: Take 1 tablet by mouth Daily.     Dispense:  90 tablet     Refill:  1     Don't fill until pt calls.    dapagliflozin Propanediol (Farxiga) 10 MG tablet     Sig: Take 10 mg by mouth Daily.     Dispense:  90 tablet     Refill:  1     Don't fill until pt calls.    ezetimibe (Zetia) 10 MG tablet     Sig: Take 1 tablet by mouth Daily.     Dispense:  90 tablet     Refill:  1     Don't fill until pt calls.    lisinopril (PRINIVIL,ZESTRIL) 5 MG tablet     Sig: Take 1 tablet by mouth Daily.     Dispense:  90 tablet      Refill:  1     Don't fill until pt calls.        I spent 15 minutes caring for Junior on this date of service. This time includes time spent by me in the following activities:preparing for the visit, reviewing tests, performing a medically appropriate examination and/or evaluation , ordering medications, tests, or procedures, documenting information in the medical record, and independently interpreting results and communicating that information with the patient/family/caregiver  Follow Up   Return in about 6 months (around 1/25/2025) for Recheck.  Patient was given instructions and counseling regarding his condition or for health maintenance advice. Please see specific information pulled into the AVS if appropriate.

## 2024-07-25 ENCOUNTER — OFFICE VISIT (OUTPATIENT)
Dept: FAMILY MEDICINE CLINIC | Facility: CLINIC | Age: 69
End: 2024-07-25
Payer: MEDICARE

## 2024-07-25 VITALS
TEMPERATURE: 97.8 F | OXYGEN SATURATION: 99 % | SYSTOLIC BLOOD PRESSURE: 134 MMHG | HEIGHT: 70 IN | RESPIRATION RATE: 16 BRPM | HEART RATE: 70 BPM | BODY MASS INDEX: 33.93 KG/M2 | WEIGHT: 237 LBS | DIASTOLIC BLOOD PRESSURE: 60 MMHG

## 2024-07-25 DIAGNOSIS — E11.21 TYPE 2 DIABETES WITH NEPHROPATHY: Chronic | ICD-10-CM

## 2024-07-25 DIAGNOSIS — Z12.5 SPECIAL SCREENING FOR MALIGNANT NEOPLASM OF PROSTATE: ICD-10-CM

## 2024-07-25 DIAGNOSIS — E78.00 HYPERCHOLESTEROLEMIA: Chronic | ICD-10-CM

## 2024-07-25 DIAGNOSIS — I10 BENIGN ESSENTIAL HYPERTENSION: Chronic | ICD-10-CM

## 2024-07-25 DIAGNOSIS — Z00.00 ENCOUNTER FOR SUBSEQUENT ANNUAL WELLNESS VISIT (AWV) IN MEDICARE PATIENT: Primary | ICD-10-CM

## 2024-07-25 PROCEDURE — 1159F MED LIST DOCD IN RCRD: CPT | Performed by: FAMILY MEDICINE

## 2024-07-25 PROCEDURE — 3075F SYST BP GE 130 - 139MM HG: CPT | Performed by: FAMILY MEDICINE

## 2024-07-25 PROCEDURE — 1170F FXNL STATUS ASSESSED: CPT | Performed by: FAMILY MEDICINE

## 2024-07-25 PROCEDURE — 3044F HG A1C LEVEL LT 7.0%: CPT | Performed by: FAMILY MEDICINE

## 2024-07-25 PROCEDURE — 1160F RVW MEDS BY RX/DR IN RCRD: CPT | Performed by: FAMILY MEDICINE

## 2024-07-25 PROCEDURE — G0439 PPPS, SUBSEQ VISIT: HCPCS | Performed by: FAMILY MEDICINE

## 2024-07-25 PROCEDURE — 1125F AMNT PAIN NOTED PAIN PRSNT: CPT | Performed by: FAMILY MEDICINE

## 2024-07-25 PROCEDURE — 99214 OFFICE O/P EST MOD 30 MIN: CPT | Performed by: FAMILY MEDICINE

## 2024-07-25 PROCEDURE — 96160 PT-FOCUSED HLTH RISK ASSMT: CPT | Performed by: FAMILY MEDICINE

## 2024-07-25 PROCEDURE — 3078F DIAST BP <80 MM HG: CPT | Performed by: FAMILY MEDICINE

## 2024-07-25 RX ORDER — EZETIMIBE 10 MG/1
10 TABLET ORAL DAILY
Qty: 90 TABLET | Refills: 1 | Status: SHIPPED | OUTPATIENT
Start: 2024-07-25

## 2024-07-25 RX ORDER — LISINOPRIL 5 MG/1
5 TABLET ORAL DAILY
Qty: 90 TABLET | Refills: 1 | Status: SHIPPED | OUTPATIENT
Start: 2024-07-25

## 2024-07-25 RX ORDER — ATORVASTATIN CALCIUM 40 MG/1
40 TABLET, FILM COATED ORAL DAILY
Qty: 90 TABLET | Refills: 1 | Status: SHIPPED | OUTPATIENT
Start: 2024-07-25

## 2024-07-25 RX ORDER — GLIMEPIRIDE 2 MG/1
2 TABLET ORAL
Qty: 180 TABLET | Refills: 1 | Status: SHIPPED | OUTPATIENT
Start: 2024-07-25

## 2024-07-25 RX ORDER — ORAL SEMAGLUTIDE 14 MG/1
1 TABLET ORAL DAILY
Qty: 90 TABLET | Refills: 1 | Status: SHIPPED | OUTPATIENT
Start: 2024-07-25

## 2024-07-25 RX ORDER — DAPAGLIFLOZIN 10 MG/1
10 TABLET, FILM COATED ORAL DAILY
Qty: 90 TABLET | Refills: 1 | Status: SHIPPED | OUTPATIENT
Start: 2024-07-25

## 2024-07-25 NOTE — PATIENT INSTRUCTIONS
Medicare Wellness  Personal Prevention Plan of Service     Date of Office Visit:    Encounter Provider:  Boogie Interiano MD  Place of Service:  [facility]  Patient Name: Junior Pollard Jr.  :  1955    As part of the Medicare Wellness portion of your visit today, we are providing you with this personalized preventive plan of services (PPPS). This plan is based upon recommendations of the [place] Preventive Services Task Force (USPSTF) and the Advisory Committee on Immunization Practices (ACIP).    This lists the preventive care services that should be considered, and provides dates of when you are due. Items listed as completed are up-to-date and do not require any further intervention.    Health Maintenance   Topic Date Due    Pneumococcal Vaccine 65+ (1 of 2 - PCV) Never done    DIABETIC FOOT EXAM  01/15/2021    DIABETIC EYE EXAM  2024    COVID-19 Vaccine ( - - season) 2024 (Originally 3/7/2024)    ZOSTER VACCINE (2 of 2) 2024 (Originally 2014)    INFLUENZA VACCINE  2024    PROSTATE CANCER SCREENING  01/15/2025    LIPID PANEL  01/15/2025    URINE MICROALBUMIN  01/15/2025    HEMOGLOBIN A1C  2025    BMI FOLLOWUP  2025    ANNUAL WELLNESS VISIT  2025    TDAP/TD VACCINES (4 - Td or Tdap) 2028    HEPATITIS C SCREENING  Discontinued    COLORECTAL CANCER SCREENING  Discontinued       No orders of the defined types were placed in this encounter.      Return in about 6 months (around 2025) for Recheck.

## 2024-08-06 ENCOUNTER — TELEPHONE (OUTPATIENT)
Dept: FAMILY MEDICINE CLINIC | Facility: CLINIC | Age: 69
End: 2024-08-06

## 2024-08-06 ENCOUNTER — TELEPHONE (OUTPATIENT)
Dept: FAMILY MEDICINE CLINIC | Facility: CLINIC | Age: 69
End: 2024-08-06
Payer: MEDICARE

## 2024-08-06 ENCOUNTER — PRIOR AUTHORIZATION (OUTPATIENT)
Dept: FAMILY MEDICINE CLINIC | Facility: CLINIC | Age: 69
End: 2024-08-06
Payer: MEDICARE

## 2024-08-06 DIAGNOSIS — E11.21 TYPE 2 DIABETES WITH NEPHROPATHY: Chronic | ICD-10-CM

## 2024-08-06 DIAGNOSIS — E11.21 TYPE 2 DIABETES WITH NEPHROPATHY: Primary | ICD-10-CM

## 2024-08-06 RX ORDER — DAPAGLIFLOZIN 10 MG/1
10 TABLET, FILM COATED ORAL DAILY
Qty: 90 TABLET | Refills: 1 | Status: CANCELLED | OUTPATIENT
Start: 2024-08-06

## 2024-08-06 NOTE — TELEPHONE ENCOUNTER
Caller: LatriceLynn    Relationship: Emergency Contact    Best call back number: [phone]*    What is the best time to reach you: ANYTIME    Who are you requesting to speak with (clinical staff, provider,  specific staff member): CLINICAL    What was the call regarding: REQUEST A CALL BACK TO ADVISE WHY empagliflozin (Jardiance) 25 MG tablet PRESCRIPTION HAS BEEN SENT TO THE PATIENT'S PHARMACY. LYNN STATES THAT SHE DOES NOT WANT TO  THIS MEDICATION UNTIL SHE SPEAKS TO THE PRACTICE.    Is it okay if the provider responds through MyChart: NO

## 2024-08-06 NOTE — TELEPHONE ENCOUNTER
Caller: Annabelle Pollard    Relationship: Emergency Contact    Best call back number: [phone]     Requested Prescriptions:   Requested Prescriptions     Pending Prescriptions Disp Refills    dapagliflozin Propanediol (Farxiga) 10 MG tablet 90 tablet 1     Sig: Take 10 mg by mouth Daily.        Pharmacy where request should be sent: [facility] [id number] [address] AT [facility] [phone] [facility] [phone] FX     Last office visit with prescribing clinician: 7/25/2024   Last telemedicine visit with prescribing clinician: Visit date not found   Next office visit with prescribing clinician: 1/28/2025     Additional details provided by patient: PATIENT'S WIFE STATED THAT THE INSURANCE AND PHARMACY IS NEEDING A PRIOR AUTHORIZATION.    Does the patient have less than a 3 day supply:  [x] Yes  [] No    Would you like a call back once the refill request has been completed: [] Yes [] No    If the office needs to give you a call back, can they leave a voicemail: [] Yes [] No    Dilcia Vázquez Rep   08/06/24 10:09 EDT

## 2024-08-06 NOTE — TELEPHONE ENCOUNTER
I talked to pt. Pt was told prior auth for farxiga was denied and dr paulino changed medication . Pt is going to contact insurance company again because she was told ins would cover name brand faxiga and not generic  Pt was told jardiance was sent in replacement of medication.

## 2024-08-06 NOTE — TELEPHONE ENCOUNTER
toño wayne (Key: [id number])  PA Case ID #: [id number]  Need Help? Call us at [phone]  Status  sent iconSent to Plan today  Drug  Farxiga 10MG tablets  PRIOR AUTH PENDING SENT TO [facility]

## 2024-08-06 NOTE — TELEPHONE ENCOUNTER
abdi wayne (Key: [id number])  PA Case ID #: [id number]  Need Help? Call us at [phone]  Status  sent iconSent to Plan today  Drug  Farxiga 10MG tablets  PRIOR AUTH PENDING SENT TO [facility]

## 2024-09-24 ENCOUNTER — APPOINTMENT (OUTPATIENT)
Dept: CT IMAGING | Facility: HOSPITAL | Age: 69
End: 2024-09-24
Payer: MEDICARE

## 2024-09-24 ENCOUNTER — HOSPITAL ENCOUNTER (EMERGENCY)
Facility: HOSPITAL | Age: 69
Discharge: HOME | End: 2024-09-24
Attending: EMERGENCY MEDICINE | Admitting: EMERGENCY MEDICINE
Payer: MEDICARE

## 2024-09-24 VITALS
OXYGEN SATURATION: 98 % | WEIGHT: 243 LBS | RESPIRATION RATE: 18 BRPM | BODY MASS INDEX: 32.91 KG/M2 | DIASTOLIC BLOOD PRESSURE: 67 MMHG | TEMPERATURE: 98.2 F | HEART RATE: 75 BPM | SYSTOLIC BLOOD PRESSURE: 136 MMHG | HEIGHT: 72 IN

## 2024-09-24 DIAGNOSIS — R10.30 LOWER ABDOMINAL PAIN: Primary | ICD-10-CM

## 2024-09-24 LAB
ALBUMIN SERPL-MCNC: 4.2 G/DL (ref 3.5–5.2)
ALBUMIN/GLOB SERPL: 1.5 G/DL
ALP SERPL-CCNC: 81 U/L (ref 39–117)
ALT SERPL W P-5'-P-CCNC: 25 U/L (ref 1–41)
ANION GAP SERPL CALCULATED.3IONS-SCNC: 8.4 MMOL/L (ref 5–15)
AST SERPL-CCNC: 18 U/L (ref 1–40)
BASOPHILS # BLD AUTO: 0.03 10*3/MM3 (ref 0–0.2)
BASOPHILS NFR BLD AUTO: 0.4 % (ref 0–1.5)
BILIRUB SERPL-MCNC: 0.6 MG/DL (ref 0–1.2)
BILIRUB UR QL STRIP: NEGATIVE
BUN SERPL-MCNC: 29 MG/DL (ref 8–23)
BUN/CREAT SERPL: 16.4 (ref 7–25)
CALCIUM SPEC-SCNC: 9.8 MG/DL (ref 8.6–10.5)
CHLORIDE SERPL-SCNC: 106 MMOL/L (ref 98–107)
CLARITY UR: CLEAR
CO2 SERPL-SCNC: 24.6 MMOL/L (ref 22–29)
COLOR UR: YELLOW
CREAT SERPL-MCNC: 1.77 MG/DL (ref 0.76–1.27)
DEPRECATED RDW RBC AUTO: 43.7 FL (ref 37–54)
EGFRCR SERPLBLD CKD-EPI 2021: 40.8 ML/MIN/1.73
EOSINOPHIL # BLD AUTO: 0.13 10*3/MM3 (ref 0–0.4)
EOSINOPHIL NFR BLD AUTO: 1.7 % (ref 0.3–6.2)
ERYTHROCYTE [DISTWIDTH] IN BLOOD BY AUTOMATED COUNT: 12.5 % (ref 12.3–15.4)
GLOBULIN UR ELPH-MCNC: 2.8 GM/DL
GLUCOSE SERPL-MCNC: 202 MG/DL (ref 65–99)
GLUCOSE UR STRIP-MCNC: ABNORMAL MG/DL
HCT VFR BLD AUTO: 39.4 % (ref 37.5–51)
HGB BLD-MCNC: 12.9 G/DL (ref 13–17.7)
HGB UR QL STRIP.AUTO: NEGATIVE
IMM GRANULOCYTES # BLD AUTO: 0 10*3/MM3 (ref 0–0.05)
IMM GRANULOCYTES NFR BLD AUTO: 0 % (ref 0–0.5)
KETONES UR QL STRIP: NEGATIVE
LEUKOCYTE ESTERASE UR QL STRIP.AUTO: NEGATIVE
LIPASE SERPL-CCNC: 53 U/L (ref 13–60)
LYMPHOCYTES # BLD AUTO: 1.68 10*3/MM3 (ref 0.7–3.1)
LYMPHOCYTES NFR BLD AUTO: 21.6 % (ref 19.6–45.3)
MCH RBC QN AUTO: 30.6 PG (ref 26.6–33)
MCHC RBC AUTO-ENTMCNC: 32.7 G/DL (ref 31.5–35.7)
MCV RBC AUTO: 93.6 FL (ref 79–97)
MONOCYTES # BLD AUTO: 0.59 10*3/MM3 (ref 0.1–0.9)
MONOCYTES NFR BLD AUTO: 7.6 % (ref 5–12)
NEUTROPHILS NFR BLD AUTO: 5.33 10*3/MM3 (ref 1.7–7)
NEUTROPHILS NFR BLD AUTO: 68.7 % (ref 42.7–76)
NITRITE UR QL STRIP: NEGATIVE
PH UR STRIP.AUTO: <=5 [PH] (ref 5–8)
PLATELET # BLD AUTO: 211 10*3/MM3 (ref 140–450)
PMV BLD AUTO: 10.2 FL (ref 6–12)
POTASSIUM SERPL-SCNC: 3.9 MMOL/L (ref 3.5–5.2)
PROT SERPL-MCNC: 7 G/DL (ref 6–8.5)
PROT UR QL STRIP: NEGATIVE
RBC # BLD AUTO: 4.21 10*6/MM3 (ref 4.14–5.8)
SODIUM SERPL-SCNC: 139 MMOL/L (ref 136–145)
SP GR UR STRIP: 1.01 (ref 1–1.03)
UROBILINOGEN UR QL STRIP: ABNORMAL
WBC NRBC COR # BLD AUTO: 7.76 10*3/MM3 (ref 3.4–10.8)

## 2024-09-24 PROCEDURE — 99284 EMERGENCY DEPT VISIT MOD MDM: CPT

## 2024-09-24 PROCEDURE — 83690 ASSAY OF LIPASE: CPT | Performed by: PHYSICIAN ASSISTANT

## 2024-09-24 PROCEDURE — 81003 URINALYSIS AUTO W/O SCOPE: CPT | Performed by: PHYSICIAN ASSISTANT

## 2024-09-24 PROCEDURE — 80053 COMPREHEN METABOLIC PANEL: CPT | Performed by: PHYSICIAN ASSISTANT

## 2024-09-24 PROCEDURE — 74176 CT ABD & PELVIS W/O CONTRAST: CPT

## 2024-09-24 PROCEDURE — 85025 COMPLETE CBC W/AUTO DIFF WBC: CPT | Performed by: PHYSICIAN ASSISTANT

## 2024-09-24 RX ORDER — DICYCLOMINE HYDROCHLORIDE 10 MG/1
10 CAPSULE ORAL
Qty: 40 CAPSULE | Refills: 1 | Status: SHIPPED | OUTPATIENT
Start: 2024-09-24

## 2024-12-11 ENCOUNTER — TELEPHONE (OUTPATIENT)
Dept: FAMILY MEDICINE CLINIC | Facility: CLINIC | Age: 69
End: 2024-12-11

## 2024-12-11 NOTE — TELEPHONE ENCOUNTER
Caller: [employer]    Relationship to patient: Other      Best call back number:     [phone]       Medication PA needed: Semaglutide (Rybelsus) 14 MG tablet     Reason for call/Prior Auth: PATIENT IS DUE FOR AN UPDATED PA FOR THE UPCOMING YEAR.

## 2024-12-18 ENCOUNTER — PRIOR AUTHORIZATION (OUTPATIENT)
Dept: FAMILY MEDICINE CLINIC | Facility: CLINIC | Age: 69
End: 2024-12-18
Payer: MEDICARE

## 2024-12-24 ENCOUNTER — E-VISIT (OUTPATIENT)
Dept: FAMILY MEDICINE CLINIC | Facility: TELEHEALTH | Age: 69
End: 2024-12-24
Payer: MEDICARE

## 2024-12-24 PROCEDURE — FABRICHEALTHVISIT: Performed by: NURSE PRACTITIONER

## 2024-12-24 NOTE — E-VISIT TREATED
Date: 2024 18:55:10  Clinician: Verena Peters  Clinician NPI: [id number]  Patient: Junior Pollard  Patient : 1955  Patient Address: [address] [place], [place] [id number]  Patient Phone: [phone]  Visit Protocol: URI  Patient Summary:  Junior is a 69 year old ( : 1955 ) male who initiated a visit for cold, sinus infection, or influenza.     Junior states the symptoms started gradually 2-3 weeks ago.   Symptom start date: 24   The symptoms consist of   facial pain or pressure, nasal congestion, malaise, rhinitis, and a headache. Junior is experiencing difficulty breathing due to nasal congestion but is not short of breath.   Symptom details     Nasal secretions: The color of the mucus is green.    Facial pain or pressure: The facial pain or pressure feels worse when bending over or leaning forward.     Headache: The headache is moderate (4-6 on a 10 point pain scale).      Junior denies having anosmia and ageusia, diarrhea, teeth pain, wheezing, chills, sore throat, myalgias, nausea, ear pain, vomiting, cough, and fever. Junior also denies taking antibiotic medication in the past month, having recent facial or sinus   surgery in the past 60 days, and double sickening (worsening symptoms after initial improvement).    Junior has not received the RSV vaccine.   Pertinent COVID-19 (Coronavirus) information  Since the symptoms started, Junior has tested for COVID-19. The   result of the test was negative.   Junior has not had COVID-19 in the last 3 months.   Junior has received a COVID-19 vaccine in the past year.     Pertinent medical history   Junior has diabetes. A provider has told Junior that their diabetes is in   control.   Junior reports having the following conditions:   Hypertension    Junior had 1 sinus infection within the past year.   A provider has told Junior to avoid NSAIDs.   Junior denies having immunosuppressive conditions (e.g., chemotherapy, HIV,   organ  transplant, long-term use of steroids or other immunosuppressive medications, splenectomy). Junior denies having severe COPD, heart disease, and congestive heart failure. Junior does not have asthma.   Junior does not smoke or use smokeless   tobacco. Junior does not vape or use other e-cigarette products.   Weight: 242 lbs (109.77 kg)    MEDICATIONS: hydromorphone injection, famotidine intravenous, metronidazole oral, Rybelsus oral, hydromorphone injection, piperacillin-tazobactam intravenous, baclofen oral, vancomycin oral, lisinopril oral, hydromorphone injection, empagliflozin oral,   tramadol oral, ezetimibe oral, pantoprazole intravenous, acetaminophen oral, ketorolac injection, ferrous sulfate oral, ciprofloxacin HCl oral, hydrocodone-acetaminophen oral, glimepiride oral, dicyclomine oral, aspirin oral, ALLERGIES: NKDA  Clinician Response:  Dear Junior,  Based on the information provided, you have acute bacterial sinusitis, also known as a sinus infection. Sinus infections are caused by bacteria or a virus and symptoms are almost always identical. The difference between   the 2 types of infections is timing.  Sinus infections start as viral infections and symptoms improve on their own in about 7 days. A bacterial infection may have developed if any of the following apply to you:     You have had 7 days of symptoms and are experiencing at least 2 of the following:       Fever    Facial pressure or headache    Green or yellow nasal mucus    Symptoms that improved and then got worse again       You have had symptoms for 10 or more days     Medication information  I am prescribing:     Amoxicillin-pot clavulanate 875-125 mg oral tablet. Take 1 tablet by mouth every 12 hours for 7 days. There are no refills with this prescription.   Unless you are allergic to the over-the-counter medication(s) below, I recommend using:       Saline nasal spray or drops(Ocean or store brand). Use 1-2 drops or sprays in each nostril  as needed for congestion.      Fluticasone 50 mcg/actuation nasal spray. Inhale 2 sprays in each nostril 1 time per day; after 1 week, may adjust to 1 - 2 sprays in each nostril 1 time per day. This medication takes several days to start working, so keep taking it even if it doesn't   help right away.     Over-the-counter medications do not require a prescription. Ask the pharmacist if you have any questions.  Tips for using a nasal spray:     When the medication is being sprayed in your nose, point the tip of the nasal spray towards your ear.    Do not blow your nose after using the spray.    Do not lean your head back after using the spray as it will go down your throat.    Wipe the tip of the nose piece after use with a dry, clean tissue.     Self care  Steps you can take to be as comfortable as possible:     Rest.    Drink plenty of fluids.    Take a warm shower to loosen congestion.    Use a cool-mist humidifier.     When to seek care  Please be seen in a clinic or urgent care if any of the following occur:     New symptoms develop, or symptoms become worse    Symptoms do not start to improve after 3 days of treatment     Call 911 or go to the emergency room if any of the following occur:     If you feel that your throat is closing off    Suddenly develop a rash    Unable to swallow fluids or are drooling     It is possible to have an allergic reaction to an antibiotic even if you have not had one in the past. If you notice a new rash, significant swelling, or difficulty breathing, stop taking this medication immediately and go to a clinic or urgent care.    For the latest updates on COVID-19 (Coronavirus), please visit the Centers for Disease Control and Prevention (CDC). Also, your state and local health department websites may provide additional guidance regarding testing and isolation recommendations for   your location.   Diagnosis: Acute bacterial sinusitis  Diagnosis ICD: J01.90    Follow up  instructions: ATTENTION: If you have been prescribed medications, your prescriptions will not be sent until you choose your pharmacy.  To do so open the link within your notification, or go to [employer] and click eVisit in the menu to open your   treatment plan. From there, you can select your pharmacy at the bottom of your after visit summary. You can also go to [email or web address]  Prescriptions  Prescription: amoxicillin-pot clavulanate 875-125 mg oral tablet, take 1 tablet by mouth every 12 hours for 7 days  Sent To: [facility] [id number] - [id number] - [address] [place] [address]

## 2025-01-26 NOTE — PROGRESS NOTES
Chief Complaint:   Chief Complaint   Patient presents with    Diabetes     Med refill   Lab results  [facility]    Hypertension    Hyperlipidemia       Junior Pollard Jr. 69 y.o. male who presents today for Medical Management of the below listed issues. He  has a problem list of   Patient Active Problem List   Diagnosis    Abnormal cardiovascular stress test    Multiple actinic keratoses    Coronary atherosclerosis    Benign essential hypertension    Stage 3b chronic kidney disease    Diabetes mellitus with renal complications    Hypercholesterolemia    Elevated creatine kinase level    Onychomycosis of toenail    Osteoarthritis    Proteinuria    Type 2 diabetes mellitus    Diverticulitis of intestine, part unspecified, without perforation or abscess without bleeding    Diarrhea    Fever    Uncontrolled type 2 diabetes mellitus with hyperglycemia    Acute nontraumatic kidney injury    Clostridium difficile colitis    History of diverticulitis    Sepsis without acute organ dysfunction (present on admit)    Primary osteoarthritis of both knees   .  Since the last visit, He has overall felt well.  he has been compliant with   Current Outpatient Medications:     Accu-Chek Softclix Lancets lancets, USE AS DIRECTED TO TEST DAILY, Disp: 100 each, Rfl: 3    atorvastatin (Lipitor) 40 MG tablet, Take 1 tablet by mouth Daily., Disp: 90 tablet, Rfl: 1    dapagliflozin Propanediol (Farxiga) 10 MG tablet, Take 10 mg by mouth Daily., Disp: 90 tablet, Rfl: 1    ezetimibe (Zetia) 10 MG tablet, Take 1 tablet by mouth Daily., Disp: 90 tablet, Rfl: 1    glimepiride (Amaryl) 2 MG tablet, Take 1 tablet by mouth 2 (Two) Times a Day., Disp: 180 tablet, Rfl: 1    glucose blood (Accu-Chek Jordyn Plus) test strip, 1 each by Other route Daily. use to test daily as directed, Disp: 100 each, Rfl: 3    lisinopril (PRINIVIL,ZESTRIL) 5 MG tablet, Take 1 tablet by mouth Daily., Disp: 90 tablet, Rfl: 1    Semaglutide (Rybelsus) 14 MG tablet, Take 1  "tablet by mouth Daily., Disp: 90 tablet, Rfl: 1    aspirin (aspirin) 81 MG EC tablet, Take 1 tablet by mouth Daily. PT HOLDING FOR SURGERY, Disp: , Rfl:     Blood Glucose Monitoring Suppl (Accu-Chek Jordyn Plus) w/Device kit, Use as directed to check BS daily E11.9, Disp: 1 kit, Rfl: 0    Cholecalciferol 25 MCG (1000 UT) tablet, Take 2 tablets by mouth Daily. PT HOLDING FOR SURGERY, Disp: , Rfl:     Ferrous Sulfate (IRON PO), Take 65 mg by mouth Daily. PT HOLDING FOR SURGERY, Disp: , Rfl:     multivitamin with minerals (MULTIVITAMIN ADULTS 50+ PO), Take 1 tablet by mouth Daily. PT HOLDING FOR SURGERY, Disp: , Rfl: .  He denies medication side effects.    All of the other chronic condition(s) listed above are stable w/o issues.    /66   Pulse 82   Temp 97.9 °F (36.6 °C) (Oral)   Resp 16   Ht 177.8 cm (70\")   Wt 116 kg (256 lb)   SpO2 99%   BMI 36.73 kg/m²     Results for orders placed or performed in visit on 12/24/24   Comprehensive metabolic panel    Collection Time: 01/20/25  8:05 AM    Specimen: Blood   Result Value Ref Range    Glucose 103 (H) 65 - 99 mg/dL    BUN 30 (H) 8 - 23 mg/dL    Creatinine 1.71 (H) 0.76 - 1.27 mg/dL    EGFR Result 42.3 (L) >60.0 mL/min/1.73    BUN/Creatinine Ratio 17.5 7.0 - 25.0    Sodium 144 136 - 145 mmol/L    Potassium 4.6 3.5 - 5.2 mmol/L    Chloride 107 98 - 107 mmol/L    Total CO2 23.6 22.0 - 29.0 mmol/L    Calcium 9.8 8.6 - 10.5 mg/dL    Total Protein 7.1 6.0 - 8.5 g/dL    Albumin 4.5 3.5 - 5.2 g/dL    Globulin 2.6 gm/dL    A/G Ratio 1.7 g/dL    Total Bilirubin 0.8 0.0 - 1.2 mg/dL    Alkaline Phosphatase 80 39 - 117 U/L    AST (SGOT) 22 1 - 40 U/L    ALT (SGPT) 25 1 - 41 U/L   Lipid panel    Collection Time: 01/20/25  8:05 AM    Specimen: Blood   Result Value Ref Range    Total Cholesterol 125 0 - 200 mg/dL    Triglycerides 74 0 - 150 mg/dL    HDL Cholesterol 41 40 - 60 mg/dL    VLDL Cholesterol Segundo 15 5 - 40 mg/dL    LDL Chol Calc ([facility]) 69 0 - 100 mg/dL   TSH    " Collection Time: 01/20/25  8:05 AM    Specimen: Blood   Result Value Ref Range    TSH 1.890 0.270 - 4.200 uIU/mL   PSA    Collection Time: 01/20/25  8:05 AM    Specimen: Blood   Result Value Ref Range    PSA 0.630 0.000 - 4.000 ng/mL   Hemoglobin A1c    Collection Time: 01/20/25  8:05 AM    Specimen: Blood   Result Value Ref Range    Hemoglobin A1C 7.80 (H) 4.80 - 5.60 %   MicroAlbumin, Urine, Random - Urine, Clean Catch    Collection Time: 01/20/25  8:05 AM    Specimen: Urine, Clean Catch   Result Value Ref Range    Microalbumin, Urine 80.8 Not Estab. ug/mL   CBC and Differential    Collection Time: 01/20/25  8:05 AM    Specimen: Blood   Result Value Ref Range    WBC 8.00 3.40 - 10.80 10*3/mm3    RBC 4.59 4.14 - 5.80 10*6/mm3    Hemoglobin 14.5 13.0 - 17.7 g/dL    Hematocrit 42.4 37.5 - 51.0 %    MCV 92.4 79.0 - 97.0 fL    MCH 31.6 26.6 - 33.0 pg    MCHC 34.2 31.5 - 35.7 g/dL    RDW 12.5 12.3 - 15.4 %    Platelets 243 140 - 450 10*3/mm3    Neutrophil Rel % 64.3 42.7 - 76.0 %    Lymphocyte Rel % 27.3 19.6 - 45.3 %    Monocyte Rel % 6.0 5.0 - 12.0 %    Eosinophil Rel % 1.1 0.3 - 6.2 %    Basophil Rel % 1.0 0.0 - 1.5 %    Neutrophils Absolute 5.15 1.70 - 7.00 10*3/mm3    Lymphocytes Absolute 2.18 0.70 - 3.10 10*3/mm3    Monocytes Absolute 0.48 0.10 - 0.90 10*3/mm3    Eosinophils Absolute 0.09 0.00 - 0.40 10*3/mm3    Basophils Absolute 0.08 0.00 - 0.20 10*3/mm3    Immature Granulocyte Rel % 0.3 0.0 - 0.5 %    Immature Grans Absolute 0.02 0.00 - 0.05 10*3/mm3    nRBC 0.0 0.0 - 0.2 /100 WBC             The following portions of the patient's history were reviewed and updated as appropriate: allergies, current medications, past family history, past medical history, past social history, past surgical history, and problem list.    Review of Systems   Constitutional:  Negative for activity change, chills and fever.   Respiratory:  Negative for cough.    Cardiovascular:  Negative for chest pain.   Psychiatric/Behavioral:   Negative for dysphoric mood.        Objective             Physical Exam  Vitals and nursing note reviewed.   Constitutional:       General: He is not in acute distress.     Appearance: He is well-developed.   Cardiovascular:      Rate and Rhythm: Normal rate and regular rhythm.   Pulmonary:      Effort: Pulmonary effort is normal.      Breath sounds: Normal breath sounds.   Neurological:      Mental Status: He is alert and oriented to person, place, and time.   Psychiatric:         Behavior: Behavior normal.         Thought Content: Thought content normal.     Labs reviewed with pt today during visit. All questions answered.          Diagnoses and all orders for this visit:    1. Type 2 diabetes with nephropathy (Primary)  Comments:  worsening; low-carb diet and daily exercise stressed  Orders:  -     Basic Metabolic Panel; Future  -     Hemoglobin A1c; Future  -     glimepiride (Amaryl) 2 MG tablet; Take 1 tablet by mouth 2 (Two) Times a Day.  Dispense: 180 tablet; Refill: 1  -     Semaglutide (Rybelsus) 14 MG tablet; Take 1 tablet by mouth Daily.  Dispense: 90 tablet; Refill: 1  -     dapagliflozin Propanediol (Farxiga) 10 MG tablet; Take 10 mg by mouth Daily.  Dispense: 90 tablet; Refill: 1  -     Accu-Chek Softclix Lancets lancets; USE AS DIRECTED TO TEST DAILY  Dispense: 100 each; Refill: 3  -     glucose blood (Accu-Chek Jordyn Plus) test strip; 1 each by Other route Daily. use to test daily as directed  Dispense: 100 each; Refill: 3    2. Hypercholesterolemia  -     atorvastatin (Lipitor) 40 MG tablet; Take 1 tablet by mouth Daily.  Dispense: 90 tablet; Refill: 1  -     ezetimibe (Zetia) 10 MG tablet; Take 1 tablet by mouth Daily.  Dispense: 90 tablet; Refill: 1    3. Benign essential hypertension  -     lisinopril (PRINIVIL,ZESTRIL) 5 MG tablet; Take 1 tablet by mouth Daily.  Dispense: 90 tablet; Refill: 1    4. Type 2 diabetes with nephropathy  -     Basic Metabolic Panel; Future  -     Hemoglobin A1c;  Future  -     glimepiride (Amaryl) 2 MG tablet; Take 1 tablet by mouth 2 (Two) Times a Day.  Dispense: 180 tablet; Refill: 1  -     Semaglutide (Rybelsus) 14 MG tablet; Take 1 tablet by mouth Daily.  Dispense: 90 tablet; Refill: 1  -     dapagliflozin Propanediol (Farxiga) 10 MG tablet; Take 10 mg by mouth Daily.  Dispense: 90 tablet; Refill: 1  -     Accu-Chek Softclix Lancets lancets; USE AS DIRECTED TO TEST DAILY  Dispense: 100 each; Refill: 3  -     glucose blood (Accu-Chek Jordyn Plus) test strip; 1 each by Other route Daily. use to test daily as directed  Dispense: 100 each; Refill: 3

## 2025-01-28 ENCOUNTER — OFFICE VISIT (OUTPATIENT)
Dept: FAMILY MEDICINE CLINIC | Facility: CLINIC | Age: 70
End: 2025-01-28
Payer: MEDICARE

## 2025-01-28 VITALS
OXYGEN SATURATION: 99 % | WEIGHT: 256 LBS | DIASTOLIC BLOOD PRESSURE: 66 MMHG | RESPIRATION RATE: 16 BRPM | SYSTOLIC BLOOD PRESSURE: 146 MMHG | BODY MASS INDEX: 36.65 KG/M2 | HEIGHT: 70 IN | HEART RATE: 82 BPM | TEMPERATURE: 97.9 F

## 2025-01-28 DIAGNOSIS — E11.21 TYPE 2 DIABETES WITH NEPHROPATHY: Chronic | ICD-10-CM

## 2025-01-28 DIAGNOSIS — E11.21 TYPE 2 DIABETES WITH NEPHROPATHY: Primary | Chronic | ICD-10-CM

## 2025-01-28 DIAGNOSIS — I10 BENIGN ESSENTIAL HYPERTENSION: Chronic | ICD-10-CM

## 2025-01-28 DIAGNOSIS — E78.00 HYPERCHOLESTEROLEMIA: Chronic | ICD-10-CM

## 2025-01-28 PROCEDURE — 3051F HG A1C>EQUAL 7.0%<8.0%: CPT | Performed by: FAMILY MEDICINE

## 2025-01-28 PROCEDURE — 3077F SYST BP >= 140 MM HG: CPT | Performed by: FAMILY MEDICINE

## 2025-01-28 PROCEDURE — G2211 COMPLEX E/M VISIT ADD ON: HCPCS | Performed by: FAMILY MEDICINE

## 2025-01-28 PROCEDURE — 3078F DIAST BP <80 MM HG: CPT | Performed by: FAMILY MEDICINE

## 2025-01-28 PROCEDURE — 1125F AMNT PAIN NOTED PAIN PRSNT: CPT | Performed by: FAMILY MEDICINE

## 2025-01-28 PROCEDURE — 1159F MED LIST DOCD IN RCRD: CPT | Performed by: FAMILY MEDICINE

## 2025-01-28 PROCEDURE — 1160F RVW MEDS BY RX/DR IN RCRD: CPT | Performed by: FAMILY MEDICINE

## 2025-01-28 PROCEDURE — 99214 OFFICE O/P EST MOD 30 MIN: CPT | Performed by: FAMILY MEDICINE

## 2025-01-28 RX ORDER — BLOOD SUGAR DIAGNOSTIC
1 STRIP MISCELLANEOUS DAILY
Qty: 100 EACH | Refills: 3 | Status: SHIPPED | OUTPATIENT
Start: 2025-01-28

## 2025-01-28 RX ORDER — EZETIMIBE 10 MG/1
10 TABLET ORAL DAILY
Qty: 90 TABLET | Refills: 1 | Status: SHIPPED | OUTPATIENT
Start: 2025-01-28

## 2025-01-28 RX ORDER — ORAL SEMAGLUTIDE 14 MG/1
1 TABLET ORAL DAILY
Qty: 90 TABLET | Refills: 1 | Status: SHIPPED | OUTPATIENT
Start: 2025-01-28

## 2025-01-28 RX ORDER — ATORVASTATIN CALCIUM 40 MG/1
40 TABLET, FILM COATED ORAL DAILY
Qty: 90 TABLET | Refills: 1 | Status: SHIPPED | OUTPATIENT
Start: 2025-01-28

## 2025-01-28 RX ORDER — LISINOPRIL 5 MG/1
5 TABLET ORAL DAILY
Qty: 90 TABLET | Refills: 1 | Status: SHIPPED | OUTPATIENT
Start: 2025-01-28

## 2025-01-28 RX ORDER — GLIMEPIRIDE 2 MG/1
2 TABLET ORAL
Qty: 180 TABLET | Refills: 1 | Status: SHIPPED | OUTPATIENT
Start: 2025-01-28

## 2025-01-28 RX ORDER — DAPAGLIFLOZIN 10 MG/1
10 TABLET, FILM COATED ORAL DAILY
COMMUNITY
End: 2025-01-28 | Stop reason: SDUPTHER

## 2025-01-28 RX ORDER — DAPAGLIFLOZIN 10 MG/1
10 TABLET, FILM COATED ORAL DAILY
Qty: 90 TABLET | Refills: 1 | Status: SHIPPED | OUTPATIENT
Start: 2025-01-28

## 2025-01-28 RX ORDER — LANCETS
EACH MISCELLANEOUS
Qty: 100 EACH | Refills: 3 | Status: SHIPPED | OUTPATIENT
Start: 2025-01-28

## 2025-06-09 NOTE — PROGRESS NOTES
Chief Complaint:   Chief Complaint   Patient presents with    Hypertension    Diabetes    Hyperlipidemia       Junior Pollard Jr. 69 y.o. male who presents today for Medical Management of the below listed issues. He  has a problem list of   Patient Active Problem List   Diagnosis    Abnormal cardiovascular stress test    Multiple actinic keratoses    Coronary atherosclerosis    Benign essential hypertension    Stage 3b chronic kidney disease    Diabetes mellitus with renal complications    Hypercholesterolemia    Elevated creatine kinase level    Onychomycosis of toenail    Osteoarthritis    Proteinuria    Type 2 diabetes mellitus    Diverticulitis of intestine, part unspecified, without perforation or abscess without bleeding    Diarrhea    Fever    Uncontrolled type 2 diabetes mellitus with hyperglycemia    Acute nontraumatic kidney injury    Clostridium difficile colitis    History of diverticulitis    Sepsis without acute organ dysfunction (present on admit)    Primary osteoarthritis of both knees   .  Since the last visit, He has had symptoms previsit note:    I was talking with my daughter Miesha (patient there as well).   And with the recent death and stress and poor sleep habits with the sudden lose of   my wife. My sugar has been jumping around a lot.     Would it be possible to get you to prescribe me mounjaro.  Miesha has been doing a lot of research and found where it could help slow down the progression of CKD.  As well as help me maintain my A1C and possibly lose weight.            he has been compliant with   Current Outpatient Medications:     Accu-Chek Softclix Lancets lancets, USE AS DIRECTED TO TEST DAILY, Disp: 100 each, Rfl: 3    aspirin (aspirin) 81 MG EC tablet, Take 1 tablet by mouth Daily. PT HOLDING FOR SURGERY, Disp: , Rfl:     atorvastatin (Lipitor) 40 MG tablet, Take 1 tablet by mouth Daily., Disp: 90 tablet, Rfl: 1    Blood Glucose Monitoring Suppl (Accu-Chek Jordyn Plus) w/Device  "kit, Use as directed to check BS daily E11.9, Disp: 1 kit, Rfl: 0    Cholecalciferol 25 MCG (1000 UT) tablet, Take 2 tablets by mouth Daily. PT HOLDING FOR SURGERY, Disp: , Rfl:     dapagliflozin Propanediol (Farxiga) 10 MG tablet, Take 10 mg by mouth Daily., Disp: 90 tablet, Rfl: 1    ezetimibe (Zetia) 10 MG tablet, Take 1 tablet by mouth Daily., Disp: 90 tablet, Rfl: 1    Ferrous Sulfate (IRON PO), Take 65 mg by mouth Daily. PT HOLDING FOR SURGERY, Disp: , Rfl:     glimepiride (Amaryl) 2 MG tablet, Take 1 tablet by mouth 2 (Two) Times a Day., Disp: 180 tablet, Rfl: 1    glucose blood (Accu-Chek Jordyn Plus) test strip, 1 each by Other route Daily. use to test daily as directed, Disp: 100 each, Rfl: 3    lisinopril (PRINIVIL,ZESTRIL) 5 MG tablet, Take 1 tablet by mouth Daily., Disp: 90 tablet, Rfl: 1    multivitamin with minerals (MULTIVITAMIN ADULTS 50+ PO), Take 1 tablet by mouth Daily. PT HOLDING FOR SURGERY, Disp: , Rfl:     Tirzepatide 10 MG/0.5ML solution auto-injector, Inject 10 mg under the skin into the appropriate area as directed 1 (One) Time Per Week., Disp: 6 mL, Rfl: 0.  He denies medication side effects.    All of the other chronic condition(s) listed above are stable w/o issues.    /62   Pulse 79   Temp 98 °F (36.7 °C) (Temporal)   Ht 177.8 cm (70\")   Wt 121 kg (267 lb)   SpO2 99%   BMI 38.31 kg/m²     Results for orders placed or performed in visit on 12/24/24   Comprehensive metabolic panel    Collection Time: 01/20/25  8:05 AM    Specimen: Blood   Result Value Ref Range    Glucose 103 (H) 65 - 99 mg/dL    BUN 30 (H) 8 - 23 mg/dL    Creatinine 1.71 (H) 0.76 - 1.27 mg/dL    EGFR Result 42.3 (L) >60.0 mL/min/1.73    BUN/Creatinine Ratio 17.5 7.0 - 25.0    Sodium 144 136 - 145 mmol/L    Potassium 4.6 3.5 - 5.2 mmol/L    Chloride 107 98 - 107 mmol/L    Total CO2 23.6 22.0 - 29.0 mmol/L    Calcium 9.8 8.6 - 10.5 mg/dL    Total Protein 7.1 6.0 - 8.5 g/dL    Albumin 4.5 3.5 - 5.2 g/dL    " Globulin 2.6 gm/dL    A/G Ratio 1.7 g/dL    Total Bilirubin 0.8 0.0 - 1.2 mg/dL    Alkaline Phosphatase 80 39 - 117 U/L    AST (SGOT) 22 1 - 40 U/L    ALT (SGPT) 25 1 - 41 U/L   Lipid panel    Collection Time: 01/20/25  8:05 AM    Specimen: Blood   Result Value Ref Range    Total Cholesterol 125 0 - 200 mg/dL    Triglycerides 74 0 - 150 mg/dL    HDL Cholesterol 41 40 - 60 mg/dL    VLDL Cholesterol Segundo 15 5 - 40 mg/dL    LDL Chol Calc ([facility]) 69 0 - 100 mg/dL   TSH    Collection Time: 01/20/25  8:05 AM    Specimen: Blood   Result Value Ref Range    TSH 1.890 0.270 - 4.200 uIU/mL   PSA    Collection Time: 01/20/25  8:05 AM    Specimen: Blood   Result Value Ref Range    PSA 0.630 0.000 - 4.000 ng/mL   Hemoglobin A1c    Collection Time: 01/20/25  8:05 AM    Specimen: Blood   Result Value Ref Range    Hemoglobin A1C 7.80 (H) 4.80 - 5.60 %   MicroAlbumin, Urine, Random - Urine, Clean Catch    Collection Time: 01/20/25  8:05 AM    Specimen: Urine, Clean Catch   Result Value Ref Range    Microalbumin, Urine 80.8 Not Estab. ug/mL   CBC and Differential    Collection Time: 01/20/25  8:05 AM    Specimen: Blood   Result Value Ref Range    WBC 8.00 3.40 - 10.80 10*3/mm3    RBC 4.59 4.14 - 5.80 10*6/mm3    Hemoglobin 14.5 13.0 - 17.7 g/dL    Hematocrit 42.4 37.5 - 51.0 %    MCV 92.4 79.0 - 97.0 fL    MCH 31.6 26.6 - 33.0 pg    MCHC 34.2 31.5 - 35.7 g/dL    RDW 12.5 12.3 - 15.4 %    Platelets 243 140 - 450 10*3/mm3    Neutrophil Rel % 64.3 42.7 - 76.0 %    Lymphocyte Rel % 27.3 19.6 - 45.3 %    Monocyte Rel % 6.0 5.0 - 12.0 %    Eosinophil Rel % 1.1 0.3 - 6.2 %    Basophil Rel % 1.0 0.0 - 1.5 %    Neutrophils Absolute 5.15 1.70 - 7.00 10*3/mm3    Lymphocytes Absolute 2.18 0.70 - 3.10 10*3/mm3    Monocytes Absolute 0.48 0.10 - 0.90 10*3/mm3    Eosinophils Absolute 0.09 0.00 - 0.40 10*3/mm3    Basophils Absolute 0.08 0.00 - 0.20 10*3/mm3    Immature Granulocyte Rel % 0.3 0.0 - 0.5 %    Immature Grans Absolute 0.02 0.00 - 0.05 10*3/mm3     nRBC 0.0 0.0 - 0.2 /100 WBC             The following portions of the patient's history were reviewed and updated as appropriate: allergies, current medications, past family history, past medical history, past social history, past surgical history, and problem list.    Review of Systems   Constitutional:  Negative for activity change, chills and fever.   Respiratory:  Negative for cough.    Cardiovascular:  Negative for chest pain.   Psychiatric/Behavioral:  Negative for dysphoric mood.        Objective             Physical Exam  Vitals and nursing note reviewed.   Constitutional:       General: He is not in acute distress.     Appearance: He is well-developed.   Cardiovascular:      Rate and Rhythm: Normal rate and regular rhythm.   Pulmonary:      Effort: Pulmonary effort is normal.      Breath sounds: Normal breath sounds.   Neurological:      Mental Status: He is alert and oriented to person, place, and time.   Psychiatric:         Behavior: Behavior normal.         Thought Content: Thought content normal.             Diagnoses and all orders for this visit:    1. Uncontrolled type 2 diabetes mellitus with hyperglycemia (Primary)  -     Tirzepatide 10 MG/0.5ML solution auto-injector; Inject 10 mg under the skin into the appropriate area as directed 1 (One) Time Per Week.  Dispense: 6 mL; Refill: 0

## 2025-06-11 ENCOUNTER — OFFICE VISIT (OUTPATIENT)
Dept: FAMILY MEDICINE CLINIC | Facility: CLINIC | Age: 70
End: 2025-06-11
Payer: MEDICARE

## 2025-06-11 VITALS
DIASTOLIC BLOOD PRESSURE: 62 MMHG | TEMPERATURE: 98 F | HEIGHT: 70 IN | WEIGHT: 267 LBS | OXYGEN SATURATION: 99 % | HEART RATE: 79 BPM | SYSTOLIC BLOOD PRESSURE: 130 MMHG | BODY MASS INDEX: 38.22 KG/M2

## 2025-06-11 DIAGNOSIS — E11.65 UNCONTROLLED TYPE 2 DIABETES MELLITUS WITH HYPERGLYCEMIA: Primary | ICD-10-CM

## 2025-06-11 PROCEDURE — 1125F AMNT PAIN NOTED PAIN PRSNT: CPT | Performed by: FAMILY MEDICINE

## 2025-06-11 PROCEDURE — 1160F RVW MEDS BY RX/DR IN RCRD: CPT | Performed by: FAMILY MEDICINE

## 2025-06-11 PROCEDURE — 3075F SYST BP GE 130 - 139MM HG: CPT | Performed by: FAMILY MEDICINE

## 2025-06-11 PROCEDURE — 1159F MED LIST DOCD IN RCRD: CPT | Performed by: FAMILY MEDICINE

## 2025-06-11 PROCEDURE — 3051F HG A1C>EQUAL 7.0%<8.0%: CPT | Performed by: FAMILY MEDICINE

## 2025-06-11 PROCEDURE — 99213 OFFICE O/P EST LOW 20 MIN: CPT | Performed by: FAMILY MEDICINE

## 2025-06-11 PROCEDURE — 3078F DIAST BP <80 MM HG: CPT | Performed by: FAMILY MEDICINE

## 2025-06-12 ENCOUNTER — TELEPHONE (OUTPATIENT)
Dept: FAMILY MEDICINE CLINIC | Facility: CLINIC | Age: 70
End: 2025-06-12
Payer: MEDICARE

## 2025-06-12 ENCOUNTER — PRIOR AUTHORIZATION (OUTPATIENT)
Dept: FAMILY MEDICINE CLINIC | Facility: CLINIC | Age: 70
End: 2025-06-12
Payer: MEDICARE

## 2025-06-12 NOTE — TELEPHONE ENCOUNTER
Patient called asking for the prior authorization for Tirzepatide 10 MG/0.5ML solution auto-injector    I informed the patient that Reyna King clinical coordinator, was working on the prior authorization.

## 2025-06-12 NOTE — TELEPHONE ENCOUNTER
ISIS LEZAMA (Torres: [id number])  Mounjaro 2.5MG/0.5ML auto-injectors  Form  [employer] Electronic [employer] Form  Created  2 minutes ago  Sent to Plan  less than a minute ago  Determination  Message from Plan  Eligibility could not be verified for this patient - patient not found. Please review patient information and re-submit.

## 2025-07-22 DIAGNOSIS — E11.21 TYPE 2 DIABETES WITH NEPHROPATHY: Chronic | ICD-10-CM

## 2025-07-22 DIAGNOSIS — E78.00 HYPERCHOLESTEROLEMIA: Chronic | ICD-10-CM

## 2025-07-23 RX ORDER — GLIMEPIRIDE 2 MG/1
2 TABLET ORAL EVERY 12 HOURS SCHEDULED
Qty: 180 TABLET | Refills: 1 | OUTPATIENT
Start: 2025-07-23

## 2025-07-23 RX ORDER — ATORVASTATIN CALCIUM 40 MG/1
40 TABLET, FILM COATED ORAL DAILY
Qty: 90 TABLET | Refills: 1 | OUTPATIENT
Start: 2025-07-23

## 2025-07-23 RX ORDER — EZETIMIBE 10 MG/1
10 TABLET ORAL DAILY
Qty: 90 TABLET | Refills: 1 | OUTPATIENT
Start: 2025-07-23

## 2025-07-23 RX ORDER — DAPAGLIFLOZIN 10 MG/1
1 TABLET, FILM COATED ORAL DAILY
Qty: 90 TABLET | Refills: 1 | OUTPATIENT
Start: 2025-07-23

## 2025-07-24 DIAGNOSIS — E11.21 TYPE 2 DIABETES WITH NEPHROPATHY: Chronic | ICD-10-CM

## 2025-07-24 DIAGNOSIS — E78.00 HYPERCHOLESTEROLEMIA: Chronic | ICD-10-CM

## 2025-07-24 RX ORDER — EZETIMIBE 10 MG/1
10 TABLET ORAL DAILY
Qty: 90 TABLET | Refills: 1 | OUTPATIENT
Start: 2025-07-24

## 2025-07-24 RX ORDER — DAPAGLIFLOZIN 10 MG/1
1 TABLET, FILM COATED ORAL DAILY
Qty: 90 TABLET | Refills: 1 | OUTPATIENT
Start: 2025-07-24

## 2025-07-24 RX ORDER — BLOOD SUGAR DIAGNOSTIC
1 STRIP MISCELLANEOUS DAILY
Qty: 100 EACH | Refills: 3 | OUTPATIENT
Start: 2025-07-24

## 2025-07-24 RX ORDER — DAPAGLIFLOZIN 10 MG/1
10 TABLET, FILM COATED ORAL DAILY
Qty: 90 TABLET | Refills: 1 | OUTPATIENT
Start: 2025-07-24

## 2025-07-24 RX ORDER — GLIMEPIRIDE 2 MG/1
2 TABLET ORAL EVERY 12 HOURS SCHEDULED
Qty: 180 TABLET | Refills: 1 | OUTPATIENT
Start: 2025-07-24

## 2025-07-24 RX ORDER — ATORVASTATIN CALCIUM 40 MG/1
40 TABLET, FILM COATED ORAL DAILY
Qty: 90 TABLET | Refills: 1 | OUTPATIENT
Start: 2025-07-24

## 2025-07-24 RX ORDER — GLIMEPIRIDE 2 MG/1
2 TABLET ORAL
Qty: 180 TABLET | Refills: 1 | OUTPATIENT
Start: 2025-07-24

## 2025-07-25 DIAGNOSIS — E78.00 HYPERCHOLESTEROLEMIA: Chronic | ICD-10-CM

## 2025-07-25 DIAGNOSIS — E11.21 TYPE 2 DIABETES WITH NEPHROPATHY: Chronic | ICD-10-CM

## 2025-07-25 RX ORDER — DAPAGLIFLOZIN 10 MG/1
1 TABLET, FILM COATED ORAL DAILY
Qty: 90 TABLET | Refills: 1 | OUTPATIENT
Start: 2025-07-25

## 2025-07-25 RX ORDER — EZETIMIBE 10 MG/1
10 TABLET ORAL DAILY
Qty: 90 TABLET | Refills: 1 | OUTPATIENT
Start: 2025-07-25

## 2025-07-25 RX ORDER — GLIMEPIRIDE 2 MG/1
2 TABLET ORAL EVERY 12 HOURS SCHEDULED
Qty: 180 TABLET | Refills: 1 | OUTPATIENT
Start: 2025-07-25

## 2025-07-25 RX ORDER — ATORVASTATIN CALCIUM 40 MG/1
40 TABLET, FILM COATED ORAL DAILY
Qty: 90 TABLET | Refills: 1 | OUTPATIENT
Start: 2025-07-25

## 2025-07-28 RX ORDER — PIOGLITAZONE 30 MG/1
30 TABLET ORAL DAILY
Qty: 90 TABLET | Refills: 1 | Status: CANCELLED | OUTPATIENT
Start: 2025-07-28

## 2025-07-28 RX ORDER — LISINOPRIL 5 MG/1
5 TABLET ORAL DAILY
Qty: 90 TABLET | Refills: 1 | Status: CANCELLED | OUTPATIENT
Start: 2025-07-28

## 2025-07-28 NOTE — PROGRESS NOTES
Subjective   The ABCs of the Annual Wellness Visit  Medicare Wellness Visit      Junior Pollard Jr. is a 69 y.o. patient who presents for a Medicare Wellness Visit.    The following portions of the patient's history were reviewed and   updated as appropriate: allergies, current medications, past family history, past medical history, past social history, past surgical history, and problem list.    Compared to one year ago, the patient's physical   health is the same.  Compared to one year ago, the patient's mental   health is the same.    Recent Hospitalizations:  He was not admitted to the hospital during the last year.     Current Medical Providers:  Patient Care Team:  Boogie Interiano MD as PCP - General (Family Medicine)  Gerald Bravo MD as Consulting Physician (Nephrology)  Magdi Ovalles MD as Consulting Physician (Ophthalmology)  Gee De Guzman MD as Consulting Physician (Nephrology)  Velma Pathak APRN as Nurse Practitioner (Nurse Practitioner)    Outpatient Medications Prior to Visit   Medication Sig Dispense Refill    lisinopril (PRINIVIL,ZESTRIL) 5 MG tablet Take 1 tablet by mouth Daily. 90 tablet 1    Tirzepatide 10 MG/0.5ML solution auto-injector Inject 10 mg under the skin into the appropriate area as directed 1 (One) Time Per Week. 6 mL 0    Accu-Chek Softclix Lancets lancets USE AS DIRECTED TO TEST DAILY 100 each 3    aspirin (aspirin) 81 MG EC tablet Take 1 tablet by mouth Daily. PT HOLDING FOR SURGERY      Blood Glucose Monitoring Suppl (Accu-Chek Jordyn Plus) w/Device kit Use as directed to check BS daily E11.9 1 kit 0    Cholecalciferol 25 MCG (1000 UT) tablet Take 2 tablets by mouth Daily. PT HOLDING FOR SURGERY      Ferrous Sulfate (IRON PO) Take 65 mg by mouth Daily. PT HOLDING FOR SURGERY      glucose blood (Accu-Chek Jordyn Plus) test strip 1 each by Other route Daily. use to test daily as directed 100 each 3    multivitamin with minerals (MULTIVITAMIN ADULTS 50+ PO)  Take 1 tablet by mouth Daily. PT HOLDING FOR SURGERY      atorvastatin (Lipitor) 40 MG tablet Take 1 tablet by mouth Daily. 90 tablet 1    dapagliflozin Propanediol (Farxiga) 10 MG tablet Take 10 mg by mouth Daily. 90 tablet 1    ezetimibe (Zetia) 10 MG tablet Take 1 tablet by mouth Daily. 90 tablet 1    glimepiride (Amaryl) 2 MG tablet Take 1 tablet by mouth 2 (Two) Times a Day. 180 tablet 1     No facility-administered medications prior to visit.     No opioid medication identified on active medication list. I have reviewed chart for other potential  high risk medication/s and harmful drug interactions in the elderly.      Aspirin is on active medication list. Aspirin use is indicated based on review of current medical condition/s. Pros and cons of this therapy have been discussed today. Benefits of this medication outweigh potential harm.  Patient has been encouraged to continue taking this medication.  .      Patient Active Problem List   Diagnosis    Abnormal cardiovascular stress test    Multiple actinic keratoses    Coronary atherosclerosis    Benign essential hypertension    Stage 3b chronic kidney disease    Diabetes mellitus with renal complications    Hypercholesterolemia    Elevated creatine kinase level    Onychomycosis of toenail    Osteoarthritis    Proteinuria    Type 2 diabetes mellitus    Diverticulitis of intestine, part unspecified, without perforation or abscess without bleeding    Diarrhea    Fever    Uncontrolled type 2 diabetes mellitus with hyperglycemia    Acute nontraumatic kidney injury    Clostridium difficile colitis    History of diverticulitis    Sepsis without acute organ dysfunction (present on admit)    Primary osteoarthritis of both knees     Advance Care Planning Advance Directive is not on file.  ACP discussion was held with the patient during this visit. Patient does not have an advance directive, declines further assistance.            Objective   Vitals:    07/29/25 0744  "  BP: 144/76   Pulse: 94   Resp: 18   Temp: 98.2 °F (36.8 °C)   TempSrc: Temporal   SpO2: 98%   Weight: 119 kg (262 lb 6.4 oz)   Height: 177.8 cm (70\")   PainSc: 6        Estimated body mass index is 37.65 kg/m² as calculated from the following:    Height as of this encounter: 177.8 cm (70\").    Weight as of this encounter: 119 kg (262 lb 6.4 oz).    Class 2 Severe Obesity (BMI >=35 and <=39.9). Obesity-related health conditions include the following: hypertension and diabetes mellitus. Obesity is unchanged. BMI is is above average; BMI management plan is completed. We discussed portion control and increasing exercise.           Does the patient have evidence of cognitive impairment? No  Lab Results   Component Value Date    HGBA1C 9.60 (H) 2025                                                                                                Health  Risk Assessment    Smoking Status:  Social History     Tobacco Use   Smoking Status Never   Smokeless Tobacco Never     Alcohol Consumption:  Social History     Substance and Sexual Activity   Alcohol Use No       Fall Risk Screen  STEADI Fall Risk Assessment has not been completed.    Depression Screening   Little interest or pleasure in doing things? Not at all   Feeling down, depressed, or hopeless? Not at all   PHQ-2 Total Score 0      Health Habits and Functional and Cognitive Screenin/29/2025     7:00 AM   Functional & Cognitive Status   Do you have difficulty preparing food and eating? No   Do you have difficulty bathing yourself, getting dressed or grooming yourself? No   Do you have difficulty using the toilet? No   Do you have difficulty moving around from place to place? No   Do you have trouble with steps or getting out of a bed or a chair? No   Current Diet Well Balanced Diet   Dental Exam Up to date   Eye Exam Up to date   Exercise (times per week) 4 times per week   Current Exercises Include Walking   Do you need help using the phone?  No   Are " you deaf or do you have serious difficulty hearing?  No   Do you need help to go to places out of walking distance? No   Do you need help shopping? No   Do you need help preparing meals?  No   Do you need help with housework?  No   Do you need help with laundry? No   Do you need help taking your medications? No   Do you need help managing money? No   Do you ever drive or ride in a car without wearing a seat belt? No   Have you felt unusual fatigue (could be tiredness), stress, anger or loneliness in the last month? No   Who do you live with? Alone   If you need help, do you have trouble finding someone available to you? No   Have you been bothered in the last four weeks by sexual problems? No   Do you have difficulty concentrating, remembering or making decisions? No           Age-appropriate Screening Schedule:  Refer to the list below for future screening recommendations based on patient's age, sex and/or medical conditions. Orders for these recommended tests are listed in the plan section. The patient has been provided with a written plan.    Health Maintenance List  Health Maintenance   Topic Date Due    URINE MICROALBUMIN-CREATININE RATIO (uACR)  07/30/2025    DIABETIC EYE EXAM  08/16/2025    DIABETIC FOOT EXAM  09/04/2025 (Originally 1/15/2021)    Pneumococcal Vaccine 50+ (1 of 2 - PCV) 12/08/2025 (Originally 11/12/1974)    ZOSTER VACCINE (2 of 2) 12/08/2025 (Originally 2/26/2014)    COVID-19 Vaccine (9 - 2024-25 season) 12/11/2025 (Originally 4/17/2025)    INFLUENZA VACCINE  10/01/2025    PROSTATE CANCER SCREENING  01/20/2026    LIPID PANEL  01/20/2026    HEMOGLOBIN A1C  01/21/2026    ANNUAL WELLNESS VISIT  07/29/2026    TDAP/TD VACCINES (4 - Td or Tdap) 07/30/2028    HEPATITIS C SCREENING  Discontinued    COLORECTAL CANCER SCREENING  Discontinued                                                                                                                                                CMS Preventative  "Services Quick Reference  Risk Factors Identified During Encounter  None Identified    The above risks/problems have been discussed with the patient.  Pertinent information has been shared with the patient in the After Visit Summary.  An After Visit Summary and PPPS were made available to the patient.    Follow Up:   Next Medicare Wellness visit to be scheduled in 1 year.         Additional E&M Note during same encounter follows:  Patient has additional, significant, and separately identifiable condition(s)/problem(s) that require work above and beyond the Medicare Wellness Visit     Chief Complaint  Diabetes (Med refill due /Lab results/[facility] ), Hypertension, Hyperlipidemia, Arthritis, and MEDICARE WELLNESS (DUE)    Subjective   HPI  Gumaro is also being seen today for additional medical problem/s.    Review of Systems   Constitutional:  Negative for chills and fever.   HENT:  Negative for congestion, ear pain and sinus pressure.    Eyes:  Negative for pain and visual disturbance.   Respiratory:  Negative for cough and shortness of breath.    Cardiovascular:  Negative for chest pain.   Gastrointestinal:  Negative for abdominal pain.   Genitourinary:  Negative for difficulty urinating and dysuria.   Skin: Negative.    Neurological: Negative.    Psychiatric/Behavioral:  Negative for dysphoric mood.               Objective   Vital Signs:  /76   Pulse 94   Temp 98.2 °F (36.8 °C) (Temporal)   Resp 18   Ht 177.8 cm (70\")   Wt 119 kg (262 lb 6.4 oz)   SpO2 98%   BMI 37.65 kg/m²   Physical Exam  Vitals and nursing note reviewed.   Constitutional:       General: He is not in acute distress.     Appearance: He is well-developed.   Cardiovascular:      Rate and Rhythm: Normal rate and regular rhythm.   Pulmonary:      Effort: Pulmonary effort is normal.      Breath sounds: Normal breath sounds.   Neurological:      Mental Status: He is alert and oriented to person, place, and time.   Psychiatric:         Behavior: " Behavior normal.         Thought Content: Thought content normal.     Labs reviewed with pt today during visit. All questions answered.                 Assessment and Plan Additional age appropriate preventative wellness advice topics were discussed during today's preventative wellness exam(some topics already addressed during AWV portion of the note above):    Physical Activity: Advised cardiovascular activity 150 minutes per week as tolerated. (example brisk walk for 30 minutes, 5 days a week).     Nutrition: Discussed nutrition plan with patient. Information shared in after visit summary. Goal is for a well balanced diet to enhance overall health.     Encounter for subsequent annual wellness visit (AWV) in Medicare patient         Type 2 diabetes with nephropathy  Diabetes is worsening.   Medication changes per orders.  Recommended a Mediterranean style of eating  Regular aerobic exercise.  Diabetes will be reassessed in 3 months    Orders:    glimepiride (Amaryl) 2 MG tablet; Take 1 tablet by mouth 2 (Two) Times a Day.    dapagliflozin Propanediol (Farxiga) 10 MG tablet; Take 10 mg by mouth Daily.    Comprehensive metabolic panel; Future    Lipid panel; Future    Hemoglobin A1c; Future    Microalbumin / Creatinine Urine Ratio - Urine, Clean Catch; Future    Basic Metabolic Panel; Future    Hemoglobin A1c; Future    Tirzepatide 15 MG/0.5ML solution auto-injector; Inject 15 mg under the skin into the appropriate area as directed 1 (One) Time Per Week.    Hypercholesterolemia   Lipid abnormalities are stable    Plan:  Continue same medication/s without change.      Counseled patient on lifestyle modifications to help control hyperlipidemia.     Patient Treatment Goals:   LDL goal is less than 70    Followup in 6 months.    Orders:    ezetimibe (Zetia) 10 MG tablet; Take 1 tablet by mouth Daily.    atorvastatin (Lipitor) 40 MG tablet; Take 1 tablet by mouth Daily.    Lipid panel; Future    Benign essential  hypertension  Hypertension is stable and controlled  Continue current treatment regimen.  Blood pressure will be reassessed in 6 months.    Orders:    Comprehensive metabolic panel; Future    Lipid panel; Future    CBC and Differential; Future    TSH; Future    lisinopril (PRINIVIL,ZESTRIL) 10 MG tablet; Take 1 tablet by mouth Daily.    Special screening for malignant neoplasm of prostate    Orders:    PSA; Future          I spent 15 minutes caring for Junior on this date of service. This time includes time spent by me in the following activities:preparing for the visit, reviewing tests, performing a medically appropriate examination and/or evaluation , ordering medications, tests, or procedures, documenting information in the medical record, and independently interpreting results and communicating that information with the patient/family/caregiver  Follow Up   Return in about 6 months (around 1/29/2026) for Recheck, Medicare Wellness Visit 1 year.  Patient was given instructions and counseling regarding his condition or for health maintenance advice. Please see specific information pulled into the AVS if appropriate.

## 2025-07-28 NOTE — ASSESSMENT & PLAN NOTE
Lipid abnormalities are stable    Plan:  Continue same medication/s without change.      Counseled patient on lifestyle modifications to help control hyperlipidemia.     Patient Treatment Goals:   LDL goal is less than 70    Followup in 6 months.    Orders:    ezetimibe (Zetia) 10 MG tablet; Take 1 tablet by mouth Daily.    atorvastatin (Lipitor) 40 MG tablet; Take 1 tablet by mouth Daily.    Lipid panel; Future

## 2025-07-28 NOTE — ASSESSMENT & PLAN NOTE
Hypertension is stable and controlled  Continue current treatment regimen.  Blood pressure will be reassessed in 6 months.    Orders:    Comprehensive metabolic panel; Future    Lipid panel; Future    CBC and Differential; Future    TSH; Future    lisinopril (PRINIVIL,ZESTRIL) 10 MG tablet; Take 1 tablet by mouth Daily.

## 2025-07-29 ENCOUNTER — OFFICE VISIT (OUTPATIENT)
Dept: FAMILY MEDICINE CLINIC | Facility: CLINIC | Age: 70
End: 2025-07-29
Payer: MEDICARE

## 2025-07-29 VITALS
OXYGEN SATURATION: 98 % | BODY MASS INDEX: 37.56 KG/M2 | HEART RATE: 94 BPM | TEMPERATURE: 98.2 F | SYSTOLIC BLOOD PRESSURE: 144 MMHG | DIASTOLIC BLOOD PRESSURE: 76 MMHG | WEIGHT: 262.4 LBS | RESPIRATION RATE: 18 BRPM | HEIGHT: 70 IN

## 2025-07-29 DIAGNOSIS — E78.00 HYPERCHOLESTEROLEMIA: Chronic | ICD-10-CM

## 2025-07-29 DIAGNOSIS — E11.21 TYPE 2 DIABETES WITH NEPHROPATHY: Chronic | ICD-10-CM

## 2025-07-29 DIAGNOSIS — I10 BENIGN ESSENTIAL HYPERTENSION: Chronic | ICD-10-CM

## 2025-07-29 DIAGNOSIS — Z00.00 ENCOUNTER FOR SUBSEQUENT ANNUAL WELLNESS VISIT (AWV) IN MEDICARE PATIENT: Primary | ICD-10-CM

## 2025-07-29 DIAGNOSIS — Z12.5 SPECIAL SCREENING FOR MALIGNANT NEOPLASM OF PROSTATE: ICD-10-CM

## 2025-07-29 PROCEDURE — 3077F SYST BP >= 140 MM HG: CPT | Performed by: FAMILY MEDICINE

## 2025-07-29 PROCEDURE — G0439 PPPS, SUBSEQ VISIT: HCPCS | Performed by: FAMILY MEDICINE

## 2025-07-29 PROCEDURE — 1125F AMNT PAIN NOTED PAIN PRSNT: CPT | Performed by: FAMILY MEDICINE

## 2025-07-29 PROCEDURE — 3046F HEMOGLOBIN A1C LEVEL >9.0%: CPT | Performed by: FAMILY MEDICINE

## 2025-07-29 PROCEDURE — 96160 PT-FOCUSED HLTH RISK ASSMT: CPT | Performed by: FAMILY MEDICINE

## 2025-07-29 PROCEDURE — 1160F RVW MEDS BY RX/DR IN RCRD: CPT | Performed by: FAMILY MEDICINE

## 2025-07-29 PROCEDURE — 3078F DIAST BP <80 MM HG: CPT | Performed by: FAMILY MEDICINE

## 2025-07-29 PROCEDURE — 1170F FXNL STATUS ASSESSED: CPT | Performed by: FAMILY MEDICINE

## 2025-07-29 PROCEDURE — 99214 OFFICE O/P EST MOD 30 MIN: CPT | Performed by: FAMILY MEDICINE

## 2025-07-29 PROCEDURE — 1159F MED LIST DOCD IN RCRD: CPT | Performed by: FAMILY MEDICINE

## 2025-07-29 RX ORDER — ATORVASTATIN CALCIUM 40 MG/1
40 TABLET, FILM COATED ORAL DAILY
Qty: 90 TABLET | Refills: 1 | Status: SHIPPED | OUTPATIENT
Start: 2025-07-29

## 2025-07-29 RX ORDER — DAPAGLIFLOZIN 10 MG/1
10 TABLET, FILM COATED ORAL DAILY
Qty: 90 TABLET | Refills: 1 | Status: SHIPPED | OUTPATIENT
Start: 2025-07-29

## 2025-07-29 RX ORDER — GLIMEPIRIDE 2 MG/1
2 TABLET ORAL
Qty: 180 TABLET | Refills: 1 | Status: SHIPPED | OUTPATIENT
Start: 2025-07-29

## 2025-07-29 RX ORDER — EZETIMIBE 10 MG/1
10 TABLET ORAL DAILY
Qty: 90 TABLET | Refills: 1 | Status: SHIPPED | OUTPATIENT
Start: 2025-07-29

## 2025-07-29 RX ORDER — LISINOPRIL 10 MG/1
10 TABLET ORAL DAILY
Qty: 90 TABLET | Refills: 1 | Status: SHIPPED | OUTPATIENT
Start: 2025-07-29

## 2025-07-29 NOTE — PATIENT INSTRUCTIONS
Medicare Wellness  Personal Prevention Plan of Service     Date of Office Visit:    Encounter Provider:  Boogie Interiano MD  Place of Service:  [facility]  Patient Name: Junior Pollard Jr.  :  1955    As part of the Medicare Wellness portion of your visit today, we are providing you with this personalized preventive plan of services (PPPS). This plan is based upon recommendations of the [place] Preventive Services Task Force (USPSTF) and the Advisory Committee on Immunization Practices (ACIP).    This lists the preventive care services that should be considered, and provides dates of when you are due. Items listed as completed are up-to-date and do not require any further intervention.    Health Maintenance   Topic Date Due    URINE MICROALBUMIN-CREATININE RATIO (uACR)  2025    DIABETIC EYE EXAM  2025    DIABETIC FOOT EXAM  2025 (Originally 1/15/2021)    Pneumococcal Vaccine 50+ (1 of 2 - PCV) 2025 (Originally 1972)    ZOSTER VACCINE (2 of 2) 2025 (Originally 2014)    COVID-19 Vaccine (2024- season) 2025 (Originally 2025)    INFLUENZA VACCINE  10/01/2025    PROSTATE CANCER SCREENING  2026    LIPID PANEL  2026    HEMOGLOBIN A1C  2026    ANNUAL WELLNESS VISIT  2026    TDAP/TD VACCINES (4 - Td or Tdap) 2028    HEPATITIS C SCREENING  Discontinued    COLORECTAL CANCER SCREENING  Discontinued       No orders of the defined types were placed in this encounter.      Return in about 6 months (around 2026) for Recheck.

## 2025-08-31 ENCOUNTER — E-VISIT (OUTPATIENT)
Dept: FAMILY MEDICINE CLINIC | Facility: TELEHEALTH | Age: 70
End: 2025-08-31
Payer: MEDICARE

## (undated) DEVICE — DEV SUT GRSPR CLOSUR 15CM 14G

## (undated) DEVICE — ENDOPATH PNEUMONEEDLE INSUFFLATION NEEDLES WITH LUER LOCK CONNECTORS 120MM: Brand: ENDOPATH

## (undated) DEVICE — EXTENSION SET, MALE LUER LOCK ADAPTER WITH RETRACTABLE COLLAR

## (undated) DEVICE — CATH URETRL SOF/FLEX OPN/END 4F 70CM

## (undated) DEVICE — TISSUE RETRIEVAL SYSTEM: Brand: INZII RETRIEVAL SYSTEM

## (undated) DEVICE — DRAPE,REIN 53X77,STERILE: Brand: MEDLINE

## (undated) DEVICE — LAPAROVUE VISIBILITY SYSTEM LAPAROSCOPIC SOLUTIONS: Brand: LAPAROVUE

## (undated) DEVICE — 1000ML,PRESSURE INFUSER W/STOPCOCK: Brand: MEDLINE

## (undated) DEVICE — APPL CHLORAPREP HI/LITE 26ML ORNG

## (undated) DEVICE — GOWN,NON-REINFORCED,SIRUS,SET IN SLV,XL: Brand: MEDLINE

## (undated) DEVICE — THE STERILE LIGHT HANDLE COVER IS USED WITH STERIS SURGICAL LIGHTING AND VISUALIZATION SYSTEMS.

## (undated) DEVICE — CATH IV INSYTE AUTOGARD 14G 1 1/2IN ORNG

## (undated) DEVICE — SOL NACL 0.9PCT 1000ML

## (undated) DEVICE — COLUMN DRAPE

## (undated) DEVICE — BLADELESS OBTURATOR: Brand: WECK VISTA

## (undated) DEVICE — ARM DRAPE

## (undated) DEVICE — ANTIBACTERIAL UNDYED BRAIDED (POLYGLACTIN 910), SYNTHETIC ABSORBABLE SURGICAL SUTURE: Brand: COATED VICRYL

## (undated) DEVICE — SUT MNCRYL PLS ANTIB UD 4/0 PS2 18IN

## (undated) DEVICE — SEAL

## (undated) DEVICE — ST TBG AIRSEAL BIF FLTR W/ACT/CHARCOAL/FLTR

## (undated) DEVICE — COVER,C-ARM,41X74: Brand: MEDLINE

## (undated) DEVICE — TROC BLADLES AIRSEAL/OPTI THRD 8X120MM 1P/U

## (undated) DEVICE — ADHS SKIN SURG TISS VISC PREMIERPRO EXOFIN HI/VISC FAST/DRY

## (undated) DEVICE — GLV SURG PREMIERPRO ORTHO LTX PF SZ7.5 BRN

## (undated) DEVICE — STPCK 3WY D201 DISCOFIX

## (undated) DEVICE — SUCTION IRRIGATOR: Brand: ENDOWRIST

## (undated) DEVICE — LOU LAP CHOLE: Brand: MEDLINE INDUSTRIES, INC.